# Patient Record
Sex: FEMALE | Race: BLACK OR AFRICAN AMERICAN | Employment: FULL TIME | URBAN - METROPOLITAN AREA
[De-identification: names, ages, dates, MRNs, and addresses within clinical notes are randomized per-mention and may not be internally consistent; named-entity substitution may affect disease eponyms.]

---
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
## 2021-02-10 ENCOUNTER — APPOINTMENT (OUTPATIENT)
Dept: CT IMAGING | Age: 21
DRG: 059 | End: 2021-02-10
Attending: EMERGENCY MEDICINE
Payer: COMMERCIAL

## 2021-02-10 ENCOUNTER — HOSPITAL ENCOUNTER (EMERGENCY)
Age: 21
Discharge: HOME | DRG: 059 | End: 2021-02-10
Attending: EMERGENCY MEDICINE
Payer: COMMERCIAL

## 2021-02-10 VITALS
RESPIRATION RATE: 18 BRPM | WEIGHT: 286.6 LBS | DIASTOLIC BLOOD PRESSURE: 98 MMHG | HEIGHT: 68 IN | OXYGEN SATURATION: 98 % | SYSTOLIC BLOOD PRESSURE: 159 MMHG | HEART RATE: 67 BPM | BODY MASS INDEX: 43.44 KG/M2 | TEMPERATURE: 98.4 F

## 2021-02-10 DIAGNOSIS — I10 HYPERTENSION, UNSPECIFIED TYPE: ICD-10-CM

## 2021-02-10 DIAGNOSIS — H54.61 VISION LOSS OF RIGHT EYE: Primary | ICD-10-CM

## 2021-02-10 LAB
ANION GAP BLD CALC-SCNC: 15 MMOL/L (ref 10–20)
BUN BLD-MCNC: 8 MG/DL (ref 9–20)
CA-I BLD-MCNC: 1.2 MMOL/L (ref 1.12–1.32)
CHLORIDE BLD-SCNC: 104 MMOL/L (ref 98–107)
CO2 BLD-SCNC: 25 MMOL/L (ref 21–32)
COMMENT, HOLDF: NORMAL
CREAT BLD-MCNC: 0.7 MG/DL (ref 0.6–1.3)
GLUCOSE BLD-MCNC: 86 MG/DL (ref 65–100)
HCG UR QL: NEGATIVE
HCT VFR BLD CALC: 40 % (ref 35–47)
POTASSIUM BLD-SCNC: 3.7 MMOL/L (ref 3.5–5.1)
SAMPLES BEING HELD,HOLD: NORMAL
SERVICE CMNT-IMP: ABNORMAL
SODIUM BLD-SCNC: 139 MMOL/L (ref 136–145)

## 2021-02-10 PROCEDURE — 96374 THER/PROPH/DIAG INJ IV PUSH: CPT

## 2021-02-10 PROCEDURE — 81025 URINE PREGNANCY TEST: CPT

## 2021-02-10 PROCEDURE — 80047 BASIC METABLC PNL IONIZED CA: CPT

## 2021-02-10 PROCEDURE — 99285 EMERGENCY DEPT VISIT HI MDM: CPT

## 2021-02-10 PROCEDURE — 74011000250 HC RX REV CODE- 250: Performed by: EMERGENCY MEDICINE

## 2021-02-10 PROCEDURE — 74011000636 HC RX REV CODE- 636: Performed by: RADIOLOGY

## 2021-02-10 PROCEDURE — 74011250636 HC RX REV CODE- 250/636: Performed by: EMERGENCY MEDICINE

## 2021-02-10 PROCEDURE — 70450 CT HEAD/BRAIN W/O DYE: CPT

## 2021-02-10 PROCEDURE — 70496 CT ANGIOGRAPHY HEAD: CPT

## 2021-02-10 RX ORDER — LABETALOL HCL 20 MG/4 ML
20 SYRINGE (ML) INTRAVENOUS
Status: COMPLETED | OUTPATIENT
Start: 2021-02-10 | End: 2021-02-10

## 2021-02-10 RX ORDER — TETRACAINE HYDROCHLORIDE 5 MG/ML
2 SOLUTION OPHTHALMIC
Status: COMPLETED | OUTPATIENT
Start: 2021-02-10 | End: 2021-02-10

## 2021-02-10 RX ORDER — AMLODIPINE BESYLATE 5 MG/1
5 TABLET ORAL DAILY
Qty: 30 TAB | Refills: 2 | Status: ON HOLD | OUTPATIENT
Start: 2021-02-10 | End: 2021-02-12

## 2021-02-10 RX ADMIN — TETRACAINE HYDROCHLORIDE 2 DROP: 5 SOLUTION OPHTHALMIC at 20:07

## 2021-02-10 RX ADMIN — LABETALOL HYDROCHLORIDE 20 MG: 5 INJECTION, SOLUTION INTRAVENOUS at 20:06

## 2021-02-10 RX ADMIN — IOPAMIDOL 160 ML: 755 INJECTION, SOLUTION INTRAVENOUS at 18:21

## 2021-02-10 NOTE — ED PROVIDER NOTES
Patient is a 25-year-old with a history of prediabetes and untreated hypertension who presents to the emergency [facility] with loss of vision in her right eye. Patient reports that approximately 5 days ago she developed a black spot in her central vision that has expanded. At this point she can see movement and light in her right eye but no colors or shapes; vision in her left eye is unchanged from its baseline. Patient has not had any headache, nausea, vomiting, dizziness, numbness, tingling, weakness, or eye pain or pressure. The history is provided by the patient. Eye Problem   This is a new problem. Episode onset: 6 days ago. The problem occurs constantly. The problem has not changed since onset. The right eye is affected. The injury mechanism was none. The patient is experiencing no pain. There is no history of trauma to the eye. There is no known exposure to pink eye. Associated symptoms include blindness (started as a central black spot that then expanded - now she can only see light and movement). Pertinent negatives include no numbness, no blurred vision, no photophobia, no nausea, no vomiting, no weakness, no fever, no pain, no head injury and no dizziness. She has tried nothing for the symptoms. No past medical history on file. No past surgical history on file.       Family History:   Problem Relation Age of Onset    Hypertension Mother     Hypertension Father     Elevated Lipids Father     Cancer Paternal Grandmother     Diabetes Maternal Grandmother        Social History     Socioeconomic History    Marital status: SINGLE     Spouse name: Not on file    Number of children: Not on file    Years of education: Not on file    Highest education level: Not on file   Occupational History    Not on file   Social Needs    Financial resource strain: Not on file    Food insecurity     Worry: Not on file     Inability: Not on file    Transportation needs     Medical: Not on file Non-medical: Not on file   Tobacco Use    Smoking status: Passive Smoke Exposure - Never Smoker    Smokeless tobacco: Never Used   Substance and Sexual Activity    Alcohol use: No    Drug use: No    Sexual activity: Never   Lifestyle    Physical activity     Days per week: Not on file     Minutes per session: Not on file    Stress: Not on file   Relationships    Social connections     Talks on phone: Not on file     Gets together: Not on file     Attends Mosque service: Not on file     Active member of club or organization: Not on file     Attends meetings of clubs or organizations: Not on file     Relationship status: Not on file    Intimate partner violence     Fear of current or ex partner: Not on file     Emotionally abused: Not on file     Physically abused: Not on file     Forced sexual activity: Not on file   Other Topics Concern    Not on file   Social History Narrative    Not on file         ALLERGIES: Patient has no known allergies. Review of Systems   Constitutional: Negative for chills and fever. Eyes: Positive for blindness (started as a central black spot that then expanded - now she can only see light and movement) and visual disturbance. Negative for blurred vision, photophobia and pain. Respiratory: Negative for shortness of breath. Cardiovascular: Negative for chest pain. Gastrointestinal: Negative for abdominal pain, constipation, diarrhea, nausea and vomiting. Neurological: Negative for dizziness, weakness, light-headedness and numbness. All other systems reviewed and are negative. Vitals:    02/10/21 1606   BP: (!) 182/100   Pulse: 80   Resp: 18   Temp: 97.1 °F (36.2 °C)   SpO2: 100%   Weight: 130 kg (286 lb 9.6 oz)   Height: 5' 8\" (1.727 m)            Physical Exam  Vitals signs and nursing note reviewed. Constitutional:       Appearance: Normal appearance. She is well-developed. She is obese. She is not ill-appearing.    HENT:      Head: Normocephalic and atraumatic.   Eyes:      General: No scleral icterus.     Intraocular pressure: Right eye pressure is 16 mmHg. Left eye pressure is 16 mmHg. Measurements were taken using an automated tonometer.  Neck:      Musculoskeletal: Normal range of motion.   Cardiovascular:      Rate and Rhythm: Normal rate and regular rhythm.   Pulmonary:      Effort: Pulmonary effort is normal.      Breath sounds: Normal breath sounds.   Abdominal:      General: There is no distension.      Tenderness: There is no abdominal tenderness.   Skin:     General: Skin is warm and dry.      Findings: No erythema or rash.   Neurological:      Mental Status: She is alert and oriented to person, place, and time.      Cranial Nerves: No cranial nerve deficit.      Sensory: No sensory deficit.      Motor: No weakness.      Gait: Gait normal.   Psychiatric:         Mood and Affect: Mood normal.         Behavior: Behavior normal.          MDM  Number of Diagnoses or Management Options  Hypertension, unspecified type  Vision loss of right eye  Diagnosis management comments: Patient presents with vision loss for 5 days and hypertension. The patient is now resting comfortably and feels better, is alert, talkative, interactive and in no distress.  The history, exam, diagnostic testing (if any) and the patient's current condition do not suggest meningitis, stroke, sepsis, subarachnoid hemorrhage, intracranial bleeding, encephalitis, temporal arteritis, glaucoma, or large mass. Education was provided to the patient today regarding their hypertension. Patient has not signs/symptoms of ACS, CHF, CVA, or other hypertensive emergency. Patient is made aware of their elevated blood pressure and is instructed to follow up this week with their Primary Care for a recheck; meanwhile I will prescribe amlodipine. Patient is counseled regarding consequences of chronic, uncontrolled hypertension including kidney disease, heart disease, stroke or even death. Patient states  their understanding and agrees to follow up this week. The patient's condition is stable and appropriate for discharge. The patient will pursue further outpatient evaluation with the primary care physician or other designated or consulting physician as indicated in the discharge instructions. Procedures    7:54 PM   Case was discussed with Dr. Richard Del Rosario, ophthalmology, who recommends checking ocular pressures. If pressures are wnl, she will see the patient in the morning at 8 AM for a dilated eye exam.          The patient's results have been reviewed with them and/or available family. Patient and/or family verbally conveyed their understanding and agreement of the patient's signs, symptoms, diagnosis, treatment and prognosis and additionally agree to follow up as recommended in the discharge instructions or to return to the Emergency Room should their condition change prior to their follow-up appointment. The patient/family verbally agrees with the care-plan and verbally conveys that all of their questions have been answered. The discharge instructions have also been provided to the patient and/or family with some educational information regarding the patient's diagnosis as well a list of reasons why the patient would want to return to the ER prior to their follow-up appointment, should their condition change.

## 2021-02-10 NOTE — Clinical Note
[address] 
[facility] EMERGENCY DEPT 
[address] [place] [id number] [phone] Work/School Note Date: 2/10/2021 To Whom It May concern: 
 
Liset Patrick was seen and treated today in the emergency room by the following provider(s): 
Attending Provider: Ricci Sweet MD. Liset Patrick is excused from work/school on 02/10/21 and 02/11/21. She is medically clear to return to work/school on 2/12/2021. Sincerely, Wendy Daniel MD

## 2021-02-10 NOTE — ED TRIAGE NOTES
Pt reports 5 days ago she started having changes in her vision in her right eye. Pt states at first it was only part of her visual field but now her entire visual field in her right eye appears black. Says she also has blurred vision in her left eye. Has appt with ophthalmologist in 2 days. Wears glasses. Denies any other sx.

## 2021-02-10 NOTE — ED NOTES

## 2021-02-11 ENCOUNTER — HOSPITAL ENCOUNTER (INPATIENT)
Age: 21
LOS: 3 days | Discharge: HOME | DRG: 059 | End: 2021-02-15
Attending: EMERGENCY MEDICINE | Admitting: FAMILY MEDICINE
Payer: COMMERCIAL

## 2021-02-11 ENCOUNTER — APPOINTMENT (OUTPATIENT)
Dept: MRI IMAGING | Age: 21
DRG: 059 | End: 2021-02-11
Attending: EMERGENCY MEDICINE
Payer: COMMERCIAL

## 2021-02-11 DIAGNOSIS — R73.03 PREDIABETES: ICD-10-CM

## 2021-02-11 DIAGNOSIS — G35 MULTIPLE SCLEROSIS (HCC): Primary | ICD-10-CM

## 2021-02-11 DIAGNOSIS — E66.01 SEVERE OBESITY (HCC): ICD-10-CM

## 2021-02-11 DIAGNOSIS — I10 ESSENTIAL HYPERTENSION: ICD-10-CM

## 2021-02-11 DIAGNOSIS — G37.9 DEMYELINATING DISEASE (HCC): ICD-10-CM

## 2021-02-11 DIAGNOSIS — H54.7 VISION LOSS: ICD-10-CM

## 2021-02-11 LAB
ALBUMIN SERPL-MCNC: 3.4 G/DL (ref 3.5–5)
ALBUMIN/GLOB SERPL: 0.8 {RATIO} (ref 1.1–2.2)
ALP SERPL-CCNC: 58 U/L (ref 45–117)
ALT SERPL-CCNC: 25 U/L (ref 12–78)
ANION GAP SERPL CALC-SCNC: 8 MMOL/L (ref 5–15)
AST SERPL-CCNC: 16 U/L (ref 15–37)
BASOPHILS # BLD: 0.1 K/UL (ref 0–0.1)
BASOPHILS NFR BLD: 1 % (ref 0–1)
BILIRUB SERPL-MCNC: 1.2 MG/DL (ref 0.2–1)
BUN SERPL-MCNC: 9 MG/DL (ref 6–20)
BUN/CREAT SERPL: 10 (ref 12–20)
CALCIUM SERPL-MCNC: 8.4 MG/DL (ref 8.5–10.1)
CHLORIDE SERPL-SCNC: 105 MMOL/L (ref 97–108)
CO2 SERPL-SCNC: 25 MMOL/L (ref 21–32)
COMMENT, HOLDF: NORMAL
CREAT SERPL-MCNC: 0.9 MG/DL (ref 0.55–1.02)
DIFFERENTIAL METHOD BLD: NORMAL
EOSINOPHIL # BLD: 0.2 K/UL (ref 0–0.4)
EOSINOPHIL NFR BLD: 3 % (ref 0–7)
ERYTHROCYTE [DISTWIDTH] IN BLOOD BY AUTOMATED COUNT: 13.2 % (ref 11.5–14.5)
GLOBULIN SER CALC-MCNC: 4.1 G/DL (ref 2–4)
GLUCOSE SERPL-MCNC: 90 MG/DL (ref 65–100)
HCT VFR BLD AUTO: 36.5 % (ref 35–47)
HGB BLD-MCNC: 12.1 G/DL (ref 11.5–16)
IMM GRANULOCYTES # BLD AUTO: 0 K/UL (ref 0–0.04)
IMM GRANULOCYTES NFR BLD AUTO: 0 % (ref 0–0.5)
LYMPHOCYTES # BLD: 1.9 K/UL (ref 0.8–3.5)
LYMPHOCYTES NFR BLD: 28 % (ref 12–49)
MCH RBC QN AUTO: 27.9 PG (ref 26–34)
MCHC RBC AUTO-ENTMCNC: 33.2 G/DL (ref 30–36.5)
MCV RBC AUTO: 84.1 FL (ref 80–99)
MONOCYTES # BLD: 0.6 K/UL (ref 0–1)
MONOCYTES NFR BLD: 9 % (ref 5–13)
NEUTS SEG # BLD: 4 K/UL (ref 1.8–8)
NEUTS SEG NFR BLD: 59 % (ref 32–75)
NRBC # BLD: 0 K/UL (ref 0–0.01)
NRBC BLD-RTO: 0 PER 100 WBC
PLATELET # BLD AUTO: 313 K/UL (ref 150–400)
PMV BLD AUTO: 9.9 FL (ref 8.9–12.9)
POTASSIUM SERPL-SCNC: 3.4 MMOL/L (ref 3.5–5.1)
PROT SERPL-MCNC: 7.5 G/DL (ref 6.4–8.2)
RBC # BLD AUTO: 4.34 M/UL (ref 3.8–5.2)
SAMPLES BEING HELD,HOLD: NORMAL
SODIUM SERPL-SCNC: 138 MMOL/L (ref 136–145)
WBC # BLD AUTO: 6.7 K/UL (ref 3.6–11)

## 2021-02-11 PROCEDURE — A9575 INJ GADOTERATE MEGLUMI 0.1ML: HCPCS | Performed by: RADIOLOGY

## 2021-02-11 PROCEDURE — 70553 MRI BRAIN STEM W/O & W/DYE: CPT

## 2021-02-11 PROCEDURE — 74011250636 HC RX REV CODE- 250/636: Performed by: RADIOLOGY

## 2021-02-11 PROCEDURE — 74011000258 HC RX REV CODE- 258: Performed by: EMERGENCY MEDICINE

## 2021-02-11 PROCEDURE — 99284 EMERGENCY DEPT VISIT MOD MDM: CPT

## 2021-02-11 PROCEDURE — 85025 COMPLETE CBC W/AUTO DIFF WBC: CPT

## 2021-02-11 PROCEDURE — 80053 COMPREHEN METABOLIC PANEL: CPT

## 2021-02-11 PROCEDURE — 96374 THER/PROPH/DIAG INJ IV PUSH: CPT

## 2021-02-11 PROCEDURE — 74011250636 HC RX REV CODE- 250/636: Performed by: EMERGENCY MEDICINE

## 2021-02-11 PROCEDURE — 36415 COLL VENOUS BLD VENIPUNCTURE: CPT

## 2021-02-11 RX ORDER — GADOTERATE MEGLUMINE 376.9 MG/ML
20 INJECTION INTRAVENOUS
Status: COMPLETED | OUTPATIENT
Start: 2021-02-11 | End: 2021-02-11

## 2021-02-11 RX ADMIN — GADOTERATE MEGLUMINE 20 ML: 376.9 INJECTION INTRAVENOUS at 19:58

## 2021-02-11 RX ADMIN — SODIUM CHLORIDE 1000 MG: 900 INJECTION INTRAVENOUS at 23:14

## 2021-02-11 NOTE — ED TRIAGE NOTES
Pt here for loss of vision in right eye since Friday. Seen here last night and referred to eye dr. Dena Medina there today and sent back today for MRI. Pt states started with small black area and now can't see other than differentiating between light and dark.

## 2021-02-11 NOTE — ED NOTES
Dr. Po Alvarado has reviewed discharge instructions with the patient. The patient verbalized understanding.

## 2021-02-11 NOTE — ED NOTES
Bedside and Verbal shift change report given to Ashlee Kauffman RN (oncoming nurse) by ROLY Marques (offgoing nurse). Report included the following information SBAR, Kardex and ED Summary.

## 2021-02-12 ENCOUNTER — APPOINTMENT (OUTPATIENT)
Dept: MRI IMAGING | Age: 21
DRG: 059 | End: 2021-02-12
Attending: STUDENT IN AN ORGANIZED HEALTH CARE EDUCATION/TRAINING PROGRAM
Payer: COMMERCIAL

## 2021-02-12 PROBLEM — I10 HTN (HYPERTENSION): Status: ACTIVE | Noted: 2021-02-12

## 2021-02-12 PROBLEM — H54.7 VISION LOSS: Status: ACTIVE | Noted: 2021-02-12

## 2021-02-12 PROBLEM — G37.9 DEMYELINATING DISEASE (HCC): Status: ACTIVE | Noted: 2021-02-12

## 2021-02-12 LAB
ANION GAP SERPL CALC-SCNC: 5 MMOL/L (ref 5–15)
BASOPHILS # BLD: 0 K/UL (ref 0–0.1)
BASOPHILS NFR BLD: 0 % (ref 0–1)
BUN SERPL-MCNC: 10 MG/DL (ref 6–20)
BUN/CREAT SERPL: 11 (ref 12–20)
CALCIUM SERPL-MCNC: 8.6 MG/DL (ref 8.5–10.1)
CHLORIDE SERPL-SCNC: 104 MMOL/L (ref 97–108)
CO2 SERPL-SCNC: 26 MMOL/L (ref 21–32)
CREAT SERPL-MCNC: 0.87 MG/DL (ref 0.55–1.02)
DIFFERENTIAL METHOD BLD: ABNORMAL
EOSINOPHIL # BLD: 0 K/UL (ref 0–0.4)
EOSINOPHIL NFR BLD: 1 % (ref 0–7)
ERYTHROCYTE [DISTWIDTH] IN BLOOD BY AUTOMATED COUNT: 13 % (ref 11.5–14.5)
EST. AVERAGE GLUCOSE BLD GHB EST-MCNC: 105 MG/DL
GLUCOSE SERPL-MCNC: 112 MG/DL (ref 65–100)
HBA1C MFR BLD: 5.3 % (ref 4–5.6)
HCT VFR BLD AUTO: 38 % (ref 35–47)
HGB BLD-MCNC: 12.6 G/DL (ref 11.5–16)
IMM GRANULOCYTES # BLD AUTO: 0 K/UL (ref 0–0.04)
IMM GRANULOCYTES NFR BLD AUTO: 0 % (ref 0–0.5)
LYMPHOCYTES # BLD: 0.8 K/UL (ref 0.8–3.5)
LYMPHOCYTES NFR BLD: 12 % (ref 12–49)
MCH RBC QN AUTO: 27.8 PG (ref 26–34)
MCHC RBC AUTO-ENTMCNC: 33.2 G/DL (ref 30–36.5)
MCV RBC AUTO: 83.9 FL (ref 80–99)
MONOCYTES # BLD: 0.2 K/UL (ref 0–1)
MONOCYTES NFR BLD: 2 % (ref 5–13)
NEUTS SEG # BLD: 6 K/UL (ref 1.8–8)
NEUTS SEG NFR BLD: 85 % (ref 32–75)
NRBC # BLD: 0 K/UL (ref 0–0.01)
NRBC BLD-RTO: 0 PER 100 WBC
PLATELET # BLD AUTO: 306 K/UL (ref 150–400)
PMV BLD AUTO: 9.8 FL (ref 8.9–12.9)
POTASSIUM SERPL-SCNC: 3.8 MMOL/L (ref 3.5–5.1)
RBC # BLD AUTO: 4.53 M/UL (ref 3.8–5.2)
SODIUM SERPL-SCNC: 135 MMOL/L (ref 136–145)
WBC # BLD AUTO: 7 K/UL (ref 3.6–11)

## 2021-02-12 PROCEDURE — 74011250636 HC RX REV CODE- 250/636: Performed by: STUDENT IN AN ORGANIZED HEALTH CARE EDUCATION/TRAINING PROGRAM

## 2021-02-12 PROCEDURE — 99223 1ST HOSP IP/OBS HIGH 75: CPT | Performed by: FAMILY MEDICINE

## 2021-02-12 PROCEDURE — A9575 INJ GADOTERATE MEGLUMI 0.1ML: HCPCS | Performed by: FAMILY MEDICINE

## 2021-02-12 PROCEDURE — 94760 N-INVAS EAR/PLS OXIMETRY 1: CPT

## 2021-02-12 PROCEDURE — 97161 PT EVAL LOW COMPLEX 20 MIN: CPT

## 2021-02-12 PROCEDURE — 99255 IP/OBS CONSLTJ NEW/EST HI 80: CPT | Performed by: PSYCHIATRY & NEUROLOGY

## 2021-02-12 PROCEDURE — 74011250636 HC RX REV CODE- 250/636: Performed by: FAMILY MEDICINE

## 2021-02-12 PROCEDURE — 83036 HEMOGLOBIN GLYCOSYLATED A1C: CPT

## 2021-02-12 PROCEDURE — 80048 BASIC METABOLIC PNL TOTAL CA: CPT

## 2021-02-12 PROCEDURE — 72157 MRI CHEST SPINE W/O & W/DYE: CPT

## 2021-02-12 PROCEDURE — 72156 MRI NECK SPINE W/O & W/DYE: CPT

## 2021-02-12 PROCEDURE — 77030036660

## 2021-02-12 PROCEDURE — 36415 COLL VENOUS BLD VENIPUNCTURE: CPT

## 2021-02-12 PROCEDURE — 74011250637 HC RX REV CODE- 250/637: Performed by: STUDENT IN AN ORGANIZED HEALTH CARE EDUCATION/TRAINING PROGRAM

## 2021-02-12 PROCEDURE — 85025 COMPLETE CBC W/AUTO DIFF WBC: CPT

## 2021-02-12 PROCEDURE — 74011000258 HC RX REV CODE- 258: Performed by: PSYCHIATRY & NEUROLOGY

## 2021-02-12 PROCEDURE — 74011250636 HC RX REV CODE- 250/636: Performed by: PSYCHIATRY & NEUROLOGY

## 2021-02-12 PROCEDURE — 97165 OT EVAL LOW COMPLEX 30 MIN: CPT

## 2021-02-12 PROCEDURE — 65270000029 HC RM PRIVATE

## 2021-02-12 RX ORDER — LABETALOL HCL 20 MG/4 ML
20 SYRINGE (ML) INTRAVENOUS
Status: DISCONTINUED | OUTPATIENT
Start: 2021-02-12 | End: 2021-02-15 | Stop reason: HOSPADM

## 2021-02-12 RX ORDER — ENOXAPARIN SODIUM 100 MG/ML
40 INJECTION SUBCUTANEOUS DAILY
Status: DISCONTINUED | OUTPATIENT
Start: 2021-02-12 | End: 2021-02-12

## 2021-02-12 RX ORDER — AMLODIPINE BESYLATE 5 MG/1
5 TABLET ORAL DAILY
COMMUNITY
End: 2021-02-15

## 2021-02-12 RX ORDER — ACETAMINOPHEN 325 MG/1
650 TABLET ORAL
Status: DISCONTINUED | OUTPATIENT
Start: 2021-02-12 | End: 2021-02-15 | Stop reason: HOSPADM

## 2021-02-12 RX ORDER — AMLODIPINE BESYLATE 5 MG/1
5 TABLET ORAL
Status: DISCONTINUED | OUTPATIENT
Start: 2021-02-12 | End: 2021-02-12

## 2021-02-12 RX ORDER — ACETAMINOPHEN 650 MG/1
650 SUPPOSITORY RECTAL
Status: DISCONTINUED | OUTPATIENT
Start: 2021-02-12 | End: 2021-02-15 | Stop reason: HOSPADM

## 2021-02-12 RX ORDER — POLYETHYLENE GLYCOL 3350 17 G/17G
17 POWDER, FOR SOLUTION ORAL DAILY PRN
Status: DISCONTINUED | OUTPATIENT
Start: 2021-02-12 | End: 2021-02-15 | Stop reason: HOSPADM

## 2021-02-12 RX ORDER — ONDANSETRON 2 MG/ML
4 INJECTION INTRAMUSCULAR; INTRAVENOUS
Status: DISCONTINUED | OUTPATIENT
Start: 2021-02-12 | End: 2021-02-15 | Stop reason: HOSPADM

## 2021-02-12 RX ORDER — GADOTERATE MEGLUMINE 376.9 MG/ML
20 INJECTION INTRAVENOUS
Status: COMPLETED | OUTPATIENT
Start: 2021-02-12 | End: 2021-02-12

## 2021-02-12 RX ORDER — AMLODIPINE BESYLATE 5 MG/1
5 TABLET ORAL
Status: COMPLETED | OUTPATIENT
Start: 2021-02-12 | End: 2021-02-12

## 2021-02-12 RX ORDER — SODIUM CHLORIDE 0.9 % (FLUSH) 0.9 %
5-40 SYRINGE (ML) INJECTION EVERY 8 HOURS
Status: DISCONTINUED | OUTPATIENT
Start: 2021-02-12 | End: 2021-02-15 | Stop reason: HOSPADM

## 2021-02-12 RX ORDER — PROMETHAZINE HYDROCHLORIDE 25 MG/1
12.5 TABLET ORAL
Status: DISCONTINUED | OUTPATIENT
Start: 2021-02-12 | End: 2021-02-15 | Stop reason: HOSPADM

## 2021-02-12 RX ORDER — SODIUM CHLORIDE 0.9 % (FLUSH) 0.9 %
5-40 SYRINGE (ML) INJECTION AS NEEDED
Status: DISCONTINUED | OUTPATIENT
Start: 2021-02-12 | End: 2021-02-15 | Stop reason: HOSPADM

## 2021-02-12 RX ORDER — HYDRALAZINE HYDROCHLORIDE 20 MG/ML
10 INJECTION INTRAMUSCULAR; INTRAVENOUS
Status: DISCONTINUED | OUTPATIENT
Start: 2021-02-12 | End: 2021-02-12

## 2021-02-12 RX ORDER — AMLODIPINE BESYLATE 5 MG/1
10 TABLET ORAL DAILY
Status: DISCONTINUED | OUTPATIENT
Start: 2021-02-13 | End: 2021-02-15 | Stop reason: HOSPADM

## 2021-02-12 RX ORDER — ENOXAPARIN SODIUM 100 MG/ML
40 INJECTION SUBCUTANEOUS EVERY 12 HOURS
Status: DISCONTINUED | OUTPATIENT
Start: 2021-02-12 | End: 2021-02-15 | Stop reason: HOSPADM

## 2021-02-12 RX ADMIN — AMLODIPINE BESYLATE 5 MG: 5 TABLET ORAL at 01:32

## 2021-02-12 RX ADMIN — LABETALOL HYDROCHLORIDE 20 MG: 5 INJECTION, SOLUTION INTRAVENOUS at 15:19

## 2021-02-12 RX ADMIN — Medication 10 ML: at 15:22

## 2021-02-12 RX ADMIN — Medication 10 ML: at 20:07

## 2021-02-12 RX ADMIN — ACETAMINOPHEN 650 MG: 325 TABLET ORAL at 20:10

## 2021-02-12 RX ADMIN — SODIUM CHLORIDE 1000 MG: 900 INJECTION INTRAVENOUS at 18:00

## 2021-02-12 RX ADMIN — HYDRALAZINE HYDROCHLORIDE 10 MG: 20 INJECTION, SOLUTION INTRAMUSCULAR; INTRAVENOUS at 04:32

## 2021-02-12 RX ADMIN — GADOTERATE MEGLUMINE 20 ML: 376.9 INJECTION INTRAVENOUS at 13:39

## 2021-02-12 RX ADMIN — ACETAMINOPHEN 650 MG: 325 TABLET ORAL at 15:00

## 2021-02-12 RX ADMIN — ENOXAPARIN SODIUM 40 MG: 100 INJECTION SUBCUTANEOUS at 10:38

## 2021-02-12 RX ADMIN — Medication 10 ML: at 05:13

## 2021-02-12 RX ADMIN — ENOXAPARIN SODIUM 40 MG: 100 INJECTION SUBCUTANEOUS at 20:06

## 2021-02-12 RX ADMIN — AMLODIPINE BESYLATE 5 MG: 5 TABLET ORAL at 10:38

## 2021-02-12 NOTE — ED NOTES
TRANSFER - OUT REPORT:    Verbal report given to [facility] RN(name) on Claude Call  being transferred to 4th floor(unit) for routine progression of care       Report consisted of patients Situation, Background, Assessment and   Recommendations(SBAR). Information from the following report(s) SBAR, ED Summary, [facility] and Recent Results was reviewed with the receiving nurse. Lines:   Peripheral IV 02/11/21 Right Hand (Active)   Phlebitis Assessment 0 02/11/21 1841   Infiltration Assessment 0 02/11/21 1841   Dressing Status Clean, dry, & intact 02/11/21 1841        Opportunity for questions and clarification was provided.       Patient transported with:   Monitor  Registered Nurse

## 2021-02-12 NOTE — PROGRESS NOTES
OCCUPATIONAL THERAPY EVALUATION/DISCHARGE  Patient: Erika Pina (87 y.o. female)  Date: 2/12/2021  Primary Diagnosis: Vision loss [H54.7]       Precautions: Vision (blind in R eye - new)      ASSESSMENT  Based on the objective data described below, the patient presents with impaired vision in R eye following admission for vision loss. Pt is received in bed, pleasant and agreeable to participate, able to follow all commands. Pt is able to perform serial ADL tasks and activity with independence and demonstrates equal and intact ROM, coordination, and strength in UEs. Pt has absent vision in R eye. She reports seeing \"white lines\" and light, but no shadows or shapes. Vision intact on L. No observed difficulty with obstacle mgmt during OOB activity, but pt is receptive to education regarding visual scanning and lighthouse technique during mobility for fall prevention. At this time, no further skilled acute OT needs indicated. Neurology MD present at end of session. Current Level of Function (ADLs/self-care): mod I to independent for ADLs    Functional Outcome Measure: The patient scored 100/100 on the Barthel functional Outcome Measure, which indicates no functional impairment. Other factors to consider for discharge: new diagnosis possible MS; age; active and high PLOF     PLAN :  Recommendation for discharge: (in order for the patient to meet his/her long term goals)  No skilled occupational therapy/ follow up rehabilitation needs identified at this time. This discharge recommendation:  Has been made in collaboration with the attending provider and/or case management    IF patient discharges home will need the following DME:none       SUBJECTIVE:   Patient stated I can't see on the right side.     OBJECTIVE DATA SUMMARY:   HISTORY:   History reviewed. No pertinent past medical history. No past surgical history on file.     Prior Level of Function/Environment/Context: works for [employer], active and independent; lives alone in an apartment  Expanded or extensive additional review of patient history:     Home Situation  Home Environment: Apartment  # Steps to Enter: 36  Rails to Enter: Yes  Hand Rails : Right  Living Alone: Yes  Support Systems: Family member(s)  Tub or Shower Type: Tub/Shower combination    Hand dominance: Right    EXAMINATION OF PERFORMANCE DEFICITS:  Cognitive/Behavioral Status:  Neurologic State: Alert  Orientation Level: Oriented X4  Cognition: Appropriate decision making;Appropriate for age attention/concentration;Appropriate safety awareness;Follows commands(Simultaneous filing. User may not have seen previous data.)  Perception: Appears intact  Perseveration: No perseveration noted  Safety/Judgement: Awareness of environment;Fall prevention;Good awareness of safety precautions;Home safety;Insight into deficits    Vision/Perceptual:    Tracking: Able to track stimulus in all quadrants w/o difficulty    Convergence: Breaks at 9 from nose    Visual Fields: Difficulty detecting stimulus  in right lateral quadrant;Difficulty detecting stimulus in right lower quadrant;Difficulty detecting stimulus in right upper quadrant  Diplopia: No    When L eye is occluded, pt unable to locate a target or track a stimulus with R eye.     Range of Motion:  AROM: Within functional limits  PROM: Within functional limits    Strength:  Strength: Within functional limits    Coordination:  Coordination: Within functional limits  Fine Motor Skills-Upper: Left Intact;Right Intact    Gross Motor Skills-Upper: Left Intact;Right Intact    Tone & Sensation:  Tone: Normal  Sensation: Intact    Balance:  Sitting: Intact  Standing: Intact    Functional Mobility and Transfers for ADLs:  Bed Mobility:  Supine to Sit: Independent  Sit to Supine: Independent    Transfers:  Sit to Stand: Independent  Stand to Sit: Independent  Bathroom Mobility: Independent  Toilet Transfer : Independent    ADL Assessment:  Feeding:  Independent    Oral Facial Hygiene/Grooming: Independent    Bathing: Supervision    Upper Body Dressing: Independent    Lower Body Dressing: Independent    Toileting: Independent    ADL Intervention and task modifications:    Lower Body Dressing Assistance  Socks: Independent  Slip on Shoes with Back: Independent  Leg Crossed Method Used: Yes  Position Performed: Seated edge of bed  Cues: Don    Cognitive Retraining  Safety/Judgement: Awareness of environment; Fall prevention;Good awareness of safety precautions; Home safety; Insight into deficits    Pt educated on visual scanning and use of lighthouse technique during OOB mobility for fall prevention due to impaired vision on R side. Pt indicated understanding of same. Functional Measure:  Barthel Index:    Bathin  Bladder: 10  Bowels: 10  Groomin  Dressing: 10  Feeding: 10  Mobility: 15  Stairs: 10  Toilet Use: 10  Transfer (Bed to Chair and Back): 15  Total: 100/100        The Barthel ADL Index: Guidelines  1. The index should be used as a record of what a patient does, not as a record of what a patient could do. 2. The main aim is to establish degree of independence from any help, physical or verbal, however minor and for whatever reason. 3. The need for supervision renders the patient not independent. 4. A patient's performance should be established using the best available evidence. Asking the patient, friends/relatives and nurses are the usual sources, but direct observation and common sense are also important. However direct testing is not needed. 5. Usually the patient's performance over the preceding 24-48 hours is important, but occasionally longer periods will be relevant. 6. Middle categories imply that the patient supplies over 50 per cent of the effort. 7. Use of aids to be independent is allowed. Cam Warren., Barthel, D.W. ([id number]). Functional evaluation: the Barthel Index. [address] [facility] (14)2.   HARRY Augustine, Elizabeth, DANNY Nobles (1999). Measuring the change indisability after inpatient rehabilitation; comparison of the responsiveness of the Barthel Index and Functional [facility] Measure. Journal of Neurology, Neurosurgery, and Psychiatry, 66(4), 369-823. JIMENA Elliott, GLORIA Post, & Leopoldo Soliman M.A. (2004.) Assessment of post-stroke quality of life in cost-effectiveness studies: The usefulness of the Barthel Index and the EuroQoL-5D. Quality of Life Research, 15, 149-15     Occupational Therapy Evaluation Charge Determination   History Examination Decision-Making   LOW Complexity : Brief history review  LOW Complexity : 1-3 performance deficits relating to physical, cognitive , or psychosocial skils that result in activity limitations and / or participation restrictions  LOW Complexity : No comorbidities that affect functional and no verbal or physical assistance needed to complete eval tasks       Based on the above components, the patient evaluation is determined to be of the following complexity level: LOW   Pain Rating:  Pt reporting 4/10 frontal HA during session    Activity Tolerance:   Good    After treatment patient left in no apparent distress:    Sitting up at EOB with Neurologist present; Mother present at bedside    COMMUNICATION/EDUCATION:   The patients plan of care was discussed with: Physical therapist and Registered nurse.      Thank you for this referral.  Jama Lugo OT  Time Calculation: 17 mins

## 2021-02-12 NOTE — H&P
[facility] [address] [place] [address]   Office [phone]  Fax [phone]       Admission H&P     Name: Catie Hamm MRN: [id number]  Sex: Female   YOB: 2000  Age: 24 y.o. PCP: Luis Galan MD     Source of Information: patient, medical records    Chief complaint: R eye vision loss    History of Present Illness  Catie Hamm is a 24 y.o. female with PMHx of HTN, prediabetes who presents to the ED complaining of vision loss in R eye. Symptoms first started 1 week ago, initially had a dark area in the middle of her eye that affected her vision. This area of vision loss expanded progressively over the course of the last week, causing her to present to the ED 2 days ago. At that time pt was set up to see an ophthalmologist OP, and at her visit today was instructed to return to the hospital to obtain an MRI of her brain. Pt states that she is now unable to clearly see out of the entire R eye and can just see raoul and light shapes. Pt endorses experiencing moments of LE extremity weakness over the course of the last year, she has not had any falls or difficulty walking but states that she feels like her legs will give out. Additionally pt endorses periodic loss of control of her bladder for at least the last year. Pt states that she is sometimes unable to make it to the bathroom in time and has an accident. She also sometimes has episodes of enuresis, most recently 1 week ago. COVID Questions:   Experiencing any of the following symptoms: fever, chills, cough, SOB, diarrhea, URI symptoms. No  Any Sick contacts with fever, cough, diarrhea, SOB, URI symptoms. No  Traveled out of state or out of country. No  Lives alone. Has been staying at home.  Yes    In the ED:  Vitals: Temp 97.1   /97   HR 87   RR 15   SatO2  99% on ra  Labs: CBC wnl, CMP wnl  Imaging: MRI brain w/ multiple parenchymal signal abnormalities suspicious for demyelinating  Disease - no active demyelination. Treatment: methyl-prednisolone 1g IV      Patient Vitals for the past 12 hrs:   Temp Pulse Resp BP SpO2   02/11/21 2230    (!) 176/93    02/11/21 2130    (!) 163/108 100 %   02/11/21 2126     99 %   02/11/21 2125    (!) 177/98 99 %   02/11/21 1814 97.1 °F (36.2 °C) 87 15 (!) 183/97 99 %       Review of Systems  Review of Systems   Constitutional: Negative for activity change, chills and fever. HENT: Negative for congestion, rhinorrhea, sinus pain and sore throat. Eyes: Positive for visual disturbance. Negative for pain and discharge. Respiratory: Negative for cough, chest tightness, shortness of breath and wheezing. Cardiovascular: Negative for chest pain, palpitations and leg swelling. Gastrointestinal: Negative for abdominal pain, constipation, diarrhea, nausea and vomiting. Genitourinary: Negative for dysuria, flank pain, frequency and hematuria. Musculoskeletal: Negative for arthralgias, gait problem and myalgias. Neurological: Negative for dizziness, syncope, speech difficulty, weakness, light-headedness and headaches. Psychiatric/Behavioral: Negative for agitation, behavioral problems, confusion and decreased concentration. Home Medications   Prior to Admission medications    Medication Sig Start Date End Date Taking? Authorizing Provider   amLODIPine (NORVASC) 5 mg tablet Take 1 Tab by mouth daily. 2/10/21   Ricci Sweet MD   ferrous sulfate (IRON) 325 mg (65 mg iron) EC tablet Take 1 Tab by mouth daily. 2/10/20   Elen Dixon NP   ergocalciferol (ERGOCALCIFEROL) 1,250 mcg (50,000 unit) capsule Take 1 Cap by mouth every seven (7) days. 2/10/20   Elen Dixon NP   norethindrone-e estradiol-iron (LOESTRIN FE) 1 mg-20 mcg (24)/75 mg (4) tab Take 1 Tab by mouth daily. 2/7/20   Elen Dixon NP       Allergies  No Known Allergies    Past Medical History  History reviewed. No pertinent past medical history.     Previous Hospitalization(s)  No past surgical history on file. Family History  Family History   Problem Relation Age of Onset    Hypertension Mother     Hypertension Father     Elevated Lipids Father     Cancer Paternal Grandmother     Diabetes Maternal Grandmother        Social History  Alcohol history: Not at all  Smoking history: Smokes 1 Black and Mild per day x 5 years  Illicit drug history: Not at all  Living arrangement: patient lives alone. Ambulates: Independently     Vital Signs  Visit Vitals  BP (!) 176/93   Pulse 87   Temp 97.1 °F (36.2 °C)   Resp 15   Ht 5' 8\" (1.727 m)   Wt 285 lb (129.3 kg)   SpO2 100%   BMI 43.33 kg/m²       Physical Exam  General: No acute distress. Alert. Cooperative. Head: Normocephalic. Atraumatic. Eyes:              Conjunctiva pink. Sclera white. PERRL. EOMI, no pain w/ ocular movements. Ears:              Hearing grossly intact. Nose:             Septum midline. Mucosa pink. No drainage. Throat: Mucosa pink. Moist mucous membranes. No tonsillar exudates or erythema. Palate movement equal bilaterally. Neck: Supple. Normal ROM. No stiffness. Respiratory: CTAB. No w/r/r/c.   Cardiovascular: RRR. Normal S1,S2. No m/r/g. Pulses 2+ throughout. GI: + bowel sounds. Nontender. No rebound tenderness or guarding. Nondistended. Extremities: Absent LE edema. Distal pulses intact. Musculoskeletal: Full ROM in all extremities. Skin: Warm, dry. No rashes. Neuro: CN III-XII intact, impaired vision to R eye. Unable to appreciate objects, count fingers. Strength 5/5 in all extremities. Sensation intact throughout. Laboratory Data  Recent Results (from the past 8 hour(s))   SAMPLES BEING HELD    Collection Time: 02/11/21  6:36 PM   Result Value Ref Range    SAMPLES BEING HELD [email or web address]     COMMENT        Add-on orders for these samples will be processed based on acceptable specimen integrity and analyte stability, which may vary by analyte.    CBC WITH AUTOMATED DIFF    Collection Time: 02/11/21  6:36 PM   Result Value Ref Range    WBC 6.7 3.6 - 11.0 K/uL    RBC 4.34 3.80 - 5.20 M/uL    HGB 12.1 11.5 - 16.0 g/dL    HCT 36.5 35.0 - 47.0 %    MCV 84.1 80.0 - 99.0 FL    MCH 27.9 26.0 - 34.0 PG    MCHC 33.2 30.0 - 36.5 g/dL    RDW 13.2 11.5 - 14.5 %    PLATELET 758 383 - 041 K/uL    MPV 9.9 8.9 - 12.9 FL    NRBC 0.0 0  WBC    ABSOLUTE NRBC 0.00 0.00 - 0.01 K/uL    NEUTROPHILS 59 32 - 75 %    LYMPHOCYTES 28 12 - 49 %    MONOCYTES 9 5 - 13 %    EOSINOPHILS 3 0 - 7 %    BASOPHILS 1 0 - 1 %    IMMATURE GRANULOCYTES 0 0.0 - 0.5 %    ABS. NEUTROPHILS 4.0 1.8 - 8.0 K/UL    ABS. LYMPHOCYTES 1.9 0.8 - 3.5 K/UL    ABS. MONOCYTES 0.6 0.0 - 1.0 K/UL    ABS. EOSINOPHILS 0.2 0.0 - 0.4 K/UL    ABS. BASOPHILS 0.1 0.0 - 0.1 K/UL    ABS. IMM. GRANS. 0.0 0.00 - 0.04 K/UL    DF AUTOMATED     METABOLIC PANEL, COMPREHENSIVE    Collection Time: 02/11/21  6:36 PM   Result Value Ref Range    Sodium 138 136 - 145 mmol/L    Potassium 3.4 (L) 3.5 - 5.1 mmol/L    Chloride 105 97 - 108 mmol/L    CO2 25 21 - 32 mmol/L    Anion gap 8 5 - 15 mmol/L    Glucose 90 65 - 100 mg/dL    BUN 9 6 - 20 MG/DL    Creatinine 0.90 0.55 - 1.02 MG/DL    BUN/Creatinine ratio 10 (L) 12 - 20      GFR est AA >60 >60 ml/min/1.73m2    GFR est non-AA >60 >60 ml/min/1.73m2    Calcium 8.4 (L) 8.5 - 10.1 MG/DL    Bilirubin, total 1.2 (H) 0.2 - 1.0 MG/DL    ALT (SGPT) 25 12 - 78 U/L    AST (SGOT) 16 15 - 37 U/L    Alk. phosphatase 58 45 - 117 U/L    Protein, total 7.5 6.4 - 8.2 g/dL    Albumin 3.4 (L) 3.5 - 5.0 g/dL    Globulin 4.1 (H) 2.0 - 4.0 g/dL    A-G Ratio 0.8 (L) 1.1 - 2.2         Imaging  CXR Results  (Last 48 hours)    None        CT Results  (Last 48 hours)               02/10/21 1827  CT HEAD WO CONT Final result    Impression:  No acute intracranial process seen       Narrative:  EXAM: Head CT       INDICATION: right sided visual loss       COMPARISON: None. TECHNIQUE: Unenhanced CT of the head was performed using 5 mm images. Brain and   bone windows were generated. CT dose reduction was achieved through use of a   standardized protocol tailored for this examination and automatic exposure   control for dose modulation. FINDINGS:   The ventricles and sulci are normal in size, shape and configuration and   midline. There is no significant white matter disease. There is no intracranial   hemorrhage, extra-axial collection, mass, mass effect or midline shift. The   basilar cisterns are open. No acute infarct is identified. The bone windows   demonstrate no abnormalities. The left maxillary sinus has a 2.5 cm mucous   retention cyst.           02/10/21 1827  CTA HEAD NECK W CONT Final result    Impression:  No evidence of large vessel occlusion or hemodynamically significant carotid   stenosis. Narrative:  *PRELIMINARY REPORT*       No large vessel occlusion. Preliminary report was provided by Dr. Beti Paige, the on-call radiologist, at [address]       Final report to follow. *END PRELIMINARY REPORT*       CLINICAL HISTORY: Right-sided visual loss       EXAMINATION:  CT ANGIOGRAPHY HEAD AND NECK       COMPARISON: None       TECHNIQUE:  Following the uneventful administration of iodinated contrast   material, axial CT angiography of the head and neck was performed. Coronal and   sagittal reconstructions were obtained. Manual postprocessing of images was   performed. 3-D  Sagittal maximal intensity projection images were obtained. 3-D   Coronal maximal intensity projections were obtained. CT dose reduction was   achieved through use of a standardized protocol tailored for this examination   and automatic exposure control for dose modulation. Adaptive statistical   iterative reconstruction (ASIR) was utilized. FINDINGS:       CTA NECK:       Great vessels: Normal arch anatomy with the origins patent.        Right subclavian artery: Patent       Left subclavian artery: Patent        Right common carotid artery: Patent        Left common carotid artery: Patent        Cervical right internal carotid artery: Patent with no significant stenosis by   NASCET criteria. Cervical left internal carotid artery: Patent with no significant stenosis by   NASCET criteria. Right vertebral artery: Patent       Left vertebral artery: Patent       The lung apices are clear. The thyroid is homogeneous. No cervical   lymphadenopathy. Large mucus retention cyst in left maxillary sinus. CTA HEAD:       Right cavernous internal carotid artery: Patent       Left cavernous internal carotid artery: Patent       Anterior cerebral arteries: Patent       Anterior communicating artery: Patent       Right middle cerebral artery: Patent       Left middle cerebral artery: Patent       Posterior communicating arteries: Patent       Posterior cerebral arteries: Patent       Basilar artery: Patent       Distal vertebral arteries: Patent       No evidence for intracranial aneurysm or hemodynamically significant stenosis. Assessment and Plan     Vicenta Coles is a 24 y.o. female with a PMHx of HTN, prediabetes who is admitted for newly diagnosed demyelinating disease. R eye vision loss 2/2 demyelinating disease: MRI brain w/ multiple parenchymal signal abnormalities suspicious for demyelinating disease. Imaging also indicates a small left corpus callosum lesion which is more specific for multiple sclerosis. S/p solumed 1g IV in ED.  -Admit to remote tele, vitals per unit routine  -Consult to neurology  -Per neuro on call: appreciate recs       -Solumedrol 1g x5 days       -MRI cervical and thoracic spine w/wo contrast  -UDS pending     Hypertension: BP on admission 183/97. Pt given rx for amlodipine 2 days ago after visit to ED but has not started taking it yet.  Pt has been noted to have elevated BP readings in clinic but has never taken any medication or been worked up for it.   -Start amlodipine 5mg QHS  -Hydral 10mg IV Q0O PRN for systolic >031, diastolic >818  -Will continue to monitor at this time and readjust as BP's trend  -Due to pt's age would consider OP work up for causes of secondary HTN    Prediabetes: A1C 5.6 (2/2020) but previously 5.9.   -Repeat A1C pending  -Diabetic diet  -Will monitor daily blood glucose on CBC, if increases w/ solumed will consider adding SSI    Obesity:  -The pt's Body mass index is 43.33 kg/m². -Encouraging lifestyle modifications and further follow up outpatient      FEN/GI - Diabetic diet. Activity - Ambulate as tolerated  DVT prophylaxis - Lovenox  GI prophylaxis - Not indicated at this time  Fall prophylaxis - Not indicated at this time. Disposition - Admit to Remote Telemetry. Plan to d/c to Home. Code Status - Full. Discussed with patient / caregivers. Next of Kin Name and [address],  Mother - [phone]    Patient Marek Shetty will be discussed with Dr. Berta Gray.     12:49 AM, 02/12/21  Kelley Vazquez MD  Family Medicine Resident       For Billing    Chief Complaint   Patient presents with    Loss of [facility] Problems  Date Reviewed: 3/5/2020          Codes Class Noted POA    Vision loss ICD-10-CM: H54.7  ICD-9-CM: 369.9  2/12/2021 Unknown

## 2021-02-12 NOTE — PROGRESS NOTES
Admission Medication Reconciliation:     Information obtained from:    -Patient  -RxQuery data available¹: Yes     Comments/Recommendations:   Patient able to confirm name, , allergies, and preferred pharmacy  Patient reported taking no medications; however did state she had Amlodipine 5mg PO daily filled at pharmacy yesterday but has not started. Updated PTA medication list     ¹RxQuery pharmacy benefit data reflects medications filled and processed through the patient's insurance, however this data does NOT capture whether the medication was picked up or is currently being taken by the patient. Facility-Administered Medications:       Prior to Admission Medications   Prescriptions Last Dose Informant Taking? amLODIPine (NORVASC) 5 mg tablet  Self Yes   Sig: Take 5 mg by mouth daily. Facility-Administered Medications: None       Please contact the main inpatient pharmacy with any questions or concerns at [phone] and we will direct you to the clinical pharmacist covering this patient's care while in-house. Shmuel Porter PharmRobbie D.

## 2021-02-12 NOTE — PROGRESS NOTES
[facility]   Senior Resident Admission Note    CC: Vision loss R eye    HPI:  Ashia Arango is a 24 y.o. female with PMHx of prediabetes, HTN, who presents to the ER complaining of vision loss of her R eye. Vision changes started about 7 days ago with black spot in central vision which has since expanded to the entire eye. Today she can only tell when the lights are on/off with that eye and cannot make out shapes or colors. Denies HA, N/V, eye pain. Was seen in the ED on 2/10 and CT head, CTA head/neck were normal. She followed up with opthalmology outpatient yesterday for a dilated eye exam and was sent to the ED for further eval/MRI. Of note, she also reports sporadic bouts of urinary incontinence, extremity weakness over the past year. No fam hx of MS. In the ED:   Vitals:   Patient Vitals for the past 4 hrs:   BP SpO2   21 2230 (!) 176/93    21 2130 (!) 163/108 100 %   21 2126  99 %   21 2125 (!) 177/98 99 %       Labs: Unremarkable  Imaging: MRI brain- multiple parenchymal signal abnormalities suspicious for demyelinating disease, no enhancement to definitively suggest active demyelination, normal orbits, no overt optic neuritis   Pt received: 1gm solumedrol     SH: Alcohol Consumption: none, Smokin black and mild per day for 5 years, Living arrangement: alone, Ambulation: independently   Code Status: full     Chart reviewed. Patient seen, examined, and discussed with Dr. Emily Gaspar (PGY-1). See Resident H&P note for more details. Pertinent PE Findings:   Physical Exam  Constitutional:       General: She is not in acute distress. Appearance: Normal appearance. Eyes:      General: No scleral icterus. Right eye: No discharge. Left eye: No discharge. Extraocular Movements: Extraocular movements intact. Conjunctiva/sclera: Conjunctivae normal.      Pupils: Pupils are equal, round, and reactive to light. Comments: Loss of vision to R eye    Neurological:      General: No focal deficit present. Mental Status: She is alert and oriented to person, place, and time. Comments: CN's III-XII in tact           A/P: 20yo female with PMHx prediabetes, HTN being admitted for R eye vision loss 2/2 demyelinating disease. HDS at this time. R eye vision loss 2/2 demyelinating disease: MRI on admission suspicious for demyelinating disease/MS but with no overt optic neuritis. CT head, CTA head and neck with no acute findings on 2/10.  - Admit to remote tele  - Vitals per unit protocol  - Consult to neurology   - Current rec's: continue 1gm IV solumedrol for 5 total days and get MRI cervical and thoracic spine w/ and w/out contrast   - Follow up UDS  - Daily CBC/CMP      I agree with remaining assessment and plan as documented in Dr. Calvin Mims note.       Pt discussed with Dr. Chau Preston (on-call attending physician)    Sandy Eddy DO  Family Medicine Resident PGY-2

## 2021-02-12 NOTE — ED PROVIDER NOTES
Is a 81-year-old female who presents with vision loss to right eye. Patient reports that she has had a gradual loss of vision in the right eye, started with a dark spot in the middle of her visual field. Seen in the ER yesterday, with a CT head that was negative. Told to follow-up with ophthalmology today. Patient reports that she saw ophthalmology, who sent her to the ED for further management. Patient notes that at this time she has no vision in her right eye. Is able to tell when the lights are turned on or off in the room but no other vision. History reviewed. No pertinent past medical history. No past surgical history on file.       Family History:   Problem Relation Age of Onset    Hypertension Mother     Hypertension Father    [facility] Elevated Lipids Father     Cancer Paternal Grandmother     Diabetes Maternal Grandmother        Social History     Socioeconomic History    Marital status: SINGLE     Spouse name: Not on file    Number of children: Not on file    Years of education: Not on file    Highest education level: Not on file   Occupational History    Not on file   Social Needs    Financial resource strain: Not on file    Food insecurity     Worry: Not on file     Inability: Not on file    Transportation needs     Medical: Not on file     Non-medical: Not on file   Tobacco Use    Smoking status: Passive Smoke Exposure - Never Smoker    Smokeless tobacco: Never Used   Substance and Sexual Activity    Alcohol use: No    Drug use: No    Sexual activity: Never   Lifestyle    Physical activity     Days per week: Not on file     Minutes per session: Not on file    Stress: Not on file   Relationships    Social connections     Talks on phone: Not on file     Gets together: Not on file     Attends Tenriism service: Not on file     Active member of club or organization: Not on file     Attends meetings of clubs or organizations: Not on file     Relationship status: Not on file   [facility] Intimate partner violence     Fear of current or ex partner: Not on file     Emotionally abused: Not on file     Physically abused: Not on file     Forced sexual activity: Not on file   Other Topics Concern    Not on file   Social History Narrative    Not on file         ALLERGIES: Patient has no known allergies. Review of Systems   Constitutional: Negative for chills and fever. HENT: Negative for drooling and nosebleeds. Eyes: Positive for visual disturbance. Negative for pain and itching. Respiratory: Negative for choking and stridor. Cardiovascular: Negative for leg swelling. Gastrointestinal: Negative for abdominal distention and rectal pain. Endocrine: Negative for heat intolerance and polyphagia. Genitourinary: Negative for enuresis and genital sores. Musculoskeletal: Negative for arthralgias and joint swelling. Skin: Negative for color change. Allergic/Immunologic: Negative for immunocompromised state. Neurological: Negative for tremors, speech difficulty, light-headedness and headaches. Hematological: Negative for adenopathy. Psychiatric/Behavioral: Negative for dysphoric mood and sleep disturbance. Vitals:    02/11/21 1814 02/11/21 2125 02/11/21 2126   BP: (!) 183/97 (!) 177/98    Pulse: 87     Resp: 15     Temp: 97.1 °F (36.2 °C)     SpO2: 99% 99% 99%   Weight: 129.3 kg (285 lb)     Height: 5' 8\" (1.727 m)              Physical Exam  Vitals signs and nursing note reviewed. Constitutional:       General: She is not in acute distress. Appearance: She is well-developed. She is not ill-appearing, toxic-appearing or diaphoretic. HENT:      Head: Normocephalic. Nose: Nose normal.   Eyes:      General: No scleral icterus. Right eye: No discharge. Left eye: No discharge. Extraocular Movements: Extraocular movements intact. Conjunctiva/sclera: Conjunctivae normal.      Pupils: Pupils are equal, round, and reactive to light.    Neck: Musculoskeletal: Normal range of motion and neck supple. Cardiovascular:      Rate and Rhythm: Regular rhythm. Heart sounds: Normal heart sounds. Pulmonary:      Effort: Pulmonary effort is normal. No respiratory distress. Abdominal:      General: There is no distension. Palpations: Abdomen is soft. Musculoskeletal: Normal range of motion. General: No deformity. Skin:     General: Skin is warm and dry. Neurological:      Mental Status: She is alert. Coordination: Coordination normal.   Psychiatric:         Behavior: Behavior normal.          MDM  Number of Diagnoses or Management Options  Multiple sclerosis ([address]  Vision loss  Diagnosis management comments: Complete vision loss at time of my evaluation. Discussed results of MRI with patient and mother on the phone. Will discuss with neurology regarding high dose steroids. Will admit for further management. Procedures    [facility] Serve Consult for Admission  10:26 PM    ED Room Number: ER06/06  Patient Name and age:  Bonnetta Brittle 24 y.o.  female  Working Diagnosis:   1. Multiple sclerosis ([address]    2. Vision loss        COVID-19 Suspicion:  no  Sepsis present:  no  Reassessment needed: no  Code Status:  Full Code  Readmission: no  Isolation Requirements:  no  Recommended Level of Care:  telemetry  Department:[facility] ED - [phone]  Other:  Pt with vision loss in right eye Seen by ophthalmology today and sent to ED for further work up. MRI showing MS. No FHx. Patient is being admitted to the hospital.  The results of their tests and reasons for their admission have been discussed with them and/or available family. They convey agreement and understanding for the need to be admitted and for their admission diagnosis.

## 2021-02-12 NOTE — PROGRESS NOTES
2/12/2021  10:13 AM  Reason for Admission: Elective - Vision loss. Will remain inpatient until high dose steroids are complete (approx 4 days). Assessment:   [x]In person with pt   []Via p/c with pt   []With family member in person. Who/Relation:     []With family member via p/c. Who/Relation:   []Chart Review    RUR:  7%  Risk Level: [x]Low []Moderate []High  Value-based purchasing: [x] Yes [] No  Bundle patient: [] Yes [x] No   Specify:     Advance Directive: Full Code. No AD on file. Assessment:    Age: 24    Sex: [] Male []Female     Residency: []Private residence [x]Apartment (pt recently moved, but wants mailing address to remain the same) ([address], [address], [address])  []Assisted Living []LTC []Other:   Exterior Steps: 3 flights  Interior Steps: 0    Lives With: []With spouse []Other family members []Underage children [x]Alone []Care provider [x]Other: Family is supportive. Prior functioning:  [x]Independent []Dependent []Partial dependence   Pt requires assistance with: []Bathing []Dressing []Toileting []Ambulation     Prior DME required:  [x]None []RW []Cane []Crutches []Bedside commode []CPAP []Home O2 (Liter/Provider: ) []Nebulizer   []Shower Chair []Wheelchair []Hospital Bed []Jarod []Stair lift []Rollator []Other:    DME available: [x]None []RW []Cane []Crutches []Bedside commode []CPAP []Home O2 (Liter/Provider: ) []Nebulizer   []Shower Chair []Wheelchair []Hospital Bed []Jarod []Stair lift []Rollator []Other:    Rehab history: [x]None []Outpatient PT []Home Health (Provider/Date: ) []SNF (Provider/Date: ) []IPR (Provider/Date: ) []LTC (Provider/Date: )    Discharge Concerns: []Yes [x]No []Unknown   Describe:     Insurer:  [employer]    PCP:  Dax Mcintosh NP   Name of Practice:  [address] [facility]   Current patient: [x]Yes []No   Approximate date of last visit: 07/2020   Access to virtual PCP visits: []Yes [x]No    Pharmacy: [facility] Transport: Family        Transition of care plan:    []Unable to determine at this time. Awaiting clinical progress, and disposition recommendations. [x] Home with outpatient follow-up    [] Home with Outpatient PT and outpatient follow-up   Pt aware of OP appt? []Yes, Provider:   []Not scheduled   Transport provider:     [] Home with family assistance as needed and outpatient follow-up   Family able to assist:    Schedule:  Transport provider:      [] Home with Home Health   - Provider:     []SNF/IPR   -[]Preferences given:   []Listing provided and preferences requested   -Status: []Pending []Accepted:    -Auth required: []Yes []No    -Auth initiated date:   -3 midnight stay required: []Yes []No  Date satisfied:     [] Other:      Baldomero Jarquin MA    Care Management Interventions  PCP Verified by CM: Yes(Ivon)  Mode of Transport at Discharge:  Other (see comment)(Family)  MyChart Signup: No  Discharge Durable Medical Equipment: No  Physical Therapy Consult: Yes  Occupational Therapy Consult: Yes  Speech Therapy Consult: No  Current Support Network: Lives Alone, Family Lives Nearby  Confirm Follow Up Transport: Family  Discharge Location  Discharge Placement: Home with outpatient services

## 2021-02-12 NOTE — PROGRESS NOTES
PHYSICAL THERAPY EVALUATION/DISCHARGE  Patient: Krystin Castillo (3 y.o. 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 Pt currently denies sensation changes and displays BLE strength WNL in all major muscle groups. She scored a 53/56 on the Romero Balance score indicating that at this time she is a low risk for falls. She has no additional acute PT needs and was encouraged to ambulate in halls multiple times a day to prevent deconditioning. Should pt's status change, please feel free to reconsult. Thank you. Functional Outcome Measure: The patient scored 100/100 on the Barthel Index, and a 54/56 on the Romero Balance scale outcome measure which is indicative of 0% functional impairment and that pt is a low risk for falls . Other factors to consider for discharge: None     Further skilled acute physical therapy is not indicated at this time. PLAN :  Recommendation for discharge: (in order for the patient to meet his/her long term goals)  No skilled physical therapy/ follow up rehabilitation needs identified at this time. This discharge recommendation:  A follow-up discussion with the attending provider and/or case management is planned    IF patient discharges home will need the following DME: none       SUBJECTIVE:   Patient stated I'm moving around ok.     OBJECTIVE DATA SUMMARY:   HISTORY:    History reviewed. No pertinent past medical history. No past surgical history on file.     Prior level of function: independent and active, works two jobs  Personal factors and/or comorbidities impacting plan of care:     [address] [place] [address] Environment: Apartment  # Steps to Enter: 39  Rails to Enter: Yes  Hand Rails : Right  Living Alone: Yes  Support Systems: Family member(s)  Tub or Shower Type: Tub/Shower combination    EXAMINATION/PRESENTATION/DECISION MAKING:   Critical Behavior:  Neurologic State: Alert  Orientation Level: Oriented X4  Cognition: Appropriate decision making, Appropriate for age attention/concentration, Appropriate safety awareness, Follows commands(Simultaneous filing. User may not have seen previous data.)  Safety/Judgement: Awareness of environment, Fall prevention, Good awareness of safety precautions, Home safety, Insight into deficits  Hearing:     Skin:  Defer to RN notes  Edema: Defer to RN notes  Range Of Motion:  AROM: Within functional limits           PROM: Within functional limits           Strength:    Strength: Within functional limits                    Tone & Sensation:   Tone: Normal              Sensation: Intact               Coordination:  Coordination: Within functional limits  Vision:   Tracking: Able to track stimulus in all quadrants w/o difficulty  Convergence: Breaks at 9 from nose  Visual Fields: Difficulty detecting stimulus  in right lateral quadrant; Difficulty detecting stimulus in right lower quadrant; Difficulty detecting stimulus in right upper quadrant  Diplopia: No  Functional Mobility:  Bed Mobility:     Supine to Sit: Independent  Sit to Supine: Independent     Transfers:  Sit to Stand: Independent  Stand to Sit: Independent                       Balance:   Sitting: Intact  Standing: Intact  Ambulation/Gait Training:  Distance (ft): 30 Feet (ft)     Ambulation - Level of Assistance: Supervision     Gait Description (WDL): Exceptions to WDL                                    Functional Measure:  Romero Balance Test:    Sitting to Standin  Standing Unsupported: 4  Sitting with Back Unsupported: 4  Standing to Sittin  Transfers: 4  Standing Unsupported with Eyes Closed: 4  Standing Unsupported with Feet Together: 4  Reach Forward with Outstretched Arm: 4   Object: 4  Turn to Look Over Shoulders: 4  Turn 360 Degrees: 4  Alternate Foot on Step/Stool: 4  Standing Unsupported One Foot in Front: 3  Stand on One Leg: 3  Total: 54/56         56=Maximum possible score;   0-20=High fall risk  21-40=Moderate fall risk   41-56=Low fall risk             Barthel Index:        Bathin  Bladder: 10  Bowels: 10  Groomin  Dressing: 10  Feeding: 10  Mobility: 15  Stairs: 10  Toilet Use: 10  Transfer (Bed to Chair and Back): 15  Total: 100/100       The Barthel ADL Index: Guidelines  1. The index should be used as a record of what a patient does, not as a record of what a patient could do. 2. The main aim is to establish degree of independence from any help, physical or verbal, however minor and for whatever reason. 3. The need for supervision renders the patient not independent. 4. A patient's performance should be established using the best available evidence. Asking the patient, friends/relatives and nurses are the usual sources, but direct observation and common sense are also important. However direct testing is not needed. 5. Usually the patient's performance over the preceding 24-48 hours is important, but occasionally longer periods will be relevant. 6. Middle categories imply that the patient supplies over 50 per cent of the effort. 7. Use of aids to be independent is allowed. Bertina Aschoff., Barthel, D.W. (0406). Functional evaluation: the Barthel Index. [address] [facility] (14)2. HARRY Rhodes, Greer Bowling., Hanny Garland.[facility], [address] [place] [address] (). Measuring the change indisability after inpatient rehabilitation; comparison of the responsiveness of the Barthel Index and Functional [place] Measure. Journal of Neurology, Neurosurgery, and Psychiatry, 66(4), 503-498. Silke Arndt, N.J.A, GLORIA Post, & Rekha Yang MRobbieA. (2004.) Assessment of post-stroke quality of life in cost-effectiveness studies: The usefulness of the Barthel Index and the EuroQoL-5D.  Quality of Life Research, 15, 266-40          Physical Therapy Evaluation Charge Determination   History Examination Presentation Decision-Making   LOW Complexity : Zero comorbidities / personal factors that will impact the outcome / POC LOW Complexity : 1-2 Standardized tests and measures addressing body structure, function, activity limitation and / or participation in recreation  LOW Complexity : Stable, uncomplicated  LOW Complexity : FOTO score of       Based on the above components, the patient evaluation is determined to be of the following complexity level: LOW     Pain Ratin/10 HA    Activity Tolerance: WNL      After treatment patient left in no apparent distress:   Supine in bed, Call bell within reach and Caregiver / family present    COMMUNICATION/EDUCATION:   The patients plan of care was discussed with: Occupational therapist and Registered nurse. Fall prevention education was provided and the patient/caregiver indicated understanding., Patient/family have participated as able in goal setting and plan of care. and Patient/family agree to work toward stated goals and plan of care.     Thank you for this referral.  Td Goodwin PT, DPT   Time Calculation: 15 mins

## 2021-02-12 NOTE — CONSULTS
[facility]: [id number] [address] Neurology  [place] [address]  [phone]        Name:   Vy Davis   Medical record #: [id number]  Admission Date: 2/11/2021     Who Consulted: Dr. Lv Dejesus    Reason for Consult:  pt with vision loss and new diagnosis of MS    HISTORY OF PRESENT ILLNESS:     This is a 24 y.o. female who is admitted for vision loss R eye. Ms. Sai Manjarrez presented to the ED complaining of vision loss of her R eye. Vision changes started about 7 days ago with black spot in central vision which has since expanded to the entire eye. Today she can only tell when the lights are on/off with that eye and cannot make out shapes or colors. Denies HA, N/V, eye pain. Was seen in the ED on 2/10 and CT head, CTA head/neck were normal. She followed up with opthalmology outpatient yesterday for a dilated eye exam and was sent to the ED for further eval/MRI. Of note, she also reports sporadic bouts of urinary incontinence, extremity weakness over the past year. The Neurology Service is asked to evaluate for vision loss, possible MS    Neuro-imaging:     CT Head (2/10/21):  No acute intracranial process seen    CTA Head and Neck (2/10/21): No evidence of large vessel occlusion or hemodynamically significant carotid stenosis. MRI Brain (2/11/21): Multiple foci of T2 signal abnormality are concerning for demyelinating disease. The largest of these is in the medial left temporal lobe (3-11, 4-16). A small left corpus callosum lesion (4-15, 3-18) is more specific for multiple sclerosis. There are multiple, tiny, cortical foci in the bilateral frontal and temporal lobes. See annotated key images. No enhancement to definitely suggest active demyelination, though many of these foci may be tiny.     The ventricles and sulci are appropriate for age.  The main intracranial arterial low-voids are normal. No restricted diffusion to suggest acute infarction.       The orbits are normal: the globes, extraocular muscles, lacrimal glands, optic nerves, coronal fat, and preseptal spaces are normal. The left maxillary sinus is nearly completely opacified by a large mucosal retention cyst or polyp. There is a smaller right maxillary polyp.      IMPRESSION  1. Multiple parenchymal signal abnormalities suspicious for demyelinating disease. 2. No enhancement to definitely suggest active demyelination. 3. Normal orbits. No overt optic neuritis. Care Plan discussed with:  Patient x   Family    RN    Care Manager    Consultant/Specialist:         Thank you for allowing the Neurology Service the pleasure of participating in the care of your patient. This patient will be discussed with my collaborating care team physician, Dr. Armand Luis and he may have further recommendations regarding this patient's care      Shanique Winter, [facility]-BC  ====================    Attending Attestation:         NEUROLOGY ATTENDING ADDENDUM:    February 12, 2021    Pt personally seen and examined. Chart reviewed. Agree with advanced NP's history, exam and A/P with changes/additions. Discussed with pt/ mother. Pt says that on 2-4 she woke up and had a black dot in the vision of her right eye. Over several days the black dot expanded, eventually obscuring vision in right eye. Denies any eye pain. Says left eye vision is completely normal.  Denies any prior episodes of vision loss, severe pain in either eye, slurred speech, face numbness. Does say that she feels generally weak from time to time, legs feel weak episodically. Has chronic nocturnal enuresis since childhood, no difficulty controlling urine while awake. No bowel incontinence. Reviewed MRI Brain images from yesterday. One small area of T2 white matter change seen in medial left temporal lobe. One other small area of T2 white matter change in high white matter (radiology describes it as the left side of corpus callosum).   Radiology also describes multiple, tiny, cortical foci in the bilateral frontal and temporal lobes which I cannot appreciate on my review of the images. None of the lesions are enhancing. Pt denies any hx of HSV infection  Denies any recent infections in general      Patient Vitals for the past 4 hrs:   Temp Pulse Resp BP SpO2   02/12/21 1039 98.4 °F (36.9 °C) 100 16 109/66 100 %         Exam     General: awake, resting in bed, finishing breakfast, calm, cooperative    CN:   Face symmetric, facial sensation intact bilaterally. Shoulder shrug symmetric. Soft palate elevates symmetric. Tongue midline. Hearing/ speech normal.  EOMI    Visual Fields:   Normal VF in left eye  Right eye: complete vision loss    Motor: 5/5 in all exts  Sensory: intact LT, temp in all exts  Cerebellar: no rest or postural tremors  DTRs: 2+ biceps and patellars, trace achilles  Plantar response: neutral bilaterally  Gait: normal     Impression/ Plan    24 y.o. female with 7-8 day hx of vision loss in right eye. Evalauted by Ophthalmology as outpatient who sent her to ER for Brain MRI. MRI shows a white matter lesion in left medial temporal lobe that accounts for right visual loss. There is also one other small lesion in the left side of corpus callosum. No lesions are enhancing. D/w patient/ mother that Brain MRI itself isn't strongly suggestive of Multiple Sclerosis (ie could have a temporal lobe lesion from other cause such as HSV encephalitis), though this could be early MS. I recommended that we pause the IV steroids (in case this is CNS infection), see what MRI T-spine and C-spine (with and without contrast show). If lesions seen on either of those studies then that increases the likelihood that patient does have MS. If no lesions seen there, then I recommend patient have Lumbar Puncture to look for any evidence of MS or CNS infection (Meningitis pathogens panel to include hsv). Pt/ mother are agreeable to that plan.      Thank you for the consultation. Dr Elenita Mckeon will take over Neurology Service today after noon      Signed By: Marques Mckeon MD     February 12, 2021           Assessment/Plan:     1. Right eye vision loss:    · BP Goal: Less than 160  · Neurochecks:  Every 4 hours     2. Mobility:   · Has been OOB. · PT/OT to eval for rehab    3. Diet:    · Does not needs SLP    4. VTE prophylaxis  · Per the primary team     Review of Systems: 10 point ROS was performed. Pertinent positives listed in HPI. Negative ROS is as follows. Pt denies: angina, palpitations, paresthesias, weakness, slurred speech, aphasia, confusion, fever, chills, falls, headache, diplopia, back pain, neck pain, prior episodes of vertigo, hallucinations, new medications or dosage changes. Vital signs     Visit Vitals  /66 (BP 1 Location: Left upper arm, BP Patient Position: Sitting)   Pulse 100   Temp 98.4 °F (36.9 °C)   Resp 16   Ht 5' 8\" (1.727 m)   Wt 129.3 kg (285 lb)   SpO2 100%   BMI 43.33 kg/m²             Allergies:   No Known Allergies    Outpatient Meds  No current facility-administered medications on file prior to encounter. Current Outpatient Medications on File Prior to Encounter   Medication Sig Dispense Refill    amLODIPine (NORVASC) 5 mg tablet Take 5 mg by mouth daily.          Inpatient Meds    Current Facility-Administered Medications   Medication Dose Route Frequency Provider Last Rate Last Admin    sodium chloride (NS) flush 5-40 mL  5-40 mL IntraVENous Q8H Sonal Crawley MD   10 mL at 02/12/21 0513    sodium chloride (NS) flush 5-40 mL  5-40 mL IntraVENous PRN Sonal Crawley MD        acetaminophen (TYLENOL) tablet 650 mg  650 mg Oral Q6H PRN Sonal Crawley MD        Or   Ardyth Moritz acetaminophen (TYLENOL) suppository 650 mg  650 mg Rectal Q6H PRN Sonal Crawley MD        polyethylene glycol [facility]) packet 17 g  17 g Oral DAILY PRN Sonal Crawley MD        promethazine (PHENERGAN) tablet 12.5 mg  12.5 mg Oral Q6H PRN [facility], Stephanie Doherty MD        Or    ondansetron [facility]) injection 4 mg  4 mg IntraVENous Q6H PRN Deanne Singleton MD        enoxaparin (LOVENOX) injection 40 mg  40 mg SubCUTAneous Q12H Deanne Singleton MD   40 mg at 02/12/21 1038    labetaloL (NORMODYNE;TRANDATE) 20 mg/4 mL (5 mg/mL) injection 20 mg  20 mg IntraVENous Q2H PRN Umm Smith DO        [START ON 2/13/2021] amLODIPine (NORVASC) tablet 10 mg  10 mg Oral DAILY Rohini Welch MD               History reviewed. No pertinent past medical history. No past surgical history on file. family history includes Cancer in her paternal grandmother; Diabetes in her maternal grandmother; Elevated Lipids in her father; Hypertension in her father and mother. reports that she is a non-smoker but has been exposed to tobacco smoke. She has never used smokeless tobacco. She reports that she does not drink alcohol or use drugs. Lab Results (last 24 hrs)  Recent Results (from the past 24 hour(s))   SAMPLES BEING HELD    Collection Time: 02/11/21  6:36 PM   Result Value Ref Range    SAMPLES BEING HELD [email or web address]     COMMENT        Add-on orders for these samples will be processed based on acceptable specimen integrity and analyte stability, which may vary by analyte. CBC WITH AUTOMATED DIFF    Collection Time: 02/11/21  6:36 PM   Result Value Ref Range    WBC 6.7 3.6 - 11.0 K/uL    RBC 4.34 3.80 - 5.20 M/uL    HGB 12.1 11.5 - 16.0 g/dL    HCT 36.5 35.0 - 47.0 %    MCV 84.1 80.0 - 99.0 FL    MCH 27.9 26.0 - 34.0 PG    MCHC 33.2 30.0 - 36.5 g/dL    RDW 13.2 11.5 - 14.5 %    PLATELET 191 846 - 231 K/uL    MPV 9.9 8.9 - 12.9 FL    NRBC 0.0 0  WBC    ABSOLUTE NRBC 0.00 0.00 - 0.01 K/uL    NEUTROPHILS 59 32 - 75 %    LYMPHOCYTES 28 12 - 49 %    MONOCYTES 9 5 - 13 %    EOSINOPHILS 3 0 - 7 %    BASOPHILS 1 0 - 1 %    IMMATURE GRANULOCYTES 0 0.0 - 0.5 %    ABS. NEUTROPHILS 4.0 1.8 - 8.0 K/UL    ABS. LYMPHOCYTES 1.9 0.8 - 3.5 K/UL    ABS.  MONOCYTES 0.6 0.0 - 1.0 K/UL    ABS. EOSINOPHILS 0.2 0.0 - 0.4 K/UL    ABS. BASOPHILS 0.1 0.0 - 0.1 K/UL    ABS. IMM. GRANS. 0.0 0.00 - 0.04 K/UL    DF AUTOMATED     METABOLIC PANEL, COMPREHENSIVE    Collection Time: 02/11/21  6:36 PM   Result Value Ref Range    Sodium 138 136 - 145 mmol/L    Potassium 3.4 (L) 3.5 - 5.1 mmol/L    Chloride 105 97 - 108 mmol/L    CO2 25 21 - 32 mmol/L    Anion gap 8 5 - 15 mmol/L    Glucose 90 65 - 100 mg/dL    BUN 9 6 - 20 MG/DL    Creatinine 0.90 0.55 - 1.02 MG/DL    BUN/Creatinine ratio 10 (L) 12 - 20      GFR est AA >60 >60 ml/min/1.73m2    GFR est non-AA >60 >60 ml/min/1.73m2    Calcium 8.4 (L) 8.5 - 10.1 MG/DL    Bilirubin, total 1.2 (H) 0.2 - 1.0 MG/DL    ALT (SGPT) 25 12 - 78 U/L    AST (SGOT) 16 15 - 37 U/L    Alk.  phosphatase 58 45 - 117 U/L    Protein, total 7.5 6.4 - 8.2 g/dL    Albumin 3.4 (L) 3.5 - 5.0 g/dL    Globulin 4.1 (H) 2.0 - 4.0 g/dL    A-G Ratio 0.8 (L) 1.1 - 2.2     METABOLIC PANEL, BASIC    Collection Time: 02/12/21  1:30 AM   Result Value Ref Range    Sodium 135 (L) 136 - 145 mmol/L    Potassium 3.8 3.5 - 5.1 mmol/L    Chloride 104 97 - 108 mmol/L    CO2 26 21 - 32 mmol/L    Anion gap 5 5 - 15 mmol/L    Glucose 112 (H) 65 - 100 mg/dL    BUN 10 6 - 20 MG/DL    Creatinine 0.87 0.55 - 1.02 MG/DL    BUN/Creatinine ratio 11 (L) 12 - 20      GFR est AA >60 >60 ml/min/1.73m2    GFR est non-AA >60 >60 ml/min/1.73m2    Calcium 8.6 8.5 - 10.1 MG/DL   CBC WITH AUTOMATED DIFF    Collection Time: 02/12/21  1:30 AM   Result Value Ref Range    WBC 7.0 3.6 - 11.0 K/uL    RBC 4.53 3.80 - 5.20 M/uL    HGB 12.6 11.5 - 16.0 g/dL    HCT 38.0 35.0 - 47.0 %    MCV 83.9 80.0 - 99.0 FL    MCH 27.8 26.0 - 34.0 PG    MCHC 33.2 30.0 - 36.5 g/dL    RDW 13.0 11.5 - 14.5 %    PLATELET 538 942 - 557 K/uL    MPV 9.8 8.9 - 12.9 FL    NRBC 0.0 0  WBC    ABSOLUTE NRBC 0.00 0.00 - 0.01 K/uL    NEUTROPHILS 85 (H) 32 - 75 %    LYMPHOCYTES 12 12 - 49 %    MONOCYTES 2 (L) 5 - 13 %    EOSINOPHILS 1 0 - 7 % BASOPHILS 0 0 - 1 %    IMMATURE GRANULOCYTES 0 0.0 - 0.5 %    ABS. NEUTROPHILS 6.0 1.8 - 8.0 K/UL    ABS. LYMPHOCYTES 0.8 0.8 - 3.5 K/UL    ABS. MONOCYTES 0.2 0.0 - 1.0 K/UL    ABS. EOSINOPHILS 0.0 0.0 - 0.4 K/UL    ABS. BASOPHILS 0.0 0.0 - 0.1 K/UL    ABS. IMM.  GRANS. 0.0 0.00 - 0.04 K/UL    DF AUTOMATED     HEMOGLOBIN A1C WITH EAG    Collection Time: 02/12/21  1:30 AM   Result Value Ref Range    Hemoglobin A1c 5.3 4.0 - 5.6 %    Est. average glucose 105 mg/dL

## 2021-02-12 NOTE — PROGRESS NOTES
Addendum:     Received PerfectServe Message from Dr Nayan Burton Neuro-Radiology who reviewed patient's MRI C-spine and T-spine studies; normal, no evidence of white matter lesions. He also reviewed the Brain MRI done last night and notes: On reexamination with careful attention to the right optic nerve, there is subtle, but definite, STIR hyperintensity of the right optic nerve at the orbital apex and within the optic canal (series 7 images 16 through 18) with associated abnormal enhancement (series 11 image 17 and 18 and series 10 image 9). These findings are consistent with right optic neuritis. He also sees 4 intracranial lesions: 2 adjacent ones in the left temporal-periventricular area, one in left body of corpus callosum, and one in left parietal periventricular oriented perpendicular to ventricle. All very suspicious for MS. Discussed the above findings with pt/ mother. Discussed that the right optic neuritic combined with the intracranial white matter lesions (none of which are enhancing), is more suggestive of Multiple Sclerosis as the diagnosis, and I don't think pursuing the lumbar puncture is necessary at this point. However, if pt wants further testing to confirm MS, LP can be done. She will consider whether she wants to pursue this (during this admission or as an outpatient). We discussed what MS is (autoimmune attack on own nerve fibers, resulting in a variety of neurologic symptoms, and showing white matter spots on Brain MRI). We also discussed that she has the option for a 2nd opinion with MS Specialist after discharge (either U or [address] [place] [address]) if she or family desires. Mother says there is no FHx of MS that she is aware of. For now, will resume the IV Solumedrol 1000 mg/ day x 5 days (today will be day 2/5).      Patient/ Mother expressed understanding of above findings  My colleague Dr Dallas Hyatt takes over Neurology Service today  Will ask him to follow up tomorrow

## 2021-02-13 PROBLEM — K56.609 LARGE BOWEL OBSTRUCTION (HCC): Status: ACTIVE | Noted: 2021-02-13

## 2021-02-13 LAB
ANION GAP SERPL CALC-SCNC: 8 MMOL/L (ref 5–15)
BASOPHILS # BLD: 0 K/UL (ref 0–0.1)
BASOPHILS NFR BLD: 0 % (ref 0–1)
BUN SERPL-MCNC: 11 MG/DL (ref 6–20)
BUN/CREAT SERPL: 13 (ref 12–20)
CALCIUM SERPL-MCNC: 8.9 MG/DL (ref 8.5–10.1)
CHLORIDE SERPL-SCNC: 103 MMOL/L (ref 97–108)
CO2 SERPL-SCNC: 24 MMOL/L (ref 21–32)
CREAT SERPL-MCNC: 0.84 MG/DL (ref 0.55–1.02)
DIFFERENTIAL METHOD BLD: ABNORMAL
EOSINOPHIL # BLD: 0 K/UL (ref 0–0.4)
EOSINOPHIL NFR BLD: 0 % (ref 0–7)
ERYTHROCYTE [DISTWIDTH] IN BLOOD BY AUTOMATED COUNT: 13.1 % (ref 11.5–14.5)
GLUCOSE SERPL-MCNC: 135 MG/DL (ref 65–100)
HCT VFR BLD AUTO: 39.1 % (ref 35–47)
HGB BLD-MCNC: 12.8 G/DL (ref 11.5–16)
IMM GRANULOCYTES # BLD AUTO: 0 K/UL (ref 0–0.04)
IMM GRANULOCYTES NFR BLD AUTO: 0 % (ref 0–0.5)
LYMPHOCYTES # BLD: 0.7 K/UL (ref 0.8–3.5)
LYMPHOCYTES NFR BLD: 5 % (ref 12–49)
MCH RBC QN AUTO: 27.5 PG (ref 26–34)
MCHC RBC AUTO-ENTMCNC: 32.7 G/DL (ref 30–36.5)
MCV RBC AUTO: 84.1 FL (ref 80–99)
MONOCYTES # BLD: 0.1 K/UL (ref 0–1)
MONOCYTES NFR BLD: 1 % (ref 5–13)
NEUTS SEG # BLD: 12.6 K/UL (ref 1.8–8)
NEUTS SEG NFR BLD: 94 % (ref 32–75)
NRBC # BLD: 0 K/UL (ref 0–0.01)
NRBC BLD-RTO: 0 PER 100 WBC
PLATELET # BLD AUTO: 341 K/UL (ref 150–400)
PMV BLD AUTO: 10.4 FL (ref 8.9–12.9)
POTASSIUM SERPL-SCNC: 3.9 MMOL/L (ref 3.5–5.1)
RBC # BLD AUTO: 4.65 M/UL (ref 3.8–5.2)
RBC MORPH BLD: ABNORMAL
SODIUM SERPL-SCNC: 135 MMOL/L (ref 136–145)
WBC # BLD AUTO: 13.4 K/UL (ref 3.6–11)

## 2021-02-13 PROCEDURE — 74011250636 HC RX REV CODE- 250/636: Performed by: STUDENT IN AN ORGANIZED HEALTH CARE EDUCATION/TRAINING PROGRAM

## 2021-02-13 PROCEDURE — 36415 COLL VENOUS BLD VENIPUNCTURE: CPT

## 2021-02-13 PROCEDURE — 86235 NUCLEAR ANTIGEN ANTIBODY: CPT

## 2021-02-13 PROCEDURE — 99233 SBSQ HOSP IP/OBS HIGH 50: CPT | Performed by: FAMILY MEDICINE

## 2021-02-13 PROCEDURE — 86038 ANTINUCLEAR ANTIBODIES: CPT

## 2021-02-13 PROCEDURE — 85025 COMPLETE CBC W/AUTO DIFF WBC: CPT

## 2021-02-13 PROCEDURE — 65270000029 HC RM PRIVATE

## 2021-02-13 PROCEDURE — 80048 BASIC METABOLIC PNL TOTAL CA: CPT

## 2021-02-13 PROCEDURE — 74011000258 HC RX REV CODE- 258: Performed by: PSYCHIATRY & NEUROLOGY

## 2021-02-13 PROCEDURE — 94760 N-INVAS EAR/PLS OXIMETRY 1: CPT

## 2021-02-13 PROCEDURE — 74011250637 HC RX REV CODE- 250/637: Performed by: STUDENT IN AN ORGANIZED HEALTH CARE EDUCATION/TRAINING PROGRAM

## 2021-02-13 PROCEDURE — 82164 ANGIOTENSIN I ENZYME TEST: CPT

## 2021-02-13 PROCEDURE — 74011250636 HC RX REV CODE- 250/636: Performed by: PSYCHIATRY & NEUROLOGY

## 2021-02-13 RX ADMIN — Medication 10 ML: at 20:29

## 2021-02-13 RX ADMIN — Medication 10 ML: at 06:27

## 2021-02-13 RX ADMIN — SODIUM CHLORIDE 1000 MG: 900 INJECTION INTRAVENOUS at 16:28

## 2021-02-13 RX ADMIN — ENOXAPARIN SODIUM 40 MG: 100 INJECTION SUBCUTANEOUS at 09:54

## 2021-02-13 RX ADMIN — ENOXAPARIN SODIUM 40 MG: 100 INJECTION SUBCUTANEOUS at 20:29

## 2021-02-13 RX ADMIN — AMLODIPINE BESYLATE 10 MG: 5 TABLET ORAL at 09:54

## 2021-02-13 RX ADMIN — Medication 10 ML: at 12:02

## 2021-02-13 NOTE — PROGRESS NOTES
[address] [place] [address]   Office [phone]  Fax [phone]          Assessment and Plan     Yuval Montez is a 24 y.o. female with a PMH of HTN and prediabetes admitted for newly diagnosed demyelinating disease. Patient was admitted on 2/11/2021.    24 Hour Events: No acute events    R eye vision loss 2/2 demyelinating disease: MRI brain w/ multiple parenchymal signal abnormalities suspicious for demyelinating disease. Imaging also indicates a small left corpus callosum lesion which is more specific for multiple sclerosis, and evidence of optic neuritis in the right eye. S/p solumed 1g IV in ED. MRI C/T spine: w/o demyelinating dz  -Neurology following, appreciate recs       -Solumedrol 1g x5 days       -LP not needed currently, likely MS based on clinical picture and imaging  -UDS pending     Hypertension: BP on admission 183/97. Elevated BP readings in clinic but has never taken any medication or been worked up for it.   -Amlodipine increased to 10mg daily  -Labetalol 10mg IV Y7H PRN for systolic >908, diastolic >419  -Will continue to monitor at this time and readjust as BP's trend  -Due to pt's age would consider OP work up for causes of secondary HTN     Prediabetes: A1C 5.3 (2/13/2020)   -Diabetic diet  -Will monitor daily blood glucose on CBC, if increases w/ solumed will consider adding SSI     Obesity:  -The pt's Body mass index is 43.33 kg/m². -Encouraging lifestyle modifications and further follow up outpatient     FEN/GI - Diabetic diet. Activity - Ambulate as tolerated  DVT prophylaxis - Lovenox  GI prophylaxis - Not indicated at this time  Fall prophylaxis - Not indicated at this time. Disposition - Admit to Remote Telemetry. Plan to d/c to Home. Code Status - Full. Discussed with patient / caregivers.   Next of Kin Name and [address],  Mother - [phone]    Nicki Dickinson MD  Family Medicine Resident         Subjective / Objective     Subjective  Doing well this morning. Feels like she can see a little bit better out of her right eye. No weakness or urine incontinence. Temp (24hrs), Av.1 °F (36.7 °C), Min:97.4 °F (36.3 °C), Max:98.7 °F (37.1 °C)     Objective  Respiratory:   O2 Device: Room air   Visit Vitals  /77 (BP 1 Location: Left upper arm, BP Patient Position: At rest)   Pulse 93   Temp 97.8 °F (36.6 °C)   Resp 18   Ht 5' 8\" (1.727 m)   Wt 285 lb (129.3 kg)   SpO2 99%   BMI 43.33 kg/m²       Physical Exam  General: No acute distress. Alert. Cooperative. Head: Normocephalic. Atraumatic. Eyes:              Conjunctiva pink. Sclera white. PERRL. EOMI, no pain w/ ocular movements. Ears:              Hearing grossly intact. Nose:             Septum midline. Mucosa pink. No drainage. Throat: Mucosa pink. Moist mucous membranes. No tonsillar exudates or erythema. Palate movement equal bilaterally. Neck: Supple. Normal ROM. No stiffness. Respiratory: CTAB. No w/r/r/c.   Cardiovascular: RRR. Normal S1,S2. No m/r/g. Pulses 2+ throughout. GI: + bowel sounds. Nontender. No rebound tenderness or guarding. Nondistended. Extremities: Absent LE edema. Distal pulses intact. Musculoskeletal: Full ROM in all extremities. Skin: Warm, dry. No rashes. Neuro: CN III-XII intact, impaired vision to R eye. Unable to appreciate objects, count fingers. Strength 5/5 in all extremities. Sensation intact throughout. I/O:  Date 21 - 21 0659 21 07 - 21 0659   Shift 0832-8494 3276-8620 24 Hour Total 2446-6596 1643-4927 24 Hour Total   INTAKE   P.O. 1400  1400        P. O. 1400  1400      Shift Total(mL/kg) 1400(10.8)  1400(10.8)      OUTPUT   Urine(mL/kg/hr) 1750(1.1)  1750        Urine Voided 1750  1750      Shift Total(mL/kg) 1750(13.5)  1750(13.5)      NET -350  -350      Weight (kg) 129.3 129.3 129.3 129.3 129.3 129.3       CBC:  Recent Labs     21  0130 21  1836   WBC 7.0 6.7   HGB 12.6 12.1   HCT 38.0 36.5    674       Metabolic Panel:  Recent Labs     02/12/21  0130 02/11/21  1836   * 138   K 3.8 3.4*    105   CO2 26 25   BUN 10 9   CREA 0.87 0.90   * 90   CA 8.6 8.4*   ALB  --  3.4*   ALT  --  25          For Billing    Chief Complaint   Patient presents with    Loss of [address] Problems  Date Reviewed: 3/5/2020          Codes Class Noted POA    Vision loss ICD-10-CM: H54.7  ICD-9-CM: 369.9  2/12/2021 Unknown        * (Principal) Demyelinating disease ([facility] [address] [address] ICD-10-CM: G37.9  ICD-9-CM: 341.9  2/12/2021 Unknown        HTN (hypertension) ICD-10-CM: I10  ICD-9-CM: 401.9  2/12/2021 Unknown        Severe obesity ([facility] [address] ICD-10-CM: E66.01  ICD-9-CM: 278.01  2/7/2020 Yes        Prediabetes ICD-10-CM: R73.03  ICD-9-CM: 790.29  6/8/2017 Yes

## 2021-02-14 LAB
ANION GAP SERPL CALC-SCNC: 7 MMOL/L (ref 5–15)
BASE DEFICIT BLDV-SCNC: 0.3 MMOL/L
BASOPHILS # BLD: 0 K/UL (ref 0–0.1)
BASOPHILS NFR BLD: 0 % (ref 0–1)
BDY SITE: ABNORMAL
BUN SERPL-MCNC: 17 MG/DL (ref 6–20)
BUN/CREAT SERPL: 27 (ref 12–20)
CALCIUM SERPL-MCNC: 6.7 MG/DL (ref 8.5–10.1)
CHLORIDE SERPL-SCNC: 111 MMOL/L (ref 97–108)
CO2 SERPL-SCNC: 21 MMOL/L (ref 21–32)
CREAT SERPL-MCNC: 0.64 MG/DL (ref 0.55–1.02)
DIFFERENTIAL METHOD BLD: ABNORMAL
EOSINOPHIL # BLD: 0 K/UL (ref 0–0.4)
EOSINOPHIL NFR BLD: 0 % (ref 0–7)
ERYTHROCYTE [DISTWIDTH] IN BLOOD BY AUTOMATED COUNT: 13.2 % (ref 11.5–14.5)
GLUCOSE SERPL-MCNC: 87 MG/DL (ref 65–100)
HCO3 BLDV-SCNC: 23 MMOL/L (ref 23–28)
HCT VFR BLD AUTO: 38.9 % (ref 35–47)
HGB BLD-MCNC: 13.1 G/DL (ref 11.5–16)
IMM GRANULOCYTES # BLD AUTO: 0.1 K/UL (ref 0–0.04)
IMM GRANULOCYTES NFR BLD AUTO: 1 % (ref 0–0.5)
LYMPHOCYTES # BLD: 1.2 K/UL (ref 0.8–3.5)
LYMPHOCYTES NFR BLD: 6 % (ref 12–49)
MCH RBC QN AUTO: 28.2 PG (ref 26–34)
MCHC RBC AUTO-ENTMCNC: 33.7 G/DL (ref 30–36.5)
MCV RBC AUTO: 83.8 FL (ref 80–99)
MONOCYTES # BLD: 0.9 K/UL (ref 0–1)
MONOCYTES NFR BLD: 5 % (ref 5–13)
NEUTS SEG # BLD: 15.7 K/UL (ref 1.8–8)
NEUTS SEG NFR BLD: 88 % (ref 32–75)
NRBC # BLD: 0 K/UL (ref 0–0.01)
NRBC BLD-RTO: 0 PER 100 WBC
PCO2 BLDV: 34 MMHG (ref 41–51)
PH BLDV: 7.45 [PH] (ref 7.32–7.42)
PLATELET # BLD AUTO: 332 K/UL (ref 150–400)
PMV BLD AUTO: 10 FL (ref 8.9–12.9)
PO2 BLDV: 84 MMHG (ref 25–40)
POTASSIUM SERPL-SCNC: 3.7 MMOL/L (ref 3.5–5.1)
RBC # BLD AUTO: 4.64 M/UL (ref 3.8–5.2)
SAO2 % BLDV: 97 % (ref 65–88)
SAO2% DEVICE SAO2% SENSOR NAME: ABNORMAL
SODIUM SERPL-SCNC: 139 MMOL/L (ref 136–145)
SPECIMEN SITE: ABNORMAL
WBC # BLD AUTO: 17.9 K/UL (ref 3.6–11)

## 2021-02-14 PROCEDURE — 74011000258 HC RX REV CODE- 258: Performed by: PSYCHIATRY & NEUROLOGY

## 2021-02-14 PROCEDURE — 36415 COLL VENOUS BLD VENIPUNCTURE: CPT

## 2021-02-14 PROCEDURE — 82803 BLOOD GASES ANY COMBINATION: CPT

## 2021-02-14 PROCEDURE — 94760 N-INVAS EAR/PLS OXIMETRY 1: CPT

## 2021-02-14 PROCEDURE — 74011250637 HC RX REV CODE- 250/637: Performed by: STUDENT IN AN ORGANIZED HEALTH CARE EDUCATION/TRAINING PROGRAM

## 2021-02-14 PROCEDURE — 74011250636 HC RX REV CODE- 250/636: Performed by: STUDENT IN AN ORGANIZED HEALTH CARE EDUCATION/TRAINING PROGRAM

## 2021-02-14 PROCEDURE — 65270000029 HC RM PRIVATE

## 2021-02-14 PROCEDURE — 74011250636 HC RX REV CODE- 250/636: Performed by: PSYCHIATRY & NEUROLOGY

## 2021-02-14 PROCEDURE — 85025 COMPLETE CBC W/AUTO DIFF WBC: CPT

## 2021-02-14 PROCEDURE — 80048 BASIC METABOLIC PNL TOTAL CA: CPT

## 2021-02-14 PROCEDURE — 99232 SBSQ HOSP IP/OBS MODERATE 35: CPT | Performed by: FAMILY MEDICINE

## 2021-02-14 RX ADMIN — AMLODIPINE BESYLATE 10 MG: 5 TABLET ORAL at 10:08

## 2021-02-14 RX ADMIN — ENOXAPARIN SODIUM 40 MG: 100 INJECTION SUBCUTANEOUS at 21:08

## 2021-02-14 RX ADMIN — ENOXAPARIN SODIUM 40 MG: 100 INJECTION SUBCUTANEOUS at 10:08

## 2021-02-14 RX ADMIN — Medication 10 ML: at 21:09

## 2021-02-14 RX ADMIN — Medication 10 ML: at 12:57

## 2021-02-14 RX ADMIN — SODIUM CHLORIDE 1000 MG: 900 INJECTION INTRAVENOUS at 16:00

## 2021-02-14 NOTE — PROGRESS NOTES
3097 Patient BP noted to be 162/75. When this Nurse went in to administer PRN Labetelol, and to draw blood for ordered labs,  patient stated that her PIV came out. Charge Nurse notified. ED called but stated that they did not have a tech. [facility] and ICU unable to send anyone to try to place new PIV. Nursing Supervisor Sangeetha Paz notified that patient was a hard stick and that this Nurse needed someone to try to draw blood and place new PIV. NS Tia stated that she would try to find someone. 2990 Patient's BP rechecked = 143/87. PRN Labetelol not needed at this time.

## 2021-02-14 NOTE — PROGRESS NOTES
[address] [place] [address]   Office [phone]  Fax [phone]          Assessment and Plan     Bob Guardado is a 24 y.o. female with a PMH of HTN and prediabetes admitted for newly diagnosed demyelinating disease. Patient was admitted on 2/11/2021.    24 Hour Events: No acute events. Patient lost iv access o/n. R eye vision loss 2/2 demyelinating disease: MRI brain w/ multiple parenchymal signal abnormalities suspicious for demyelinating disease. Imaging also indicates a small left corpus callosum lesion which is more specific for multiple sclerosis, and evidence of optic neuritis in the right eye. MRI C/T spine neg for demyelinating dz.  -Neurology following, appreciate recs       -Solumedrol 1g x5 days (until 2/15)       -LP not needed currently, likely MS based on clinical picture and imaging  -F/u DARBY, ACE, Anti SS-B/A   -Daily CBC w/diff, BMP     Hypertension: BP on admission 183/97. Elevated BP readings in clinic but has never taken any medication or been worked up for it.   -Amlodipine 10mg daily  -Labetalol 10mg IV A8O PRN for systolic >385, diastolic >736  -Will continue to monitor at this time and readjust as BP's trend  -Due to pt's age would consider OP work up for causes of secondary HTN     Prediabetes: A1C 5.3 (2/13/2020)   -Diabetic diet  -Will monitor daily blood glucose on CBC, if increases w/ solumed will consider adding SSI     Obesity: Body mass index is 43.33 kg/m². -Encouraging lifestyle modifications and further follow up outpatient     FEN/GI - Diabetic diet. Activity - Ambulate as tolerated  DVT prophylaxis - Lovenox  GI prophylaxis - Not indicated at this time  Fall prophylaxis - Not indicated at this time. Disposition - Plan to d/c to Home. Code Status - Full. Discussed with patient / caregivers.   Next of Kin Name and Reza Jerez,  Mother - [phone]    Ramon Fritz MD  Family Medicine Resident         Subjective / Objective Subjective  Doing well this morning. Feels like she can see a little bit better out of her right eye. No weakness or urine incontinence. No abd pain/CP, SOB. Temp (24hrs), Av.8 °F (36.6 °C), Min:97.5 °F (36.4 °C), Max:97.9 °F (36.6 °C)     Objective:  Vital signs: (most recent): Blood pressure (!) 154/71, pulse 77, temperature 97.8 °F (36.6 °C), resp. rate 18, height 5' 8\" (1.727 m), weight 285 lb (129.3 kg), SpO2 98 %. Respiratory:   O2 Device: Room air   Visit Vitals  BP (!) 143/87 (BP 1 Location: Left upper arm, BP Patient Position: At rest)   Pulse 67   Temp 97.8 °F (36.6 °C)   Resp 18   Ht 5' 8\" (1.727 m)   Wt 285 lb (129.3 kg)   SpO2 98%   BMI 43.33 kg/m²       Physical Exam  General: No acute distress. Alert. Cooperative. Head: Normocephalic. Atraumatic. Eyes:              Conjunctiva pink. Sclera white. PERRL. EOMI, no pain w/ ocular movements. Ears:              Hearing grossly intact. Neck: Supple. Normal ROM. No stiffness. Respiratory: CTAB. No w/r/r/c.   Cardiovascular: RRR. Normal S1,S2. No m/r/g. Pulses 2+ throughout. GI: + bowel sounds. Nontender. No rebound tenderness or guarding. Nondistended. Extremities: Absent LE edema. Distal pulses intact. Musculoskeletal: Full ROM in all extremities. Skin: Warm, dry. No rashes. Neuro: CN III-XII intact, impaired vision to R eye. Unable to appreciate objects, count fingers. Strength 5/5 in all extremities. Sensation intact throughout. I/O:  Date 21 - 21 0659 21 - 02/15/21 0659   Shift 9667-0842 7300-5149 24 Hour Total 4050-7250 2526-6567 24 Hour Total   INTAKE   P.O. 1400  1400        P. O. 1400  1400      Shift Total(mL/kg) 1400(10.8)  1400(10.8)      OUTPUT   Urine(mL/kg/hr) 1200(0.8)  1200(0.4)        Urine Voided 1200  1200      Shift Total(mL/kg) 1200(9.3)  1200(9.3)        200      Weight (kg) 129.3 129.3 129.3 129.3 129.3 129.3       CBC:  Recent Labs     21  3246 02/12/21  0130 02/11/21  1836   WBC 13.4* 7.0 6.7   HGB 12.8 12.6 12.1   HCT 39.1 38.0 36.5    306 198       Metabolic Panel:  Recent Labs     02/13/21  0422 02/12/21  0130 02/11/21  1836   * 135* 138   K 3.9 3.8 3.4*    104 105   CO2 24 26 25   BUN 11 10 9   CREA 0.84 0.87 0.90   * 112* 90   CA 8.9 8.6 8.4*   ALB  --   --  3.4*   ALT  --   --  25          For Billing    Chief Complaint   Patient presents with    Loss of [address] Problems  Date Reviewed: 3/5/2020          Codes Class Noted POA    Vision loss ICD-10-CM: H54.7  ICD-9-CM: 369.9  2/12/2021 Unknown        * (Principal) Demyelinating disease ([facility] ICD-10-CM: G37.9  ICD-9-CM: 341.9  2/12/2021 Unknown        HTN (hypertension) ICD-10-CM: I10  ICD-9-CM: 401.9  2/12/2021 Unknown        Severe obesity ([facility] [address] ICD-10-CM: E66.01  ICD-9-CM: 278.01  2/7/2020 Yes        Prediabetes ICD-10-CM: R73.03  ICD-9-CM: 790.29  6/8/2017 Yes

## 2021-02-15 VITALS
WEIGHT: 285 LBS | HEIGHT: 68 IN | BODY MASS INDEX: 43.19 KG/M2 | HEART RATE: 62 BPM | DIASTOLIC BLOOD PRESSURE: 76 MMHG | RESPIRATION RATE: 14 BRPM | SYSTOLIC BLOOD PRESSURE: 128 MMHG | OXYGEN SATURATION: 97 % | TEMPERATURE: 98.3 F

## 2021-02-15 LAB
ANION GAP SERPL CALC-SCNC: 6 MMOL/L (ref 5–15)
BASOPHILS # BLD: 0 K/UL (ref 0–0.1)
BASOPHILS NFR BLD: 0 % (ref 0–1)
BUN SERPL-MCNC: 20 MG/DL (ref 6–20)
BUN/CREAT SERPL: 22 (ref 12–20)
CALCIUM SERPL-MCNC: 8.5 MG/DL (ref 8.5–10.1)
CHLORIDE SERPL-SCNC: 103 MMOL/L (ref 97–108)
CO2 SERPL-SCNC: 26 MMOL/L (ref 21–32)
CREAT SERPL-MCNC: 0.93 MG/DL (ref 0.55–1.02)
DIFFERENTIAL METHOD BLD: ABNORMAL
EOSINOPHIL # BLD: 0 K/UL (ref 0–0.4)
EOSINOPHIL NFR BLD: 0 % (ref 0–7)
ERYTHROCYTE [DISTWIDTH] IN BLOOD BY AUTOMATED COUNT: 13.1 % (ref 11.5–14.5)
GLUCOSE SERPL-MCNC: 148 MG/DL (ref 65–100)
HCT VFR BLD AUTO: 39.7 % (ref 35–47)
HGB BLD-MCNC: 13.2 G/DL (ref 11.5–16)
IMM GRANULOCYTES # BLD AUTO: 0.1 K/UL (ref 0–0.04)
IMM GRANULOCYTES NFR BLD AUTO: 1 % (ref 0–0.5)
LYMPHOCYTES # BLD: 0.9 K/UL (ref 0.8–3.5)
LYMPHOCYTES NFR BLD: 7 % (ref 12–49)
MCH RBC QN AUTO: 27.9 PG (ref 26–34)
MCHC RBC AUTO-ENTMCNC: 33.2 G/DL (ref 30–36.5)
MCV RBC AUTO: 83.9 FL (ref 80–99)
MONOCYTES # BLD: 0.1 K/UL (ref 0–1)
MONOCYTES NFR BLD: 1 % (ref 5–13)
NEUTS SEG # BLD: 11.3 K/UL (ref 1.8–8)
NEUTS SEG NFR BLD: 91 % (ref 32–75)
NRBC # BLD: 0 K/UL (ref 0–0.01)
NRBC BLD-RTO: 0 PER 100 WBC
PLATELET # BLD AUTO: 333 K/UL (ref 150–400)
PMV BLD AUTO: 10.2 FL (ref 8.9–12.9)
POTASSIUM SERPL-SCNC: 4.3 MMOL/L (ref 3.5–5.1)
RBC # BLD AUTO: 4.73 M/UL (ref 3.8–5.2)
RBC MORPH BLD: ABNORMAL
SODIUM SERPL-SCNC: 135 MMOL/L (ref 136–145)
WBC # BLD AUTO: 12.4 K/UL (ref 3.6–11)

## 2021-02-15 PROCEDURE — 99238 HOSP IP/OBS DSCHRG MGMT 30/<: CPT | Performed by: FAMILY MEDICINE

## 2021-02-15 PROCEDURE — 85025 COMPLETE CBC W/AUTO DIFF WBC: CPT

## 2021-02-15 PROCEDURE — 99232 SBSQ HOSP IP/OBS MODERATE 35: CPT | Performed by: PSYCHIATRY & NEUROLOGY

## 2021-02-15 PROCEDURE — 74011000258 HC RX REV CODE- 258: Performed by: PSYCHIATRY & NEUROLOGY

## 2021-02-15 PROCEDURE — 74011250637 HC RX REV CODE- 250/637: Performed by: STUDENT IN AN ORGANIZED HEALTH CARE EDUCATION/TRAINING PROGRAM

## 2021-02-15 PROCEDURE — 36415 COLL VENOUS BLD VENIPUNCTURE: CPT

## 2021-02-15 PROCEDURE — 74011250636 HC RX REV CODE- 250/636: Performed by: STUDENT IN AN ORGANIZED HEALTH CARE EDUCATION/TRAINING PROGRAM

## 2021-02-15 PROCEDURE — 94760 N-INVAS EAR/PLS OXIMETRY 1: CPT

## 2021-02-15 PROCEDURE — 74011250636 HC RX REV CODE- 250/636: Performed by: PSYCHIATRY & NEUROLOGY

## 2021-02-15 PROCEDURE — 80048 BASIC METABOLIC PNL TOTAL CA: CPT

## 2021-02-15 RX ORDER — HYDROCHLOROTHIAZIDE 25 MG/1
12.5 TABLET ORAL DAILY
Status: DISCONTINUED | OUTPATIENT
Start: 2021-02-15 | End: 2021-02-15 | Stop reason: HOSPADM

## 2021-02-15 RX ORDER — AMLODIPINE BESYLATE 10 MG/1
10 TABLET ORAL DAILY
Qty: 30 TAB | Refills: 1 | Status: SHIPPED
Start: 2021-02-15 | End: 2021-02-15

## 2021-02-15 RX ORDER — HYDROCHLOROTHIAZIDE 12.5 MG/1
12.5 TABLET ORAL DAILY
Qty: 30 TAB | Refills: 1 | Status: SHIPPED | OUTPATIENT
Start: 2021-02-15 | End: 2021-09-21

## 2021-02-15 RX ORDER — AMLODIPINE BESYLATE 10 MG/1
10 TABLET ORAL DAILY
Qty: 60 TAB | Refills: 2 | Status: SHIPPED
Start: 2021-02-15 | End: 2021-02-15

## 2021-02-15 RX ORDER — AMLODIPINE BESYLATE 10 MG/1
10 TABLET ORAL DAILY
Qty: 30 TAB | Refills: 1 | Status: SHIPPED | OUTPATIENT
Start: 2021-02-15 | End: 2021-09-21

## 2021-02-15 RX ORDER — HYDROCHLOROTHIAZIDE 12.5 MG/1
12.5 TABLET ORAL DAILY
Qty: 30 TAB | Refills: 1 | Status: SHIPPED
Start: 2021-02-15 | End: 2021-02-15 | Stop reason: SDUPTHER

## 2021-02-15 RX ADMIN — Medication 10 ML: at 05:12

## 2021-02-15 RX ADMIN — SODIUM CHLORIDE 1000 MG: 900 INJECTION INTRAVENOUS at 13:58

## 2021-02-15 RX ADMIN — AMLODIPINE BESYLATE 10 MG: 5 TABLET ORAL at 09:39

## 2021-02-15 RX ADMIN — Medication 10 ML: at 13:59

## 2021-02-15 RX ADMIN — HYDROCHLOROTHIAZIDE 12.5 MG: 25 TABLET ORAL at 11:45

## 2021-02-15 RX ADMIN — ENOXAPARIN SODIUM 40 MG: 100 INJECTION SUBCUTANEOUS at 09:39

## 2021-02-15 NOTE — PROGRESS NOTES
[address] [facility] [address] [place] [address]   Office [phone]  Fax [phone]          Assessment and Plan     Erasto Fuchs is a 24 y.o. female with a PMH of HTN and prediabetes admitted for newly diagnosed demyelinating disease. Patient was admitted on 2/11/2021.    24 Hour Events: No acute events. R eye vision loss 2/2 demyelinating disease: MRI brain w/ multiple parenchymal signal abnormalities suspicious for demyelinating disease. Imaging also indicates a small left corpus callosum lesion which is more specific for multiple sclerosis, and evidence of optic neuritis in the right eye. MRI C/T spine neg for demyelinating dz.  -Neurology following, appreciate recs       -Solumedrol 1g x5 days (until 2/15)       -LP not needed currently, likely MS based on clinical picture and imaging  -F/u DARBY, ACE, Anti SS-B/A   -Daily CBC w/diff, BMP     Hypertension: BP on admission 183/97. Elevated BP readings in clinic but has never taken any medication or been worked up for it.   -Amlodipine 10mg daily  -Start HCTZ 12.5mg daily  -Labetalol 10mg IV V1T PRN for systolic >653, diastolic >978  -Will continue to monitor at this time and readjust as BP's trend  -Due to pt's age would consider OP work up for causes of secondary HTN     Prediabetes: A1C 5.3 (2/13/2020)   -Diabetic diet  -Will monitor daily blood glucose on CBC, if increases w/ solumed will consider adding SSI     Obesity: Body mass index is 43.33 kg/m². -Encouraging lifestyle modifications and further follow up outpatient     FEN/GI - Diabetic diet. Activity - Ambulate as tolerated  DVT prophylaxis - Lovenox  GI prophylaxis - Not indicated at this time  Fall prophylaxis - Not indicated at this time. Disposition - Plan to d/c to Home. Code Status - Full. Discussed with patient / caregivers.   Next of Samuel 69 Name and Julio Peraza,  Mother - [phone]    Royce Elise MD  Family Medicine Resident         Subjective / Objective Subjective  Doing well this morning. She can see a lot better today. Almost back to normal. No weakness or urine incontinence. No abd pain/CP, SOB. Temp (24hrs), Av °F (36.7 °C), Min:97.3 °F (36.3 °C), Max:98.4 °F (36.9 °C)     Objective:  Vital signs: (most recent): Blood pressure (!) 158/97, pulse 89, temperature 97.7 °F (36.5 °C), resp. rate 18, height 5' 8\" (1.727 m), weight 285 lb (129.3 kg), SpO2 97 %. Respiratory:   O2 Device: Room air   Visit Vitals  BP (!) 158/97 (BP 1 Location: Left arm, BP Patient Position: At rest)   Pulse 89   Temp 97.7 °F (36.5 °C)   Resp 18   Ht 5' 8\" (1.727 m)   Wt 285 lb (129.3 kg)   SpO2 97%   BMI 43.33 kg/m²       Physical Exam  General: No acute distress. Alert. Cooperative. Head: Normocephalic. Atraumatic. Eyes:              Conjunctiva pink. Sclera white. PERRL. EOMI, no pain w/ ocular movements. Ears:              Hearing grossly intact. Neck: Supple. Normal ROM. No stiffness. Respiratory: CTAB. No w/r/r/c.   Cardiovascular: RRR. Normal S1,S2. No m/r/g. Pulses 2+ throughout. GI: + bowel sounds. Nontender. No rebound tenderness or guarding. Nondistended. Extremities: Absent LE edema. Distal pulses intact. Musculoskeletal: Full ROM in all extremities. Skin: Warm, dry. No rashes. Neuro: CN III-XII intact, impaired vision to R eye. Able to count fingers with her right eye open only in all visual fields. Strength 5/5 in all extremities. Sensation intact throughout. I/O:  Date 21 - 02/15/21 0659 02/15/21 0700 - 21 0659   Shift [id number] 9655-7398 24 Hour Total 1706-0614 7274-3389 24 Hour Total   INTAKE   P.O. 1100  1100        P. O. 1100  1100      Shift Total(mL/kg) 1100(8.5)  1100(8.5)      OUTPUT   Urine(mL/kg/hr) 1200(0.8)  1200(0.4)        Urine Voided 1200  1200      Shift Total(mL/kg) 1200(9.3)  1200(9.3)      NET -100  -100      Weight (kg) 129.3 129.3 129.3 129.3 129.3 129.3       CBC:  Recent Labs 02/15/21  0335 02/14/21  1527 02/13/21  0422   WBC 12.4* 17.9* 13.4*   HGB 13.2 13.1 12.8   HCT 39.7 38.9 39.1    332 994       Metabolic Panel:  Recent Labs     02/15/21  0335 02/14/21  1527 02/13/21  0422   * 139 135*   K 4.3 3.7 3.9    111* 103   CO2 26 21 24   BUN 20 17 11   CREA 0.93 0.64 0.84   * 87 135*   CA 8.5 6.7* 8.9          For Billing    Chief Complaint   Patient presents with    Loss of Vision       Hospital Problems  Date Reviewed: 3/5/2020          Codes Class Noted POA    Vision loss ICD-10-CM: H54.7  ICD-9-CM: 369.9  2/12/2021 Unknown        * (Principal) Demyelinating disease ([facility] [address] ICD-10-CM: G37.9  ICD-9-CM: 341.9  2/12/2021 Unknown        HTN (hypertension) ICD-10-CM: I10  ICD-9-CM: 401.9  2/12/2021 Unknown        Severe obesity ([facility] [address] ICD-10-CM: E66.01  ICD-9-CM: 278.01  2/7/2020 Yes        Prediabetes ICD-10-CM: R73.03  ICD-9-CM: 790.29  6/8/2017 Yes

## 2021-02-15 NOTE — DISCHARGE INSTRUCTIONS
HOME DISCHARGE INSTRUCTIONS    Aniya Castaneda / [id number] : 2000    Admission date: 2021 Discharge date: 2/15/2021     Please bring this form with you to show your care provider at your follow-up appointment. Primary care provider:  Adama Quezada MD    Discharging provider:  Dilcia Pedersen MD  - Family Medicine Resident  Dr. Ralph Cordova - Attending, Family Medicine     You have been admitted to the hospital with the following diagnoses:    ACUTE DIAGNOSES:  · Vision loss [H54.7]   · Multiple Sclerosis  · Hypertension  . . . . . . . . . . . . . . . . . . . . . . . . . . . . . . . . . . . . . . . . . . . . . . . . . . . . . . . . . . . . . . . . . . . . . . . Kevin Confer FOLLOW-UP CARE RECOMMENDATIONS:  You are well enough to be discharged from the hospital. However, because you were staying in a hospital, you are at greater risk of having been exposed to the coronavirus. PLEASE stay inside and quarantine for 14 days to prevent further spread of the coronavirus. Appointments  Follow-up Information     Follow up With Specialties Details Why Contact Info    Rowena Milan NP Neurology Schedule an appointment as soon as possible for a visit  [address] [place] [address]      Rock Benz NP Family Medicine Go on 2021 10am for hospital follow up  [address] [place] [address] [id number]             Please follow up with your PCP regarding:  - Hypertension (high blood pressure)     Please follow up with Neurology regarding:  - Newly diagnosed Multiple Sclerosis    Follow-up tests needed: None    Pending test results: At the time of your discharge the following test results are still pending: Auto-immune labs (DARBY, ACE, Anti SS-B/A). Please make sure you review these results with your outpatient follow-up provider(s).     Specific symptoms to watch for: chest pain, shortness of breath, fever, chills, nausea, vomiting, diarrhea, change in mentation, falling, weakness, bleeding. DIET/what to eat:  Regular Diet    ACTIVITY:  Activity as tolerated    Wound care: none    Equipment needed:  none    What to do if new or unexpected symptoms occur? If you experience any of the above symptoms (or should other concerns or questions arise after discharge) please call your primary care physician. Return to the emergency room if you cannot get hold of your doctor. · It is very important that you keep your follow-up appointment(s). · Please bring discharge papers, medication list (and/or medication bottles) to your follow-up appointments for review by your outpatient provider(s). · Please check the list of medications and be sure it includes every medication (even non-prescription medications) that your provider wants you to take. · It is important that you take the medication exactly as they are prescribed. · Keep your medication in the bottles provided by the pharmacist and keep a list of the medication names, dosages, and times to be taken in your wallet. · Do not take other medications without consulting your doctor. · If you have any questions about your medications or other instructions, please talk to your nurse or care provider before you leave the hospital.     Information obtained by:     I understand that if any problems occur once I am at home I am to contact my physician. These instructions were explained to me and I had the opportunity to ask questions. I understand and acknowledge receipt of the instructions indicated above.                                                                                                                                                Physician's or R.N.'s Signature                                                                  Date/Time                                                                                                                                              Patient or Representative Signature                                                          Date/Time

## 2021-02-15 NOTE — PROGRESS NOTES
Problem: Hypertension  Goal: *Blood pressure within specified parameters  Outcome: Resolved/Met  Goal: *Fluid volume balance  Outcome: Resolved/Met  Goal: *Labs within defined limits  Outcome: Resolved/Met     Problem: Patient Education: Go to Patient Education Activity  Goal: Patient/Family Education  Outcome: Resolved/Met     Problem: Falls - Risk of  Goal: *Absence of Falls  Description: Document Bassem Fall Risk and appropriate interventions in the flowsheet.   Outcome: Resolved/Met  Note: Fall Risk Interventions:            Medication Interventions: Teach patient to arise slowly                   Problem: Patient Education: Go to Patient Education Activity  Goal: Patient/Family Education  Outcome: Resolved/Met

## 2021-02-15 NOTE — PROGRESS NOTES
[facility] Neurology Clinics and [address] at [facility] Neurology Clinics at [place] [address] [address] [facility], [address] [place] [address]   [phone]              Chief Complaint   Patient presents with    Loss of Vision     Current Facility-Administered Medications   Medication Dose Route Frequency Provider Last Rate Last Admin    hydroCHLOROthiazide (HYDRODIURIL) tablet 12.5 mg  12.5 mg Oral DAILY Cherrie Yusuf MD        sodium chloride (NS) flush 5-40 mL  5-40 mL IntraVENous Q8H Rylie Oreilly MD   10 mL at 02/15/21 0512    sodium chloride (NS) flush 5-40 mL  5-40 mL IntraVENous PRN Rylie Oreilly MD        acetaminophen (TYLENOL) tablet 650 mg  650 mg Oral Q6H PRN Rylie Oreilly MD   650 mg at 02/12/21 2010    Or    acetaminophen (TYLENOL) suppository 650 mg  650 mg Rectal Q6H PRN Rylie Oreilly MD        polyethylene glycol [facility]) packet 17 g  17 g Oral DAILY PRN Rylie Oreilly MD        promethazine (PHENERGAN) tablet 12.5 mg  12.5 mg Oral Q6H PRN Rylie Oreilly MD        Or    ondansetron [facility]) injection 4 mg  4 mg IntraVENous Q6H PRN Rylie Oreilly MD        enoxaparin (LOVENOX) injection 40 mg  40 mg SubCUTAneous Q12H Rylie Oreilly MD   40 mg at 02/15/21 8041    labetaloL (NORMODYNE;TRANDATE) 20 mg/4 mL (5 mg/mL) injection 20 mg  20 mg IntraVENous Q2H PRN Keith Moctezuma DO   Stopped at 02/14/21 0451    amLODIPine (NORVASC) tablet 10 mg  10 mg Oral DAILY Alexandrea Welch MD   10 mg at 02/15/21 6161    methylprednisoLONE (Solu-MEDROL) 1 gm in 100 ml NS MBP  1 g IntraVENous DAILY Vasile Rios  mL/hr at 02/14/21 1600 1,000 mg at 02/14/21 1600      No Known Allergies  Social History     Tobacco Use    Smoking status: Passive Smoke Exposure - Never Smoker    Smokeless tobacco: Never Used   Substance Use Topics    Alcohol use: No    Drug use: No     24year-old young woman with right optic neuritis on day 5/5 high-dose Solu-Medrol. This is her first neurologic deficit. I personally reviewed her MRI of the brain and like Dr. Lizbet Alexander, am a bit underwhelmed but certainly she does have a couple of lesions there. Cord is unremarkable. She notes that her vision is markedly improved and she is looking forward to going home    Examination  Visit Vitals  BP (!) 156/88 (BP 1 Location: Left arm, BP Patient Position: Sitting)   Pulse 75   Temp 98.6 °F (37 °C)   Resp 16   Ht 5' 8\" (1.727 m)   Wt 129.3 kg (285 lb)   SpO2 98%   BMI 43.33 kg/m²     Pleasant young woman. Awake alert oriented and conversant. Normal speech and language. Impression/Plan  Optic neuritis in a 78-year-old with a less than pristine MRI which would signify a clinically isolated syndrome but with the lesions on the MRI, no matter how small, and a 24year-old I would not expect them to be present  At this point I think a working diagnosis of multiple sclerosis is in order. She has been counseled about second opinions of the [facility]. Continue Solu-Medrol 3 with 5-day course and then follow with nurse practitioner Patricia Mena, our certified MS specialist as an outpatient    Brunilda Barrios MD      This note was created using voice recognition software. Despite editing, there may be syntax errors.

## 2021-02-15 NOTE — PROGRESS NOTES
2/15/2021  9:05 AM  RUR:  8%  Risk Level: [x]Low []Moderate []High  Value-based purchasing: [x] Yes [] No  Bundle patient: [] Yes [x] No   Specify:     Transition of care plan:  1. Awaiting medical clearance and DC order. Neurology following. Pt on high dose steroids. 2. Home with family assistance as needed. 3. Outpatient follow-up.   4. Pt's family to transport

## 2021-02-15 NOTE — PROGRESS NOTES
0700 Shift report received from [facility]. Emeli ALEJO, I & O covered in report. 0930 In to assess pt. Pt resting without complaints of pain. Pt educated on blood pressure medications. 1000 Per MD give solumedrol at 1400 and discharge pt.     1500 Reviewed discharge instructions with pt and pt mother. Reviewed discharge medications with pt and pt mother. Reviewed follow up instructions with pt and pt mother. Both verbalized understanding of all instructions. Educated pt on blood pressure medications. All questions answered. Removed IV. Request for volunteer to take pt down put in.

## 2021-02-15 NOTE — DISCHARGE SUMMARY
[address] [facility] [address] [place] [address]   Office [phone]  Fax [phone]       Discharge / Transfer / Off-Service Note     Name: Zari Ortiz MRN: [id number]  Sex: Female   YOB: 2000  Age: 24 y.o. PCP: Ulises Arechiga MD     Date of admission: 2/11/2021  Date of discharge/transfer: 2/15/2021    Attending physician at admission: Caterina Tony MD     Attending physician at discharge/transfer: Dr. Harriet Lal    Resident physician at discharge/transfer: Robert Prather MD     Consultants during hospitalization  IP CONSULT TO NEUROLOGY     Admission diagnoses   Vision loss [H54.7]    Recommended follow-up after discharge  1. PCP: Ulises Arechiga MD    Things to follow up on with PCP:  Hypertension management    [facility] Marla Nuñez is a 24 y.o. female with a PMH of HTN and prediabetes admitted for newly diagnosed demyelinating disease. Found to have optic neuritis along with multiple lesions on brain MRI that was concerning for MS. S/p Solumedrol 1g x5d. Vision doing better on discharge. R eye vision loss 2/2 demyelinating disease: likely due to MS. MRI brain w/ multi parenchymal signal abnml yohana for demyelinating dz, small left corpus callosum lesion, and evidence of optic neuritis in the R eye. MRI C/T spine neg for demyelinating dz. S/p Solumedrol 1g x5d.  -F/u DARBY, ACE, Anti SS-B/A  -F/u w/ Neurology      Hypertension: BP on OTHDVXSRZ 584/25. Likely 2/2 metabolic syndrome.   -Amlodipine 10mg daily  -HCTZ 12.5mg daily  -Due to pt's age would consider OP work up for causes of secondary HTN     Prediabetes: A1C 5.3 (2/13/2020)   -Monitor outpatient     Obesity: Body mass index is 43.33 kg/m². -Encourage lifestyle modifications and further follow up outpatient    Physical exam at discharge:    [facility].   Visit Vitals  BP (!) 156/88 (BP 1 Location: Left arm, BP Patient Position: Sitting)   Pulse 75   Temp 98.6 °F (37 °C)   Resp 16   Ht 5' 8\" (1.727 m)   Wt 285 lb (129.3 kg)   SpO2 98%   BMI 43.33 kg/m²        Physical Exam  General: No acute distress. Alert. Cooperative. Head: Normocephalic. Atraumatic. Eyes:              Conjunctiva pink. Sclera white. PERRL. EOMI, no pain w/ ocular movements. Ears:              Hearing grossly intact. Neck: Supple. Normal ROM. No stiffness. Respiratory: CTAB. No w/r/r/c.   Cardiovascular: RRR. Normal S1,S2. No m/r/g. Pulses 2+ throughout. GI: + bowel sounds. Nontender. No rebound tenderness or guarding. Nondistended. Extremities: Absent LE edema. Distal pulses intact. Musculoskeletal: Full ROM in all extremities. Skin: Warm, dry. No rashes. Neuro: CN III-XII intact, impaired vision to R eye. Able to count fingers with her right eye open only in all visual fields. Strength 5/5 in all extremities. Sensation intact throughout. Condition at discharge: Stable    Labs  Recent Labs     02/15/21  0335 02/14/21  1527 02/13/21  0422   WBC 12.4* 17.9* 13.4*   HGB 13.2 13.1 12.8   HCT 39.7 38.9 39.1    332 341     Recent Labs     02/15/21  0335 02/14/21  1527 02/13/21  0422   * 139 135*   K 4.3 3.7 3.9    111* 103   CO2 26 21 24   BUN 20 17 11   CREA 0.93 0.64 0.84   * 87 135*   CA 8.5 6.7* 8.9     No results for input(s): ALT, AP, TBIL, TBILI, TP, ALB, GLOB, GGT, AML, LPSE in the last 72 hours. No lab exists for component: SGOT, GPT, AMYP, HLPSE  No results for input(s): PH, PCO2, PO2, TNIPOC, TROIQ, INR, PTP, APTT, FE, TIBC, PSAT, FERR, GLUCPOC, INREXT, INREXT in the last 72 hours. No lab exists for component: [place]    Microbiology  Results     ** No results found for the last 336 hours.  **         Procedures / Diagnostic Studies  Echo Results  (Last 48 hours)    None           Imaging  Mri Brain W Wo Cont    Addendum Date: 2/12/2021    Addendum: Note that the clinical indication in the order for this examination indicated the wrong laterality of the visual findings as \"LEFT eye vision loss\", when the patient's symptoms are actually of RIGHT eye vision loss. On reexamination with careful attention to the right optic nerve, there is subtle, but definite, STIR hyperintensity of the right optic nerve at the orbital apex and within the optic canal (series 7 images 16 through 18) with associated abnormal enhancement (series 11 image 17 and 18 and series 10 image 9). These findings are consistent with right optic neuritis. The findings were discussed with Dr. Wendie Martínez by [facility] messages at 2:35 PM on 2/12/2021 by Dr. Nedra Britton. 789     Result Date: 2/12/2021  1. Multiple parenchymal signal abnormalities suspicious for demyelinating disease. 2. No enhancement to definitely suggest active demyelination. 3. Normal orbits. No overt optic neuritis. Mri Cerv Spine W Wo Cont    Result Date: 2/12/2021  1. No evidence of demyelinating disease in the cervical spine. Normal cervical spine MRI. Mri Thorac Spine W Wo Cont    Result Date: 2/12/2021  1. No evidence of demyelinating disease in the thoracic spine. Normal thoracic spine MRI. Chronic diagnoses   Problem List as of 2/15/2021 Date Reviewed: 3/5/2020          Codes Class Noted - Resolved    Vision loss ICD-10-CM: H54.7  ICD-9-CM: 369.9  2/12/2021 - Present        * (Principal) Demyelinating disease ([facility] ICD-10-CM: G37.9  ICD-9-CM: 341.9  2/12/2021 - Present        HTN (hypertension) ICD-10-CM: I10  ICD-9-CM: 401.9  2/12/2021 - Present        Severe obesity ([facility] ICD-10-CM: E66.01  ICD-9-CM: 278.01  2/7/2020 - Present        Prediabetes ICD-10-CM: R73.03  ICD-9-CM: 790.29  6/8/2017 - Present              Discharge/Transfer Medications  Current Discharge Medication List      START taking these medications    Details   hydroCHLOROthiazide (HYDRODIURIL) 12.5 mg tablet Take 1 Tab by mouth daily.   Qty: 30 Tab, Refills: 1         CONTINUE these medications which have CHANGED    Details   amLODIPine (NORVASC) 10 mg tablet Take 1 Tab by mouth daily.  Qty: 30 Tab, Refills: 1              Diet:  Regular diet. Activity:  As tolerated    Disposition: Home with family care, lives with mom.     Discharge instructions to patient/family  Please seek medical attention for any new or worsening symptoms particularly fever, chest pain, shortness of breath, abdominal pain, nausea, vomiting    Follow up plans/appointments  Follow-up Information     Follow up With Specialties Details Why Contact Info    Sabine Espinal NP Neurology Schedule an appointment as soon as possible for a visit  [address] [facility] [address]      Nicole Javier NP Family Medicine Go on 2/18/2021 10am for hospital follow up  [facility] [address] [facility]  [phone]      Miguel A Bills [address]  [phone]      Mary Price NP Nurse Practitioner   [address]  [address] [facility]  [address]  [phone]             Nadia Antunez MD  Family Medicine Resident     For Billing    Chief Complaint   Patient presents with    Loss of [address] Problems  Date Reviewed: 3/5/2020          Codes Class Noted POA    Vision loss ICD-10-CM: H54.7  ICD-9-CM: 369.9  2/12/2021 Unknown        * (Principal) Demyelinating disease ([facility] ICD-10-CM: G37.9  ICD-9-CM: 341.9  2/12/2021 Unknown        HTN (hypertension) ICD-10-CM: I10  ICD-9-CM: 401.9  2/12/2021 Unknown        Severe obesity ([facility] ICD-10-CM: E66.01  ICD-9-CM: 278.01  2/7/2020 Yes        Prediabetes ICD-10-CM: R73.03  ICD-9-CM: 790.29  6/8/2017 Yes

## 2021-02-16 ENCOUNTER — PATIENT OUTREACH (OUTPATIENT)
Dept: CASE MANAGEMENT | Age: 21
End: 2021-02-16

## 2021-02-16 LAB
ACE SERPL-CCNC: 43 U/L (ref 14–82)
ANA SER QL: NEGATIVE
ENA SS-A AB SER-ACNC: <0.2 AI (ref 0–0.9)
ENA SS-B AB SER-ACNC: <0.2 AI (ref 0–0.9)

## 2021-02-16 NOTE — PROGRESS NOTES
Patient was admitted to [facility] on 2021 and discharged on 2021 for Demyelinating disease. Outreach made within 2 business days of discharge: Yes    Top Discharge Challenges to be reviewed by the provider   Additional needs identified to be addressed with provider no  none  Discussed COVID-19 related testing which was not done at this time. Test results were not done. Patient informed of results, if available? N/A   Method of communication with provider : none       Advance Care Planning:   Does patient have an Advance Directive:  Not discussed at this call. Inpatient Readmission Risk score: Not available   Was this a readmission? no     Care Transition Nurse (CTN) contacted the family by telephone to perform post hospital discharge assessment. Verified name and  with family as identifiers. Provided introduction to self, and explanation of the CTN role. CTN reviewed discharge instructions, medical action plan and red flags with family who verbalized understanding. Family given an opportunity to ask questions and does not have any further questions or concerns at this time. The family agrees to contact the PCP office for questions related to their healthcare. Medication reconciliation was performed with family, who verbalizes understanding of administration of home medications. Advised obtaining a 90-day supply of all daily and as-needed medications. Referral to Pharm D needed: no     Home Health/Outpatient orders at discharge: none    Durable Medical Equipment ordered at discharge: none. [address] [facility] [address]: N/A    Covid Risk Education    Patient has following risk factors of: no known risk factors. Education provided regarding infection prevention, and signs and symptoms of COVID-19 and when to seek medical attention with family who verbalized understanding. Discussed exposure protocols and quarantine From CDC: Are you at higher risk for severe illness? and given an opportunity for questions and concerns. The family agrees to contact the COVID-19 hotline [phone] or PCP office for questions related to COVID-19. For more information on steps you can take to protect yourself, see CDC's How to Protect Yourself     Patient/family/caregiver given information for [employer] and agrees to enroll no. Discussed follow-up appointments. If no appointment was previously scheduled, appointment scheduling offered: Already scheduled. Patient's mother has made contact with Neurology and they will call her back with and appointment. [facility] follow up appointment(s):   Future Appointments   Date Time Provider Anusha Dominguez   2/18/2021 10:00 AM Ami Glass NP [facility]     Non-[facility] follow up appointment(s): None at this time. Plan for follow-up call in 7-10 days based on severity of symptoms and risk factors. CTN provided contact information for future needs. Goals Addressed                 This Visit's Progress       Post Hospitalization     Prevent complications post hospitalization. 2/16/2021 Spoke with patient's Mom on phone. Mom on [facility] authorization form. She reports patient is doing well. Some vision is returning to her right eye. She is feeling well with no pain or headaches. Has not taken BP today but will monitor and keep track of BP readings and report if elevated to PCP. They spoke with Neurology office and are waiting for them to call with appointment. Reviewed discharge summary. Next outreach planned for 2/25/2021.  PAC

## 2021-02-18 ENCOUNTER — OFFICE VISIT (OUTPATIENT)
Dept: FAMILY MEDICINE CLINIC | Age: 21
End: 2021-02-18
Payer: COMMERCIAL

## 2021-02-18 VITALS
WEIGHT: 279 LBS | RESPIRATION RATE: 16 BRPM | HEIGHT: 68 IN | TEMPERATURE: 98.3 F | OXYGEN SATURATION: 98 % | SYSTOLIC BLOOD PRESSURE: 113 MMHG | BODY MASS INDEX: 42.28 KG/M2 | DIASTOLIC BLOOD PRESSURE: 66 MMHG | HEART RATE: 88 BPM

## 2021-02-18 DIAGNOSIS — G37.9 DEMYELINATING DISEASE (HCC): Primary | ICD-10-CM

## 2021-02-18 PROCEDURE — 99213 OFFICE O/P EST LOW 20 MIN: CPT | Performed by: NURSE PRACTITIONER

## 2021-02-18 NOTE — PROGRESS NOTES
HISTORY OF PRESENT ILLNESS  Vy Davis is a 24 y.o. female. HPI  Pt in office today for fuv from hospital  -pt states that she had loss vision on her right eye  -pt states she was diagnosed with MS  Has upcoming appt with neuro, needs return to work note  All sx have completely resolved and feels normal    ROS  A comprehensive review of system was obtained and negative except findings in the HPI    Visit Vitals  /66 (BP 1 Location: Left upper arm, BP Patient Position: Sitting)   Pulse 88   Temp 98.3 °F (36.8 °C) (Oral)   Resp 16   Ht 5' 8\" (1.727 m)   Wt 279 lb (126.6 kg)   LMP  (LMP Unknown)   SpO2 98%   BMI 42.42 kg/m²     Physical Exam  Vitals signs and nursing note reviewed. Constitutional:       Appearance: She is well-developed. Comments:      Neck:      Vascular: No JVD. Cardiovascular:      Rate and Rhythm: Normal rate and regular rhythm. Heart sounds: No murmur. No friction rub. No gallop. Pulmonary:      Effort: Pulmonary effort is normal. No respiratory distress. Breath sounds: Normal breath sounds. No wheezing. Skin:     General: Skin is warm. Neurological:      Mental Status: She is alert and oriented to person, place, and time. ASSESSMENT and PLAN  Encounter Diagnoses   Name Primary?  Demyelinating disease ([address] Yes     No orders of the defined types were placed in this encounter. Note given to return to work  Reviewed referral to Neuro    I have discussed the diagnosis with the patient and the intended plan as seen in the above orders. The patient has received an after-visit summary and questions were answered concerning future plans. Patient conveyed understanding of the plan at the time of the visit.     Jyoti Arizmendi, MSN, ANP  2/18/2021

## 2021-02-18 NOTE — LETTER
2/18/2021 Ms. Vy Davis [address] [facility] Patient was seen in the office today and is cleared to return to work full duty no restrictions 2/19/2021.  
 
 
 
Sincerely, 
 
 
Vicky Whaley NP

## 2021-02-18 NOTE — PROGRESS NOTES
Chief Complaint   Patient presents with    Follow-up     Pt in office today for fuv from hospital  -pt states that she had loss vision on her right eye  -pt states she was diagnosed with MS    1. Have you been to the ER, urgent care clinic since your last visit? Hospitalized [facility] your [facility] visit[facility]    2. Have you seen or consulted any other health care providers outside of the [address] [place] [address] since your last visit? Include any pap smears or colon screening.  no

## 2021-02-26 ENCOUNTER — OFFICE VISIT (OUTPATIENT)
Dept: NEUROLOGY | Age: 21
End: 2021-02-26
Payer: COMMERCIAL

## 2021-02-26 VITALS
TEMPERATURE: 97.2 F | HEIGHT: 68 IN | HEART RATE: 88 BPM | BODY MASS INDEX: 43.35 KG/M2 | SYSTOLIC BLOOD PRESSURE: 118 MMHG | OXYGEN SATURATION: 97 % | WEIGHT: 286 LBS | DIASTOLIC BLOOD PRESSURE: 82 MMHG

## 2021-02-26 DIAGNOSIS — H46.9 OPTIC NEURITIS: Primary | ICD-10-CM

## 2021-02-26 DIAGNOSIS — H46.9 OPTIC NEURITIS, RIGHT: Primary | ICD-10-CM

## 2021-02-26 PROCEDURE — 99214 OFFICE O/P EST MOD 30 MIN: CPT | Performed by: SPECIALIST

## 2021-02-26 NOTE — PATIENT INSTRUCTIONS
Patient history reviewed including I notes preceding admission at [address] [place] [address] along with the [address] [place] [address] notes and today's encounter. Would like to share the case with the [address] [place] [address] department of neuro-ophthalmology and see if the component parts of the history testing and the exam are fitting together nicely into a logical pattern or not. Will pend revisit here.

## 2021-02-26 NOTE — LETTER
2/26/2021 Patient: Rey Garcia YOB: 2000 Date of Visit: 2/26/2021 Danna Griffin MD 
[address] [place] [address] [address] [employer] Dear Danna Griffin MD, Thank you for referring Ms. Enriqueta Jensen to [facility] for evaluation. My notes for this consultation are attached. If you have questions, please do not hesitate to call me. I look forward to following your patient along with you.  
 
 
Sincerely, 
 
Dipti Mccann MD

## 2021-02-26 NOTE — PROGRESS NOTES
Neurology Consult      Subjective:      Vicenta Coles is a 24 y.o. female who comes in today with the following history. Apparently presented to the [facility] back on the 11th of this month with a history of right eye visual loss for 2 days and no pain component. At the time she had 20/25 corrected acuity left eye and count fingers in her superior field right eye. There was a positive right eye APD and the concerns in the course of investigation included a bilateral granulomatous panuveitis. There were granulomatous KP and vitreous cells snowballs peripheral vascular sheathing etc.  With the differential the surmised and in lieu of the acuteness of things she was sent to the [facility] for further investigation. There she had a head CT unrevealing CTA of the head and neck normal, MRI of the brain showed normal orbits, no enhancing lesions, left medial temporal lobe lesion left corpus callosum lesion and multiple tiny cortical foci in the frontal temporal lobes again all without enhancement. She had regular labs that were unrevealing and an DARBY Sjogren's and an ACE titer that was normal.  MRI of the C-spines was normal as was MRI of the T spines. Was seen by neurology and felt to represent a somewhat atypical optic neuritis but was given the benefit of the doubt and 5 days of IV Solu-Medrol. She said subjectively her vision improved rather dramatically although she retains a right central partial scotoma. There was no preceding trauma rash chills or fever anything reminiscent of Covid no vaccinations immunizations exposure history etc.  At this time confidently believes she received improvement in the right eye function post IV Solu-Medrol. Current Outpatient Medications   Medication Sig Dispense Refill    hydroCHLOROthiazide (HYDRODIURIL) 12.5 mg tablet Take 1 Tab by mouth daily. 30 Tab 1    amLODIPine (NORVASC) 10 mg tablet Take 1 Tab by mouth daily.  30 Tab 1 No Known Allergies  No past medical history on file. No past surgical history on file.    Social History     Socioeconomic History    Marital status: SINGLE     Spouse name: Not on file    Number of children: Not on file    Years of education: Not on file    Highest education level: Not on file   Occupational History    Not on file   Social Needs    Financial resource strain: Not on file    Food insecurity     Worry: Not on file     Inability: Not on file    Transportation needs     Medical: Not on file     Non-medical: Not on file   Tobacco Use    Smoking status: Passive Smoke Exposure - Never Smoker    Smokeless tobacco: Never Used   Substance and Sexual Activity    Alcohol use: No    Drug use: No    Sexual activity: Never   Lifestyle    Physical activity     Days per week: Not on file     Minutes per session: Not on file    Stress: Not on file   Relationships    Social connections     Talks on phone: Not on file     Gets together: Not on file     Attends Amish service: Not on file     Active member of club or organization: Not on file     Attends meetings of clubs or organizations: Not on file     Relationship status: Not on file    Intimate partner violence     Fear of current or ex partner: Not on file     Emotionally abused: Not on file     Physically abused: Not on file     Forced sexual activity: Not on file   Other Topics Concern    Not on file   Social History Narrative    Not on file      Family History   Problem Relation Age of Onset    Hypertension Mother     Hypertension Father     Elevated Lipids Father     Cancer Paternal Grandmother     Diabetes Maternal Grandmother       Visit Vitals  /82 (BP 1 Location: Left upper arm, BP Patient Position: Sitting)   Pulse 88   Temp 97.2 °F (36.2 °C) (Temporal)   Ht 5' 8\" (1.727 m)   Wt 129.7 kg (286 lb)   LMP  (LMP Unknown)   SpO2 97%   BMI 43.49 kg/m²        Review of Systems:   A comprehensive review of systems was negative except for that written in the HPI. Neuro Exam:     Appearance: The patient is well developed, well nourished, provides a coherent history and is in no acute distress. Mental Status: Oriented to time, place and person. Mood and affect appropriate. Cranial Nerves:   Intact visual fields on the left and a hint of a central scotoma right. Fundi are benign on the left and a hint of right temporal optic nerve pallor. ALEX, EOM's full, no nystagmus, no ptosis. Facial sensation is normal. Corneal reflexes are intact. Facial movement is symmetric. Hearing is normal bilaterally. Palate is midline with normal sternocleidomastoid and trapezius muscles are normal. Tongue is midline. Visual acuity near with corrective glasses was 20/400-1 right eye and 20/20 left eye right eye positive APD. Motor:  5/5 strength in upper and lower proximal and distal muscles. Normal bulk and tone. No fasciculations. Reflexes:   Deep tendon reflexes 2+/4 and symmetrical.   Sensory:   Normal to touch, pinprick and vibration. Gait:  Normal gait. Tremor:   No tremor noted. Cerebellar:  No cerebellar signs present. Neurovascular:  Normal heart sounds and regular rhythm, peripheral pulses intact, and no carotid bruits. Assessment:   Right eye optic neuritis? Having a little bit of difficulty in my mind with the smooth transition from beginning to end with the information available. Want to make sure there are no loose ends before a diagnosis like multiple sclerosis is confidently embraced with all its implications as to treatment expectations etc.  Will share the case with [address] [place] [address] of neuro-ophthalmology and go from there. Patient and family will  CD copies of the imaging at [facility] including head CTs CTA and brain MRI etc.  We will also send copies of our neuro notes to them as she was an inpatient and the ophthalmology evaluation and screening prior to the admission at [facility].         Plan: As noted.   Signed by :  Magaly Soliman MD

## 2021-05-27 ENCOUNTER — TELEPHONE (OUTPATIENT)
Dept: NEUROLOGY | Age: 21
End: 2021-05-27

## 2021-05-27 ENCOUNTER — OFFICE VISIT (OUTPATIENT)
Dept: NEUROLOGY | Age: 21
End: 2021-05-27
Payer: COMMERCIAL

## 2021-05-27 VITALS
BODY MASS INDEX: 44.25 KG/M2 | RESPIRATION RATE: 18 BRPM | OXYGEN SATURATION: 99 % | WEIGHT: 292 LBS | SYSTOLIC BLOOD PRESSURE: 122 MMHG | HEIGHT: 68 IN | DIASTOLIC BLOOD PRESSURE: 78 MMHG | HEART RATE: 66 BPM

## 2021-05-27 DIAGNOSIS — G35 MULTIPLE SCLEROSIS (HCC): Primary | ICD-10-CM

## 2021-05-27 PROCEDURE — 99214 OFFICE O/P EST MOD 30 MIN: CPT | Performed by: SPECIALIST

## 2021-05-27 NOTE — PROGRESS NOTES
Neurology Consult      Subjective:      Safia Quintero is a 24 y.o. female who comes in today with her mom. I appreciate the note by Dr. Hieu Mensah and his perusal of notes and information and scans etc.  I am told that the serologic testing for the NMOSD antibody was negative as was the MOG antibody assessment as well. Did not actually receive that information from [facility], but this is what the mother informed me. Patient has had no further symptoms. By way of recall had the right eye retrobulbar optic neuritis and then afterwards had a. At the end of April of self-limited off balanced feeling and dizziness after days resolved. Best fit diagnosis is multiple sclerosis. Went over potential treatment options as a goes to [facility] and teriflunomide. Went over particular concerns that were peculiar to each drug. In both cases we talked about the necessity of keeping track of the drugs with office face-to-face visits and laboratory and potentially other options. Especially in the context of the teriflunomide no pregnancy can be in the picture. Will pend revisit and when the family and patient have bruised the literature and want to come in we can get the medication going and the prerequisite labs in place. Went over the uncertainty of how her disease would go from here in terms of relapses or what that would look like and how it would respond. Current Outpatient Medications   Medication Sig Dispense Refill    hydroCHLOROthiazide (HYDRODIURIL) 12.5 mg tablet Take 1 Tab by mouth daily. 30 Tab 1    amLODIPine (NORVASC) 10 mg tablet Take 1 Tab by mouth daily. 30 Tab 1      No Known Allergies  No past medical history on file. No past surgical history on file.    Social History     Socioeconomic History    Marital status: SINGLE     Spouse name: Not on file    Number of children: Not on file    Years of education: Not on file    Highest education level: Not on file   Occupational History    Not on file   Tobacco Use    Smoking status: Passive Smoke Exposure - Never Smoker    Smokeless tobacco: Never Used   Vaping Use    Vaping Use: Never used   Substance and Sexual Activity    Alcohol use: No    Drug use: No    Sexual activity: Never   Other Topics Concern    Not on file   Social History Narrative    Not on file     Social Determinants of Health     Financial Resource Strain:     Difficulty of Paying Living Expenses:    Food Insecurity:     Worried About Running Out of Food in the Last Year:     [address] in the Last Year:    Transportation Needs:     Lack of Transportation (Medical):  Lack of Transportation (Non-Medical):    Physical Activity:     Days of Exercise per Week:     Minutes of Exercise per Session:    Stress:     Feeling of Stress :    Social Connections:     Frequency of Communication with Friends and Family:     Frequency of Social Gatherings with Friends and Family:     Attends Islam Services:     Active Member of Clubs or Organizations:     Attends Club or Organization Meetings:     Marital Status:    Intimate Partner Violence:     Fear of Current or Ex-Partner:     Emotionally Abused:     Physically Abused:     Sexually Abused:       Family History   Problem Relation Age of Onset    Hypertension Mother     Hypertension Father     Elevated Lipids Father     Cancer Paternal Grandmother     Diabetes Maternal Grandmother       Visit Vitals  /78   Pulse 66   Resp 18   Ht 5' 8\" (1.727 m)   Wt 132.5 kg (292 lb)   SpO2 99%   BMI 44.40 kg/m²        Review of Systems:   A comprehensive review of systems was negative except for that written in the HPI. Neuro Exam:     Appearance: The patient is well developed, well nourished, provides a coherent history and is in no acute distress. Mental Status: Oriented to time, place and person. Mood and affect appropriate. Cranial Nerves:   Intact visual fields.  Fundi are benign on the left and again a hint of right temporal pallor on the optic nerve. ALEX, EOM's full, no nystagmus, no ptosis. Facial sensation is normal. Corneal reflexes are intact. Facial movement is symmetric. Hearing is normal bilaterally. Palate is midline with normal sternocleidomastoid and trapezius muscles are normal. Tongue is midline. Visual acuity near without correction 20/20 OU but definite hesitation in the right eye compared to left. APD positive on the right. On the Amsler grid said that everything was diffusely fuzzy with right eye but perfectly clear on the left. Motor:  5/5 strength in upper and lower proximal and distal muscles. Normal bulk and tone. No fasciculations. Reflexes:   Deep tendon reflexes 2+/4 and symmetrical.   Sensory:   Normal to touch, pinprick and vibration. Gait:  Normal gait. Tremor:   No tremor noted. Cerebellar:  No cerebellar signs present. Neurovascular:  Normal heart sounds and regular rhythm, peripheral pulses intact, and no carotid bruits. Assessment:   Multiple sclerosis. We will give him user-friendly literature on the drugs Tecfidera and teriflunomide. They will make an informed decision and then we can have a revisit and go from there in terms of labs and follow-up. I urged him not to plaza on the decision but should feel comfortable which drug they prefer. Plan:   Revisit pended.   Signed by :  Jaclyn Gray MD

## 2021-05-27 NOTE — PATIENT INSTRUCTIONS
Patient history reviewed patient examined. At this time best default diagnosis is multiple sclerosis. Went over to potential oral therapies with their potential side effects and precautions. We will give them user-friendly literature that they can look at and make an informed decision about which product they prefer. Revisit pended for now.

## 2021-05-27 NOTE — PROGRESS NOTES
Chief Complaint   Patient presents with    Follow-up     MS     1. Have you been to the ER, urgent care clinic since your last visit? Hospitalized since your last visit? Yes 02/2021 [address] [place] [address] for loss of eyesight or rt eye    2. Have you seen or consulted any other health care providers outside of the [address] [place] [address] since your last visit? Include any pap smears or colon screening.  No     Visit Vitals  /78   Pulse 66   Resp 18   Ht 5' 8\" (1.727 m)   Wt 132.5 kg (292 lb)   SpO2 99%   BMI 44.40 kg/m²

## 2021-05-27 NOTE — LETTER
5/27/2021 Patient: Gail Watson YOB: 2000 Date of Visit: 5/27/2021 Nam Correa MD 
[address] [place] [address] [address] [employer] Dear Nam Correa MD, Thank you for referring Ms. Cristina Casarez to [facility] for evaluation. My notes for this consultation are attached. If you have questions, please do not hesitate to call me. I look forward to following your patient along with you.  
 
 
Sincerely, 
 
Fransisco Nevarez MD

## 2021-05-27 NOTE — LETTER
5/27/2021 11:51 AM 
 
Ms. Juan Chinchilla [address] [facility] Dear Sunitha Ravi, 
  
This is to inform the interested parties that this patient of mine-  Ms. Wal[facility]acharlie [facility] does in fact have a diagnosis of multiple sclerosis. This comes from testing of various sorts and also a second opinion rendered by the academic center [facility] and Dr. Arna Fabry.   She will be followed in my clinic regarding treatment and testing options at this point. 
  
Thank you for your considerations. 
  
 
 
Sincerely, 
 
 
Chau Harry MD

## 2021-09-16 ENCOUNTER — TELEPHONE (OUTPATIENT)
Dept: FAMILY MEDICINE CLINIC | Age: 21
End: 2021-09-16

## 2021-09-16 NOTE — TELEPHONE ENCOUNTER
Mother Ray Hyatt states that patient was diagnosed with MS & wants to know if she should get COVID vaccine.  She wanted message given to Brigid Rosenberg NP

## 2021-09-21 RX ORDER — HYDROCHLOROTHIAZIDE 12.5 MG/1
TABLET ORAL
Qty: 30 TABLET | Refills: 0 | Status: SHIPPED | OUTPATIENT
Start: 2021-09-21 | End: 2021-12-22

## 2021-09-21 RX ORDER — AMLODIPINE BESYLATE 10 MG/1
TABLET ORAL
Qty: 30 TABLET | Refills: 0 | Status: SHIPPED | OUTPATIENT
Start: 2021-09-21 | End: 2021-12-22

## 2021-10-02 ENCOUNTER — HOSPITAL ENCOUNTER (EMERGENCY)
Age: 21
Discharge: HOME | End: 2021-10-02
Attending: EMERGENCY MEDICINE
Payer: COMMERCIAL

## 2021-10-02 ENCOUNTER — APPOINTMENT (OUTPATIENT)
Dept: GENERAL RADIOLOGY | Age: 21
End: 2021-10-02
Attending: EMERGENCY MEDICINE
Payer: COMMERCIAL

## 2021-10-02 VITALS
RESPIRATION RATE: 18 BRPM | OXYGEN SATURATION: 100 % | SYSTOLIC BLOOD PRESSURE: 156 MMHG | HEIGHT: 68 IN | DIASTOLIC BLOOD PRESSURE: 95 MMHG | BODY MASS INDEX: 38.8 KG/M2 | TEMPERATURE: 97.7 F | HEART RATE: 75 BPM | WEIGHT: 256 LBS

## 2021-10-02 DIAGNOSIS — R07.89 ATYPICAL CHEST PAIN: Primary | ICD-10-CM

## 2021-10-02 LAB
ALBUMIN SERPL-MCNC: 3.8 G/DL (ref 3.5–5)
ALBUMIN/GLOB SERPL: 0.9 {RATIO} (ref 1.1–2.2)
ALP SERPL-CCNC: 57 U/L (ref 45–117)
ALT SERPL-CCNC: 27 U/L (ref 12–78)
ANION GAP SERPL CALC-SCNC: 6 MMOL/L (ref 5–15)
AST SERPL-CCNC: 15 U/L (ref 15–37)
BASOPHILS # BLD: 0 K/UL (ref 0–0.1)
BASOPHILS NFR BLD: 1 % (ref 0–1)
BILIRUB SERPL-MCNC: 1.1 MG/DL (ref 0.2–1)
BUN SERPL-MCNC: 9 MG/DL (ref 6–20)
BUN/CREAT SERPL: 10 (ref 12–20)
CALCIUM SERPL-MCNC: 9.2 MG/DL (ref 8.5–10.1)
CHLORIDE SERPL-SCNC: 106 MMOL/L (ref 97–108)
CO2 SERPL-SCNC: 26 MMOL/L (ref 21–32)
COMMENT, HOLDF: NORMAL
CREAT SERPL-MCNC: 0.9 MG/DL (ref 0.55–1.02)
DIFFERENTIAL METHOD BLD: NORMAL
EOSINOPHIL # BLD: 0.1 K/UL (ref 0–0.4)
EOSINOPHIL NFR BLD: 2 % (ref 0–7)
ERYTHROCYTE [DISTWIDTH] IN BLOOD BY AUTOMATED COUNT: 13.2 % (ref 11.5–14.5)
GLOBULIN SER CALC-MCNC: 4.2 G/DL (ref 2–4)
GLUCOSE SERPL-MCNC: 100 MG/DL (ref 65–100)
HCT VFR BLD AUTO: 39 % (ref 35–47)
HGB BLD-MCNC: 13.2 G/DL (ref 11.5–16)
IMM GRANULOCYTES # BLD AUTO: 0 K/UL (ref 0–0.04)
IMM GRANULOCYTES NFR BLD AUTO: 0 % (ref 0–0.5)
LYMPHOCYTES # BLD: 1.2 K/UL (ref 0.8–3.5)
LYMPHOCYTES NFR BLD: 23 % (ref 12–49)
MCH RBC QN AUTO: 27.1 PG (ref 26–34)
MCHC RBC AUTO-ENTMCNC: 33.8 G/DL (ref 30–36.5)
MCV RBC AUTO: 80.1 FL (ref 80–99)
MONOCYTES # BLD: 0.4 K/UL (ref 0–1)
MONOCYTES NFR BLD: 9 % (ref 5–13)
NEUTS SEG # BLD: 3.3 K/UL (ref 1.8–8)
NEUTS SEG NFR BLD: 65 % (ref 32–75)
NRBC # BLD: 0 K/UL (ref 0–0.01)
NRBC BLD-RTO: 0 PER 100 WBC
PLATELET # BLD AUTO: 333 K/UL (ref 150–400)
PMV BLD AUTO: 10.3 FL (ref 8.9–12.9)
POTASSIUM SERPL-SCNC: 3.9 MMOL/L (ref 3.5–5.1)
PROT SERPL-MCNC: 8 G/DL (ref 6.4–8.2)
RBC # BLD AUTO: 4.87 M/UL (ref 3.8–5.2)
SAMPLES BEING HELD,HOLD: NORMAL
SODIUM SERPL-SCNC: 138 MMOL/L (ref 136–145)
TROPONIN I SERPL-MCNC: <0.05 NG/ML
TROPONIN I SERPL-MCNC: <0.05 NG/ML
WBC # BLD AUTO: 5.1 K/UL (ref 3.6–11)

## 2021-10-02 PROCEDURE — 93005 ELECTROCARDIOGRAM TRACING: CPT

## 2021-10-02 PROCEDURE — 99283 EMERGENCY DEPT VISIT LOW MDM: CPT

## 2021-10-02 PROCEDURE — 84484 ASSAY OF TROPONIN QUANT: CPT

## 2021-10-02 PROCEDURE — 85025 COMPLETE CBC W/AUTO DIFF WBC: CPT

## 2021-10-02 PROCEDURE — 80053 COMPREHEN METABOLIC PANEL: CPT

## 2021-10-02 PROCEDURE — 71046 X-RAY EXAM CHEST 2 VIEWS: CPT

## 2021-10-02 PROCEDURE — 36415 COLL VENOUS BLD VENIPUNCTURE: CPT

## 2021-10-02 NOTE — ED PROVIDER NOTES
Patient is a 20-year-old woman with a history of demyelinating disease and hypertension. She comes into the emergency department with chest pain that has been intermittent for the last week. She said the pain started shortly after smoking marijuana but that she has not smoked marijuana in the last week and the pain is persisted. She describes the pain as a sharp shooting pain that radiates to her bilateral shoulders, up into her neck, and down into her left leg. She has had some shortness of breath associated with this and some lightheadedness. She has not had any fevers or chills and denies cough. She has not found any triggers or exacerbating factors; she has not found anything that improves her symptoms. Her symptoms are not worse with or triggered by exertion. The history is provided by the patient. No past medical history on file. No past surgical history on file.       Family History:   Problem Relation Age of Onset    Hypertension Mother     Hypertension Father    [facility] Elevated Lipids Father     Cancer Paternal Grandmother     Diabetes Maternal Grandmother        Social History     Socioeconomic History    Marital status: SINGLE     Spouse name: Not on file    Number of children: Not on file    Years of education: Not on file    Highest education level: Not on file   Occupational History    Not on file   Tobacco Use    Smoking status: Passive Smoke Exposure - Never Smoker    Smokeless tobacco: Never Used   Vaping Use    Vaping Use: Never used   Substance and Sexual Activity    Alcohol use: No    Drug use: No    Sexual activity: Never   Other Topics Concern    Not on file   Social History Narrative    Not on file     Social Determinants of Health     Financial Resource Strain:     Difficulty of Paying Living Expenses:    Food Insecurity:     Worried About Running Out of Food in the Last Year:     [address] in the Last Year:    Transportation Needs:     Lack of Transportation (Medical):  Lack of Transportation (Non-Medical):    Physical Activity:     Days of Exercise per Week:     Minutes of Exercise per Session:    Stress:     Feeling of Stress :    Social Connections:     Frequency of Communication with Friends and Family:     Frequency of Social Gatherings with Friends and Family:     Attends Rastafari Services:     Active Member of Clubs or Organizations:     Attends Club or Organization Meetings:     Marital Status:    Intimate Partner Violence:     Fear of Current or Ex-Partner:     Emotionally Abused:     Physically Abused:     Sexually Abused: ALLERGIES: Patient has no known allergies. Review of Systems   Constitutional: Positive for chills. Negative for fever. Respiratory: Positive for shortness of breath. Negative for cough. Cardiovascular: Positive for chest pain. Negative for leg swelling. Gastrointestinal: Negative for abdominal pain, constipation, diarrhea, nausea and vomiting. Neurological: Positive for light-headedness. Negative for dizziness. All other systems reviewed and are negative. Vitals:    10/02/21 1347   BP: (!) 172/94   Pulse: 74   Resp: 16   Temp: 97.3 °F (36.3 °C)   SpO2: 99%   Weight: 116.1 kg (256 lb)   Height: 5' 8\" (1.727 m)            Physical Exam  Vitals and nursing note reviewed. Constitutional:       Appearance: She is well-developed. HENT:      Head: Normocephalic and atraumatic. Eyes:      Pupils: Pupils are equal, round, and reactive to light. Cardiovascular:      Rate and Rhythm: Normal rate and regular rhythm. Pulmonary:      Effort: Pulmonary effort is normal.      Breath sounds: Normal breath sounds. Abdominal:      General: There is no distension. Palpations: Abdomen is soft. Tenderness: There is no abdominal tenderness. Musculoskeletal:      Cervical back: Normal range of motion and neck supple. Skin:     General: Skin is warm and dry.       Capillary Refill: Capillary refill takes less than 2 seconds. Neurological:      General: No focal deficit present. Mental Status: She is alert and oriented to person, place, and time. Psychiatric:         Mood and Affect: Mood normal.         Behavior: Behavior normal.          MDM       Procedures      EKG at 1:35 PM  Normal sinus rhythm, sinus arrhythmia, 67 bpm, normal axis, normal intervals, no ST changes, no T wave changes, no ectopy  EKG interpreted by Taylor Escmailla MD     The patient is resting comfortably and feels better, is alert and in no distress. The repeat examination is unremarkable and benign. The electrocardiogram shows no signs of acute ischemia and the history, exam, diagnostic testing and current condition do not suggest that this patient is having an acute myocardial infarction, significant arrhythmia, unstable angina, esophageal perforation, pulmonary embolism, aortic dissection, pneumothorax, severe pneumonia, sepsis or other significant pathology that would warrant further testing, continued ED treatment, admission, or cardiology or other specialist consultation at this point. The vital signs have been stable. The patient's condition is stable and appropriate for discharge. The patient will pursue further outpatient evaluation with the primary care physician, other designated physician or cardiologist. The patient and/or caregivers have expressed a clear and thorough understanding and agree to follow up as instructed.

## 2021-10-02 NOTE — ED TRIAGE NOTES
Reports sharp chest pain that comes and goes since Sunday with some SOB. Patient is ambulatory in Triage. Reports lightheadedness with chills and weakness.

## 2021-10-04 ENCOUNTER — PATIENT OUTREACH (OUTPATIENT)
Dept: CASE MANAGEMENT | Age: 21
End: 2021-10-04

## 2021-10-04 LAB
ATRIAL RATE: 67 BPM
CALCULATED P AXIS, ECG09: 39 DEGREES
CALCULATED R AXIS, ECG10: 69 DEGREES
CALCULATED T AXIS, ECG11: 53 DEGREES
DIAGNOSIS, 93000: NORMAL
P-R INTERVAL, ECG05: 150 MS
Q-T INTERVAL, ECG07: 398 MS
QRS DURATION, ECG06: 106 MS
QTC CALCULATION (BEZET), ECG08: 420 MS
VENTRICULAR RATE, ECG03: 67 BPM

## 2021-10-04 NOTE — PROGRESS NOTES
Ambulatory Care Management Note    Date/Time:  10/4/2021 2:37 PM    This patient was received as a referral from Daily assignment. Ambulatory Care Manager outreached to patient today to offer care management services. Introduction to self and role of care manager provided. Patient accepted care management services at this time. Follow up call scheduled at this time. Patient has Ambulatory Care Manager's contact number for for any questions or concerns. Mother calling Cardiology for earlier appointment.

## 2021-10-12 ENCOUNTER — PATIENT OUTREACH (OUTPATIENT)
Dept: CASE MANAGEMENT | Age: 21
End: 2021-10-12

## 2021-10-13 NOTE — PROGRESS NOTES
Ambulatory Care Management Note    Date/Time:  10/12/2021 03:55 PM    This Ambulatory Care Manager (ACM) reviewed and updated the following screenings during this call; self management assessment    Patient's challenges to self management identified:   functional physical ability      Medication Management:  good understanding    Advance Care Planning:   Does patient have an Advance Directive:  [address] on [facility], Referrals, and Durable Medical Equipment:       Health Maintenance Due   Topic Date Due    Hepatitis C Screening  Never done    DTaP/Tdap/Td series (1 - Tdap) Never done    COVID-19 Vaccine (1) Never done    HPV Age 9Y-34Y (2 - 3-dose series) 2017    Flu Vaccine (1) Never done     Health Maintenance reviewed - yes. Patient was asked to consider health care goals that they would like to focus on with this ACM. ACM will follow up with patient to discuss goals and establish care plan in the next 7-14 days.        PCP/Specialist follow up:   Future Appointments   Date Time Provider Anusha Dominguez   10/20/2021  9:40 AM Carissa Barnard MD [facility] BS AMB

## 2021-10-22 ENCOUNTER — PATIENT OUTREACH (OUTPATIENT)
Dept: CASE MANAGEMENT | Age: 21
End: 2021-10-22

## 2021-10-24 NOTE — PROGRESS NOTES
Ambulatory Care Management Note      Date/Time:  10/22/21 11:53 AM    Top Challenges reviewed with the provider   Challenges to be reviewed by the provider   Additional needs identified to be addressed with provider: yes  · 2 ed visits, 1 hospital admit to [facility] in one year, last ED for chest pain after smoking Marijuana  · Has MS followed by Neurology-Demyelinating disease. Ambulatory  contacted patient for discussion and case management of self care-  Summary of patients top problems:     1. HTN- History of HTN, taking her Hydrodiuril and Norvasc. Initial BP on 10/2/21 was 172/94 and Ed with intermittent chest pain. 2.   Demyelinating disease-ED at [facility] on 10/2 for rapid heart beat and having body shocks. Was advised by the [facility] ER to push up 10/21/21 cardiology appt. Has MS, chest pain started after using Marijuana, loss of vision in one eye, muscle aches and chest pain. PCP/Specialist follow up:   Future Appointments   Date Time Provider Anusha Dominguez   11/2/2021  2:40 PM Caridad Peter  [facility] BS AMB   11/3/2021  2:00 PM Gary Wei MD [facility] BS AMB          Goals Addressed                 This Visit's Progress     Attends follow up appointments on schedule        10/22/21   Educated on importance of keeping all follow up appointments   Will continue to follow up with Cardiology, Neurology and PCP   Will attend the following appointment as scheduled  o 11/2/2021 2:40 PM o Caridad Peter MD o [address] [facility] o BS AMB o   11/3/2021 2:00 PM o Gary Wei MD o [address] will follow in 12-14 days. pmk       Patient/Family verbalizes understanding of self-management of chronic disease. Azeb Graven HTN        10/22/21   Will maintain blood pressure somewhere between 90/60 or 120/80    Will continue to take medications as prescribed    Will notify providers if not feeling good or if headaches or dizziness    Will Keep all  doctor appointments as scheduled   Educated on the Importance of Supportive resources in place to maintain patient in the community (ie. Home Health, DME equipment, refer to, medication assistant plan, [facility] etc.)   ACM contact information given. Will follow up in 10-14 days. [facility]          Patient verbalized understanding of all information discussed. Patient has this Nurse Navigators contact information for any further questions, concerns, or needs.

## 2021-11-02 ENCOUNTER — OFFICE VISIT (OUTPATIENT)
Dept: NEUROLOGY | Age: 21
End: 2021-11-02
Payer: COMMERCIAL

## 2021-11-02 VITALS
TEMPERATURE: 97.5 F | DIASTOLIC BLOOD PRESSURE: 82 MMHG | WEIGHT: 256 LBS | BODY MASS INDEX: 38.8 KG/M2 | SYSTOLIC BLOOD PRESSURE: 130 MMHG | RESPIRATION RATE: 16 BRPM | HEIGHT: 68 IN

## 2021-11-02 DIAGNOSIS — G35 MULTIPLE SCLEROSIS (HCC): Primary | ICD-10-CM

## 2021-11-02 PROCEDURE — 99214 OFFICE O/P EST MOD 30 MIN: CPT | Performed by: SPECIALIST

## 2021-11-02 NOTE — PROGRESS NOTES
Neurology Consult      Subjective:      Casey Diaz is a 22 y.o. female who comes in today with her mom. Has decided on the product Aubagio for her MS. Went over the preliminary as it have to be accomplished before she starts safely. That includes the understanding she has to be on birth control the entire time she is on this product without excuse. Will need pretreatment CBC with differential BMP liver function test. Will need a tuberculin skin test and hopefully can get that done at primary care. For 6-month once she starts the drug will need monthly liver function test and if primary care can take that on fine if not I would be more than glad to do that. Would like to see her back in 3 months and see what progress she is making. Is actually due to see cardiology tomorrow for some persistence of chest pain and tachycardia. We will get the mother to ask the cardiologist if he has any objections to starting the Aubagio with her cardiac circumstances. Does not reference any new problem. 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 Current Outpatient Medications   Medication Sig Dispense Refill    amLODIPine (NORVASC) 10 mg tablet Take 1 tablet by mouth once daily 30 Tablet 0    hydroCHLOROthiazide (HYDRODIURIL) 12.5 mg tablet Take 1 tablet by mouth once daily 30 Tablet 0      No Known Allergies  No past medical history on file. No past surgical history on file.    Social History     Socioeconomic History    Marital status: SINGLE     Spouse name: Not on file    Number of children: Not on file    Years of education: Not on file    Highest education level: Not on file   Occupational History    Not on file   Tobacco Use    Smoking status: Passive Smoke Exposure - Never Smoker    Smokeless tobacco: Never Used   Vaping Use    Vaping Use: Never used   Substance and Sexual Activity    Alcohol use: No    Drug use: No    Sexual activity: Never   Other Topics Concern    Not on file   Social History Narrative    Not on file     Social Determinants of Health     Financial Resource Strain:     Difficulty of Paying Living Expenses:    Food Insecurity:     Worried About Running Out of Food in the Last Year:     [address] in the Last Year:    Transportation Needs:     Lack of Transportation (Medical):  Lack of Transportation (Non-Medical):    Physical Activity:     Days of Exercise per Week:     Minutes of Exercise per Session:    Stress:     Feeling of Stress :    Social Connections:     Frequency of Communication with Friends and Family:     Frequency of Social Gatherings with Friends and Family:     Attends [employer] Services:     Active Member of Clubs or Organizations:     Attends Club or Organization Meetings:     Marital Status:    Intimate Partner Violence:     Fear of Current or Ex-Partner:     Emotionally Abused:     Physically Abused:     Sexually Abused:       Family History   Problem Relation Age of Onset    Hypertension Mother     Hypertension Father     Elevated Lipids Father     Cancer Paternal Grandmother     Diabetes Maternal Grandmother       Visit Vitals  /82 (BP 1 Location: Left upper arm, BP Patient Position: Sitting)   Temp 97.5 °F (36.4 °C) (Temporal)   Resp 16   Ht 5' 8\" (1.727 m)   Wt 116.1 kg (256 lb)   LMP 10/18/2021   BMI 38.92 kg/m²        Review of Systems:   A comprehensive review of systems was negative except for that written in the HPI. Neuro Exam:     Appearance: The patient is well developed, well nourished, provides a coherent history and is in no acute distress. Mental Status: Oriented to time, place and person. Mood and affect appropriate. Cranial Nerves:   Intact visual fields. Fundi are benign on the left and slightly pale left optic nerve. Patient has a positive right APD,  EOM's full, no nystagmus, no ptosis. Facial sensation is normal. Corneal reflexes are intact. Facial movement is symmetric. Hearing is normal bilaterally. Palate is midline with normal sternocleidomastoid and trapezius muscles are normal. Tongue is midline. Motor:  5/5 strength in upper and lower proximal and distal muscles. Normal bulk and tone. No fasciculations. Reflexes:   Deep tendon reflexes 2+/4 and symmetrical.   Sensory:   Normal to touch, pinprick and vibration. Gait:  Normal gait. Tremor:   No tremor noted. Cerebellar:  No cerebellar signs present. Neurovascular:  Normal heart sounds and regular rhythm, peripheral pulses intact, and no carotid bruits. Assessment:   Multiple sclerosis. Added on Aubagio so we will do some pulmonary testing including CBC with differential BMP liver function test. She will have a tuberculin skin test done by her primary care. Understands she cannot get pregnant on the drugs and birth control will have to be in the picture without excuse. Will need 6 months of monthly liver function test and if primary care can do that fine if not I would be more than glad to take it on. Will be seeing cardiology tomorrow regarding tachycardia and the mother is welcome to ask the cardiologist whether there is any problem with starting Aubagio and her cardiac assessment? Plan:   Revisit here in about 3 months.   Signed by :  Ethel Kim MD

## 2021-11-02 NOTE — PATIENT INSTRUCTIONS
Patient has decided to go with the product Aubagio for her MS. Will need some pretreatment screening labs including CBC with differential liver function test and will need a tuberculin skin test before she starts. After treatment is initiated needs to be on birth control and will need monthly liver function test for 6 months. Would like to see her back in about 3 months. Will see cardiology tomorrow and the mother is welcome to ask the cardiologist whether he deems the drug Aubagio a contraindication to starting based on his cardiac assessment.

## 2021-11-02 NOTE — LETTER
11/2/2021 Patient: Vasiliy Louis YOB: 2000 Date of Visit: 11/2/2021 Arabella Fitzpatrick MD 
[address] [facility] [address] [address] [facility] [address] Dear Arabella Fitzpatrick MD, Thank you for referring Ms. Scarlett Man to [facility] for evaluation. My notes for this consultation are attached. If you have questions, please do not hesitate to call me. I look forward to following your patient along with you.  
 
 
Sincerely, 
 
Babs Portillo MD

## 2021-11-03 ENCOUNTER — OFFICE VISIT (OUTPATIENT)
Dept: CARDIOLOGY CLINIC | Age: 21
End: 2021-11-03
Payer: COMMERCIAL

## 2021-11-03 VITALS
SYSTOLIC BLOOD PRESSURE: 124 MMHG | BODY MASS INDEX: 39.71 KG/M2 | WEIGHT: 262 LBS | HEIGHT: 68 IN | DIASTOLIC BLOOD PRESSURE: 82 MMHG | HEART RATE: 64 BPM | OXYGEN SATURATION: 100 %

## 2021-11-03 DIAGNOSIS — I10 HYPERTENSION, UNSPECIFIED TYPE: Primary | ICD-10-CM

## 2021-11-03 PROCEDURE — 99205 OFFICE O/P NEW HI 60 MIN: CPT | Performed by: INTERNAL MEDICINE

## 2021-11-03 NOTE — PROGRESS NOTES
HISTORY OF PRESENTING ILLNESS      Albert Grossman is a 22 y.o. female with multiple sclerosis reporting chest pain noted to have PAC on recent EKG. She has experienced irregular beats but these do not affect her quality of life. She has experienced left sided chest discomfort lasting 10 minutes with radiation to her left arm, no other associated symptoms, worsened with laying flat and resolving with moving to her left. Denies syncope, edema, shortness of breath. Pain is nonexertional.        PAST MEDICAL HISTORY     Multiple sclerosis       PAST SURGICAL HISTORY     History reviewed. No pertinent surgical history. ALLERGIES     No Known Allergies       FAMILY HISTORY     Family History   Problem Relation Age of Onset    Hypertension Mother     Hypertension Father    [facility] Elevated Lipids Father     Cancer Paternal Grandmother     Diabetes Maternal Grandmother     negative for cardiac disease       SOCIAL HISTORY     Social History     Socioeconomic History    Marital status: SINGLE   Tobacco Use    Smoking status: Passive Smoke Exposure - Never Smoker    Smokeless tobacco: Never Used   Vaping Use    Vaping Use: Never used   Substance and Sexual Activity    Alcohol use: No    Drug use: No    Sexual activity: Never         MEDICATIONS     Current Outpatient Medications   Medication Sig    amLODIPine (NORVASC) 10 mg tablet Take 1 tablet by mouth once daily    hydroCHLOROthiazide (HYDRODIURIL) 12.5 mg tablet Take 1 tablet by mouth once daily     No current facility-administered medications for this visit. I have reviewed the nurses notes, vitals, problem list, allergy list, medical history, family, social history and medications. REVIEW OF SYMPTOMS      General: Pt denies excessive weight gain or loss. Pt is able to conduct ADL's  HEENT: Denies blurred vision, headaches, hearing loss, epistaxis and difficulty swallowing.   Respiratory: Denies cough, congestion, shortness of breath, ADLER, wheezing or stridor. Cardiovascular: Denies precordial pain, palpitations, edema or PND  Gastrointestinal: Denies poor appetite, indigestion, abdominal pain or blood in stool  Genitourinary: Denies hematuria, dysuria, increased urinary frequency  Musculoskeletal: Denies joint pain or swelling from muscles or joints  Neurologic: Denies tremor, paresthesias, headache, or sensory motor disturbance  Psychiatric: Denies confusion, insomnia, depression  Integumentray: Denies rash, itching or ulcers. Hematologic: Denies easy bruising, bleeding       PHYSICAL EXAMINATION      Vitals: see vitals section  General: Well developed, in no acute distress. HEENT: No jaundice, oral mucosa moist, no oral ulcers  Neck: Supple, no stiffness, no lymphadenopathy, supple  Heart:  Normal S1/S2 negative S3 or S4. Regular, no murmur, gallop or rub, no jugular venous distention  Respiratory: Clear bilaterally x 4, no wheezing or rales  Abdomen:   Soft, non-tender, bowel sounds are active. Extremities:  No edema, normal cap refill, no cyanosis. Musculoskeletal: No clubbing, no deformities  Neuro: A&Ox3, speech clear, gait stable, cooperative, no focal neurologic deficits  Skin: Skin color is normal. No rashes or lesions.  Non diaphoretic, moist.  Vascular: 2+ pulses symmetric in all extremities       DIAGNOSTIC DATA      EKG:   Visit Vitals  /82 (BP 1 Location: Left upper arm, BP Patient Position: Sitting, BP Cuff Size: Large adult)   Pulse 64   Ht 5' 8\" (1.727 m)   Wt 262 lb (118.8 kg)   SpO2 100%   BMI 39.84 kg/m²        LABORATORY DATA      Lab Results   Component Value Date/Time    WBC 5.1 10/02/2021 02:12 PM    HGB 13.2 10/02/2021 02:12 PM    Hematocrit (POC) 40 02/10/2021 04:40 PM    HCT 39.0 10/02/2021 02:12 PM    PLATELET 869 15/63/3860 02:12 PM    MCV 80.1 10/02/2021 02:12 PM      Lab Results   Component Value Date/Time    Sodium 138 10/02/2021 02:12 PM    Potassium 3.9 10/02/2021 02:12 PM    Chloride 106 10/02/2021 02:12 PM    CO2 26 10/02/2021 02:12 PM    Anion gap 6 10/02/2021 02:12 PM    Glucose 100 10/02/2021 02:12 PM    BUN 9 10/02/2021 02:12 PM    Creatinine 0.90 10/02/2021 02:12 PM    BUN/Creatinine ratio 10 (L) 10/02/2021 02:12 PM    GFR est AA >60 10/02/2021 02:12 PM    GFR est non-AA >60 10/02/2021 02:12 PM    Calcium 9.2 10/02/2021 02:12 PM    Bilirubin, total 1.1 (H) 10/02/2021 02:12 PM    Alk. phosphatase 57 10/02/2021 02:12 PM    Protein, total 8.0 10/02/2021 02:12 PM    Albumin 3.8 10/02/2021 02:12 PM    Globulin 4.2 (H) 10/02/2021 02:12 PM    A-G Ratio 0.9 (L) 10/02/2021 02:12 PM    ALT (SGPT) 27 10/02/2021 02:12 PM           ASSESSMENT      1. Hypertension  2. Dyslipidemia  3. Multiple sclerosis  4. PACs         PLAN     Suspect GERD/reflex as the  of her pain; however will obtain echocardiogram. Encouraged continued hydration; MVI. Conservative management of PACs for now. FOLLOW-UP     1 month with Missy Harper; if pain progressing, may consider stress testing      Thank you, Kenyatta Garcia MD for allowing me to participate in the care of this extraordinarily pleasant female. Please do not hesitate to contact me for further questions/concerns.          Jewel Villasenor MD  Cardiac Electrophysiology / Cardiology    [address]  [address] [place] [address], [facility], [address]  [place], [address]    [facility], [facility]  [phone] / [phone] Fax   [phone] / [phone] Fax

## 2021-11-03 NOTE — PROGRESS NOTES
Room EP4    Has MS (see neuro note) please make the medication is ok from a cardiac standpoint     Evaluation of chest pain and heart racing      Visit Vitals  /82 (BP 1 Location: Left upper arm, BP Patient Position: Sitting, BP Cuff Size: Large adult)   Pulse 64   Ht 5' 8\" (1.727 m)   Wt 262 lb (118.8 kg)   LMP 10/18/2021   SpO2 100%   BMI 39.84 kg/m²         Have you ever seen a electrophysiologist: First timer     Chest pain: no  Shortness of breath: no  Edema: no  Palpitations, Skipped beats, Rapid heartbeat: no  Dizziness: no    (Recent) New diagnosis/Surgeries: no    ER/Hospitalizations for your symptoms: [facility] ED 10-2-21 CP

## 2021-11-04 LAB
ALBUMIN SERPL-MCNC: 4.5 G/DL (ref 3.9–5)
ALP SERPL-CCNC: 55 IU/L (ref 42–106)
ALT SERPL-CCNC: 11 IU/L (ref 0–32)
AST SERPL-CCNC: 15 IU/L (ref 0–40)
BASOPHILS # BLD AUTO: 0 X10E3/UL (ref 0–0.2)
BASOPHILS NFR BLD AUTO: 1 %
BILIRUB DIRECT SERPL-MCNC: 0.24 MG/DL (ref 0–0.4)
BILIRUB SERPL-MCNC: 1 MG/DL (ref 0–1.2)
BUN SERPL-MCNC: 13 MG/DL (ref 6–20)
BUN/CREAT SERPL: 12 (ref 9–23)
CALCIUM SERPL-MCNC: 9.4 MG/DL (ref 8.7–10.2)
CHLORIDE SERPL-SCNC: 102 MMOL/L (ref 96–106)
CO2 SERPL-SCNC: 22 MMOL/L (ref 20–29)
CREAT SERPL-MCNC: 1.09 MG/DL (ref 0.57–1)
EOSINOPHIL # BLD AUTO: 0.2 X10E3/UL (ref 0–0.4)
EOSINOPHIL NFR BLD AUTO: 3 %
ERYTHROCYTE [DISTWIDTH] IN BLOOD BY AUTOMATED COUNT: 13.9 % (ref 11.7–15.4)
GLUCOSE SERPL-MCNC: 91 MG/DL (ref 65–99)
HCT VFR BLD AUTO: 39.8 % (ref 34–46.6)
HGB BLD-MCNC: 13.3 G/DL (ref 11.1–15.9)
IMM GRANULOCYTES # BLD AUTO: 0 X10E3/UL (ref 0–0.1)
IMM GRANULOCYTES NFR BLD AUTO: 0 %
LYMPHOCYTES # BLD AUTO: 2.1 X10E3/UL (ref 0.7–3.1)
LYMPHOCYTES NFR BLD AUTO: 32 %
MCH RBC QN AUTO: 27.9 PG (ref 26.6–33)
MCHC RBC AUTO-ENTMCNC: 33.4 G/DL (ref 31.5–35.7)
MCV RBC AUTO: 84 FL (ref 79–97)
MONOCYTES # BLD AUTO: 0.6 X10E3/UL (ref 0.1–0.9)
MONOCYTES NFR BLD AUTO: 9 %
NEUTROPHILS # BLD AUTO: 3.6 X10E3/UL (ref 1.4–7)
NEUTROPHILS NFR BLD AUTO: 55 %
PLATELET # BLD AUTO: 326 X10E3/UL (ref 150–450)
POTASSIUM SERPL-SCNC: 4.6 MMOL/L (ref 3.5–5.2)
PROT SERPL-MCNC: 7.5 G/DL (ref 6–8.5)
RBC # BLD AUTO: 4.76 X10E6/UL (ref 3.77–5.28)
SODIUM SERPL-SCNC: 137 MMOL/L (ref 134–144)
WBC # BLD AUTO: 6.5 X10E3/UL (ref 3.4–10.8)

## 2021-11-09 ENCOUNTER — PATIENT OUTREACH (OUTPATIENT)
Dept: CASE MANAGEMENT | Age: 21
End: 2021-11-09

## 2021-11-10 NOTE — PROGRESS NOTES
Goals Addressed                 This Visit's Progress     Attends follow up appointments on schedule        10/22/21   Educated on importance of keeping all follow up appointments   Will continue to follow up with Cardiology, Neurology and PCP   Will attend the following appointment as scheduled  o 11/2/2021 2:40 PM o Prema Venegas MD o [facility] o BS AMB o   11/3/2021 2:00 PM o Eulalia Guzman MD o [address]  will follow in 12-14 days. Pmk    11/10/21   Has followed up as scheduled with all appointments   Last follow up was on 11/3/21 with cardiology   Will follow up again in 1 month with cardiology   Will attend the following:  o 11/19/2021 4:00 PM o CELSO CAMPOS o [facility] o BS AMB o   12/20/2021 3:00 PM o Danielle Dodge NP o [facility] o BS AMB o   2/2/2022 3:40 PM o Prema Venegas MD o [address] [place] [address] will follow again in 12-14 days. pmk       Baseline activity        10/22/21   Will maintain blood pressure somewhere between 90/60 or 120/80    Will continue to take medications as prescribed    Will notify providers if not feeling good or if headaches or dizziness    Will Keep all  doctor appointments as scheduled   Educated on the Importance of Supportive resources in place to maintain patient in the community (ie. Home Health, DME equipment, refer to, medication assistant plan, [facility] etc.)   ACM contact information given. Will follow up in 10-14 days. [facility]    11/10/21    Admits to feeling well, denies chest pain or PACs during this episode   Will utilize supportive resources if needed   Continue to take medications as ordered   ACM will follow in 12-14 days.  pmk

## 2021-11-19 ENCOUNTER — ANCILLARY PROCEDURE (OUTPATIENT)
Dept: CARDIOLOGY CLINIC | Age: 21
End: 2021-11-19
Payer: COMMERCIAL

## 2021-11-19 VITALS
DIASTOLIC BLOOD PRESSURE: 82 MMHG | BODY MASS INDEX: 39.71 KG/M2 | SYSTOLIC BLOOD PRESSURE: 134 MMHG | WEIGHT: 262 LBS | HEIGHT: 68 IN

## 2021-11-19 DIAGNOSIS — I10 ESSENTIAL HYPERTENSION: ICD-10-CM

## 2021-11-19 DIAGNOSIS — I49.1 PREMATURE ATRIAL CONTRACTION: ICD-10-CM

## 2021-11-19 PROCEDURE — 93306 TTE W/DOPPLER COMPLETE: CPT | Performed by: INTERNAL MEDICINE

## 2021-11-21 LAB
ECHO AO ASC DIAM: 2.79 CM
ECHO AO ROOT DIAM: 3.01 CM
ECHO AV AREA PEAK VELOCITY: 3.76 CM2
ECHO AV AREA VTI: 3.45 CM2
ECHO AV AREA/BSA PEAK VELOCITY: 1.6 CM2/M2
ECHO AV AREA/BSA VTI: 1.5 CM2/M2
ECHO AV MEAN GRADIENT: 4.32 MMHG
ECHO AV PEAK GRADIENT: 7.31 MMHG
ECHO AV PEAK VELOCITY: 135.17 CM/S
ECHO AV VTI: 29.02 CM
ECHO EST RA PRESSURE: 3 MMHG
ECHO IVC PROX: 1.78 CM
ECHO LA AREA 4C: 21.29 CM2
ECHO LA MAJOR AXIS: 4.02 CM
ECHO LA MINOR AXIS: 1.76 CM
ECHO LA VOL 2C: 64.44 ML (ref 22–52)
ECHO LA VOL 4C: 60.59 ML (ref 22–52)
ECHO LA VOL BP: 68.94 ML (ref 22–52)
ECHO LA VOL/BSA BIPLANE: 30.1 ML/M2 (ref 16–28)
ECHO LA VOLUME INDEX A2C: 28.14 ML/M2 (ref 16–28)
ECHO LA VOLUME INDEX A4C: 26.46 ML/M2 (ref 16–28)
ECHO LV E' LATERAL VELOCITY: 12.98 CM/S
ECHO LV E' SEPTAL VELOCITY: 9.34 CM/S
ECHO LV INTERNAL DIMENSION DIASTOLIC: 5.11 CM (ref 3.9–5.3)
ECHO LV INTERNAL DIMENSION SYSTOLIC: 3.18 CM
ECHO LV IVSD: 0.85 CM (ref 0.6–0.9)
ECHO LV MASS 2D: 148.9 G (ref 67–162)
ECHO LV MASS INDEX 2D: 65 G/M2 (ref 43–95)
ECHO LV POSTERIOR WALL DIASTOLIC: 0.82 CM (ref 0.6–0.9)
ECHO LVOT DIAM: 2.31 CM
ECHO LVOT PEAK GRADIENT: 5.84 MMHG
ECHO LVOT PEAK VELOCITY: 120.78 CM/S
ECHO LVOT SV: 100.2 ML
ECHO LVOT VTI: 23.84 CM
ECHO MV A VELOCITY: 35.74 CM/S
ECHO MV E DECELERATION TIME (DT): 185.63 MS
ECHO MV E VELOCITY: 79.3 CM/S
ECHO MV E/A RATIO: 2.22
ECHO MV E/E' LATERAL: 6.11
ECHO MV E/E' RATIO (AVERAGED): 7.3
ECHO MV E/E' SEPTAL: 8.49
ECHO MV MAX VELOCITY: 74.16 CM/S
ECHO MV MEAN GRADIENT: 0.46 MMHG
ECHO MV PEAK GRADIENT: 2.2 MMHG
ECHO MV PRESSURE HALF TIME (PHT): 53.83 MS
ECHO MV VTI: 19.76 CM
ECHO RA AREA 4C: 18.78 CM2
ECHO RIGHT VENTRICULAR SYSTOLIC PRESSURE (RVSP): 20.2 MMHG
ECHO RV INTERNAL DIMENSION: 3.81 CM
ECHO RV TAPSE: 2.57 CM (ref 1.5–2)
ECHO TV REGURGITANT MAX VELOCITY: 207.35 CM/S
ECHO TV REGURGITANT PEAK GRADIENT: 17.2 MMHG

## 2021-11-23 ENCOUNTER — PATIENT OUTREACH (OUTPATIENT)
Dept: CASE MANAGEMENT | Age: 21
End: 2021-11-23

## 2021-12-02 ENCOUNTER — PATIENT OUTREACH (OUTPATIENT)
Dept: CASE MANAGEMENT | Age: 21
End: 2021-12-02

## 2021-12-02 NOTE — PROGRESS NOTES
ACM attempted to follow up with patient for CCM assessment. Attempts to reach patient were unsuccessful. On final call a VM was left for patient with the following information: ACM contact information and reason for call. Will follow up again in 7 days.

## 2021-12-03 NOTE — PROGRESS NOTES
12/03/21  Goals Addressed                 This Visit's Progress     Attends follow up appointments on schedule   On track     10/22/21   Educated on importance of keeping all follow up appointments   Will continue to follow up with Cardiology, Neurology and PCP   Will attend the following appointment as scheduled  o 11/2/2021 2:40 PM o Topher Saenz MD o [facility] o BS AMB o   11/3/2021 2:00 PM o Patrick Rachel MD o [address]  will follow in 12-14 days. Pmk    11/10/21   Has followed up as scheduled with all appointments   Last follow up was on 11/3/21 with cardiology   Will follow up again in 1 month with cardiology   Will attend the following:  o 11/19/2021 4:00 PM o CELSO CAMPOS o [facility] o BS AMB o   12/20/2021 3:00 PM o Evy Shea NP o [facility] o BS AMB o   2/2/2022 3:40 PM o Topher Saenz MD o [address] [place] [address] will follow again in 12-14 days. Pmk    12/03/21   Patient completed follow up as scheduled on 11/19/21   Waiting for test results to re-schedule follow up   Will attend Cardiology follow up on 12/20/21 pmk       Baseline activity   On track     10/22/21   Will maintain blood pressure somewhere between 90/60 or 120/80    Will continue to take medications as prescribed    Will notify providers if not feeling good or if headaches or dizziness    Will Keep all  doctor appointments as scheduled   Educated on the Importance of Supportive resources in place to maintain patient in the community (ie. Home Health, DME equipment, refer to, medication assistant plan, [facility] etc.)   ACM contact information given. Will follow up in 10-14 days.  [facility]    11/10/21    Admits to feeling well, denies chest pain or PACs during this episode   Will utilize supportive resources if needed   Continue to take medications as ordered   ACM will follow in 12-14 days. Pmk    12/03/21   Outreach completed, admits to feeling better, denies chest pain   Had follow up with cardiology   Waiting on results of sonogram   Taking medications as ordered   Will maintain blood pressure somewhere between 90/60 or 120/80 and keep log   ACM will follow up again in 12-14 days. pmk       COMPLETED: Prevent complications post hospitalization. 2/16/2021 Spoke with patient's Mom on phone. Mom on [place] authorization form. She reports patient is doing well. Some vision is returning to her right eye. She is feeling well with no pain or headaches. Has not taken BP today but will monitor and keep track of BP readings and report if elevated to PCP. They spoke with Neurology office and are waiting for them to call with appointment. Reviewed discharge summary. Next outreach planned for 2/25/2021.  PAC

## 2021-12-14 ENCOUNTER — DOCUMENTATION ONLY (OUTPATIENT)
Dept: NEUROLOGY | Age: 21
End: 2021-12-14

## 2021-12-20 ENCOUNTER — OFFICE VISIT (OUTPATIENT)
Dept: CARDIOLOGY CLINIC | Age: 21
End: 2021-12-20
Payer: COMMERCIAL

## 2021-12-20 VITALS
DIASTOLIC BLOOD PRESSURE: 80 MMHG | HEIGHT: 68 IN | RESPIRATION RATE: 14 BRPM | OXYGEN SATURATION: 99 % | HEART RATE: 64 BPM | WEIGHT: 236.4 LBS | SYSTOLIC BLOOD PRESSURE: 132 MMHG | BODY MASS INDEX: 35.83 KG/M2

## 2021-12-20 DIAGNOSIS — I49.1 PREMATURE ATRIAL CONTRACTION: ICD-10-CM

## 2021-12-20 DIAGNOSIS — I10 ESSENTIAL HYPERTENSION: Primary | ICD-10-CM

## 2021-12-20 PROCEDURE — 99214 OFFICE O/P EST MOD 30 MIN: CPT | Performed by: NURSE PRACTITIONER

## 2021-12-20 NOTE — LETTER
12/20/2021    Patient: Aniya Castaneda   YOB: 2000   Date of Visit: 12/20/2021     Jasmin Stovall, [address] [facility] [address]  [address] [employer]    Dear Jasmin Stovall MD,      Thank you for referring Ms. Migel Hernandez to [address] for evaluation. My notes for this consultation are attached. If you have questions, please do not hesitate to call me. I look forward to following your patient along with you.       Sincerely,    Romulo Singleton NP

## 2021-12-20 NOTE — PROGRESS NOTES
HISTORY OF PRESENTING ILLNESS      Karan Blizzard is a 22 y.o. female with multiple sclerosis reporting chest pain noted to have PAC on recent EKG. She has experienced irregular beats but these do not affect her quality of life. She has experienced left sided chest discomfort lasting 10 minutes with radiation to her left arm, no other associated symptoms, worsened with laying flat and resolving with moving to her left. Denies syncope, edema, shortness of breath. Pain is nonexertional.   Suspected  GERD/reflex as the  of her pain and encouraged continued hydration; MVI. Her echocardiogram showed preserved LV function. She is doing well overall, experiencing \"electric shock\" like symptoms associated with her MS. PAST MEDICAL HISTORY     Multiple sclerosis       PAST SURGICAL HISTORY     No past surgical history on file. ALLERGIES     No Known Allergies       FAMILY HISTORY     Family History   Problem Relation Age of Onset    Hypertension Mother     Hypertension Father     Elevated Lipids Father     Cancer Paternal Grandmother     Diabetes Maternal Grandmother     negative for cardiac disease       SOCIAL HISTORY     Social History     Socioeconomic History    Marital status: SINGLE   Tobacco Use    Smoking status: Passive Smoke Exposure - Never Smoker    Smokeless tobacco: Never Used   Vaping Use    Vaping Use: Never used   Substance and Sexual Activity    Alcohol use: No    Drug use: No    Sexual activity: Never         MEDICATIONS     Current Outpatient Medications   Medication Sig    teriflunomide (Aubagio) 14 mg tablet Take 14 mg by mouth daily.  amLODIPine (NORVASC) 10 mg tablet Take 1 tablet by mouth once daily    hydroCHLOROthiazide (HYDRODIURIL) 12.5 mg tablet Take 1 tablet by mouth once daily     No current facility-administered medications for this visit.        I have reviewed the nurses notes, vitals, problem list, allergy list, medical history, family, social history and medications. REVIEW OF SYMPTOMS      General: Pt denies excessive weight gain or loss. Pt is able to conduct ADL's  HEENT: Denies blurred vision, headaches, hearing loss, epistaxis and difficulty swallowing. Respiratory: Denies cough, congestion, shortness of breath, ADLER, wheezing or stridor. Cardiovascular: Denies precordial pain, palpitations, edema or PND  Gastrointestinal: Denies poor appetite, indigestion, abdominal pain or blood in stool  Genitourinary: Denies hematuria, dysuria, increased urinary frequency  Musculoskeletal: Denies joint pain or swelling from muscles or joints  Neurologic: Denies tremor, paresthesias, headache, or sensory motor disturbance  Psychiatric: Denies confusion, insomnia, depression  Integumentray: Denies rash, itching or ulcers. Hematologic: Denies easy bruising, bleeding       PHYSICAL EXAMINATION      Vitals: see vitals section  General: Well developed, in no acute distress. HEENT: No jaundice, oral mucosa moist, no oral ulcers  Neck: Supple, no stiffness, no lymphadenopathy, supple  Heart:  Normal S1/S2 negative S3 or S4. Regular, no murmur, gallop or rub, no jugular venous distention  Respiratory: Clear bilaterally x 4, no wheezing or rales  Abdomen:   Soft, non-tender, bowel sounds are active. Extremities:  No edema, normal cap refill, no cyanosis. Musculoskeletal: No clubbing, no deformities  Neuro: A&Ox3, speech clear, gait stable, cooperative, no focal neurologic deficits  Skin: Skin color is normal. No rashes or lesions. Non diaphoretic, moist.  Vascular: 2+ pulses symmetric in all extremities       DIAGNOSTIC DATA      EKG:   There were no vitals taken for this visit.      LABORATORY DATA      Lab Results   Component Value Date/Time    WBC 6.5 11/03/2021 03:10 PM    HGB 13.3 11/03/2021 03:10 PM    Hematocrit (POC) 40 02/10/2021 04:40 PM    HCT 39.8 11/03/2021 03:10 PM    PLATELET 625 80/87/6830 03:10 PM    MCV 84 11/03/2021 03:10 PM      Lab Results Component Value Date/Time    Sodium 137 11/03/2021 03:10 PM    Potassium 4.6 11/03/2021 03:10 PM    Chloride 102 11/03/2021 03:10 PM    CO2 22 11/03/2021 03:10 PM    Anion gap 6 10/02/2021 02:12 PM    Glucose 91 11/03/2021 03:10 PM    BUN 13 11/03/2021 03:10 PM    Creatinine 1.09 (H) 11/03/2021 03:10 PM    BUN/Creatinine ratio 12 11/03/2021 03:10 PM    GFR est AA 84 11/03/2021 03:10 PM    GFR est non-AA 73 11/03/2021 03:10 PM    Calcium 9.4 11/03/2021 03:10 PM    Bilirubin, total 1.0 11/03/2021 03:10 PM    Alk. phosphatase 55 11/03/2021 03:10 PM    Protein, total 7.5 11/03/2021 03:10 PM    Albumin 4.5 11/03/2021 03:10 PM    Globulin 4.2 (H) 10/02/2021 02:12 PM    A-G Ratio 0.9 (L) 10/02/2021 02:12 PM    ALT (SGPT) 11 11/03/2021 03:10 PM           ASSESSMENT      1. Hypertension  2. Dyslipidemia  3. Multiple sclerosis  4. PACs         PLAN     We reviewed her echocardiogram results today and talked about her MS diagnosis, she is struggling with the mental stress of it. She continues to hydrate and will continue plan of conservative treatment for PACs. If she has progression of symptoms, will obtain monitor and stress testing to evaluate. FOLLOW-UP     PRN      Thank you, Ananya vAila MD for allowing me to participate in the care of this extraordinarily pleasant female. Please do not hesitate to contact me for further questions/concerns.        MELANIE Parikh [facility] [address].  [address] [facility], [facility], [address] [place] [address], [facility]  [phone] / [phone] Fax   [phone] / [phone] Fax

## 2021-12-20 NOTE — PROGRESS NOTES
Room EP 2  Visit Vitals  /80 (BP 1 Location: Left upper arm, BP Patient Position: Sitting)   Pulse 64   Resp 14   Ht 5' 8\" (1.727 m)   Wt 236 lb 6.4 oz (107.2 kg)   SpO2 99%   BMI 35.94 kg/m²       \"Electric shocks\" throughout whole body   Chest pain: no  Shortness of breath: no  Edema: no  Palpitations, Skipped beats, Rapid heartbeat: fast heart beats with electric shock sensations  Dizziness: no  Fatigue:no    New diagnosis/Surgeries: no    ER/Hospitalizations: no    Refills:no

## 2021-12-21 ENCOUNTER — PATIENT OUTREACH (OUTPATIENT)
Dept: CASE MANAGEMENT | Age: 21
End: 2021-12-21

## 2021-12-22 RX ORDER — AMLODIPINE BESYLATE 10 MG/1
TABLET ORAL
Qty: 30 TABLET | Refills: 0 | Status: SHIPPED | OUTPATIENT
Start: 2021-12-22 | End: 2022-01-20

## 2021-12-22 RX ORDER — HYDROCHLOROTHIAZIDE 12.5 MG/1
TABLET ORAL
Qty: 30 TABLET | Refills: 0 | Status: SHIPPED | OUTPATIENT
Start: 2021-12-22 | End: 2022-01-20

## 2021-12-23 NOTE — PROGRESS NOTES
12/22/21  Goals      Attends follow up appointments on schedule      10/22/21   Educated on importance of keeping all follow up appointments   Will continue to follow up with Cardiology, Neurology and PCP   Will attend the following appointment as scheduled  o 11/2/2021 2:40 PM o Jamie Martin MD o [address] [facility] o BS AMB o   11/3/2021 2:00 PM o Michel Lala MD o [address] [facility] will follow in 12-14 days. Pmk    11/10/21   Has followed up as scheduled with all appointments   Last follow up was on 11/3/21 with cardiology   Will follow up again in 1 month with cardiology   Will attend the following:  o 11/19/2021 4:00 PM o CELSO CAMPOS o [facility] o BS AMB o   12/20/2021 3:00 PM o Osmin Buchanan NP o [facility] o BS AMB o   2/2/2022 3:40 PM o Jamie Martin MD o [address] [facility] will follow again in 12-14 days. Pmk    12/03/21   Patient completed follow up as scheduled on 11/19/21   Waiting for test results to re-schedule follow up   Will attend Cardiology follow up on 12/20/21 pmk    12/22/21   Attended follow up with cardiology on 12/20/21   Reviewed all up coming scheduled visits   Will attend next scheduled cardiology and neurology visits as scheduled.  ACM will follow up again  in 12-15 days. [address] [place] [address]       Baseline activity      10/22/21   Will maintain blood pressure somewhere between 90/60 or 120/80    Will continue to take medications as prescribed    Will notify providers if not feeling good or if headaches or dizziness    Will Keep all  doctor appointments as scheduled   Educated on the Importance of Supportive resources in place to maintain patient in the community (ie. Home Health, DME equipment, refer to, medication assistant plan, [facility] etc.)   ACM contact information given. Will follow up in 10-14 days.  [address] [place] [address]    11/10/21  Admits to feeling well, denies chest pain or PACs during this episode   Will utilize supportive resources if needed   Continue to take medications as ordered   ACM will follow in 12-14 days. Pmk    12/03/21   Outreach completed, admits to feeling better, denies chest pain   Had follow up with cardiology   Waiting on results of sonogram   Taking medications as ordered   Will maintain blood pressure somewhere between 90/60 or 120/80 and keep log   ACM will follow up again in 12-14 days. Pmk    12/22/21   Outreach completed, admits to doing better   Results of Sonogram discussed with patient by provider   Continue to have episodes of chest pain and PACs associated with her MS diagnosis   Continue to verbalize stress related to diagnosis   Will continue to utilie conventional treatment for relief of PACs and chest discomfort   Will observe closely changes in condition and notify provider accordingly   ACM will follow up again in 12-14 days.  pmk  

## 2021-12-27 ENCOUNTER — APPOINTMENT (OUTPATIENT)
Dept: GENERAL RADIOLOGY | Age: 21
End: 2021-12-27
Attending: EMERGENCY MEDICINE
Payer: COMMERCIAL

## 2021-12-27 ENCOUNTER — HOSPITAL ENCOUNTER (EMERGENCY)
Age: 21
Discharge: HOME | End: 2021-12-28
Attending: EMERGENCY MEDICINE
Payer: COMMERCIAL

## 2021-12-27 ENCOUNTER — OFFICE VISIT (OUTPATIENT)
Dept: FAMILY MEDICINE CLINIC | Age: 21
End: 2021-12-27
Payer: COMMERCIAL

## 2021-12-27 VITALS
HEIGHT: 68 IN | WEIGHT: 230 LBS | TEMPERATURE: 98.2 F | SYSTOLIC BLOOD PRESSURE: 116 MMHG | DIASTOLIC BLOOD PRESSURE: 78 MMHG | BODY MASS INDEX: 34.86 KG/M2 | OXYGEN SATURATION: 100 % | RESPIRATION RATE: 18 BRPM | HEART RATE: 72 BPM

## 2021-12-27 DIAGNOSIS — N92.0 MENORRHAGIA WITH REGULAR CYCLE: ICD-10-CM

## 2021-12-27 DIAGNOSIS — K59.00 CONSTIPATION, UNSPECIFIED CONSTIPATION TYPE: ICD-10-CM

## 2021-12-27 DIAGNOSIS — N94.6 DYSMENORRHEA: Primary | ICD-10-CM

## 2021-12-27 DIAGNOSIS — K59.01 SLOW TRANSIT CONSTIPATION: ICD-10-CM

## 2021-12-27 DIAGNOSIS — R10.84 ABDOMINAL PAIN, GENERALIZED: Primary | ICD-10-CM

## 2021-12-27 DIAGNOSIS — R10.9 FLANK PAIN: ICD-10-CM

## 2021-12-27 DIAGNOSIS — R82.4 KETONURIA: ICD-10-CM

## 2021-12-27 LAB
ALBUMIN SERPL-MCNC: 3.8 G/DL (ref 3.5–5)
ALBUMIN/GLOB SERPL: 0.8 {RATIO} (ref 1.1–2.2)
ALP SERPL-CCNC: 54 U/L (ref 45–117)
ALT SERPL-CCNC: 17 U/L (ref 12–78)
ANION GAP SERPL CALC-SCNC: 7 MMOL/L (ref 5–15)
APPEARANCE UR: CLEAR
AST SERPL-CCNC: 20 U/L (ref 15–37)
BACTERIA URNS QL MICRO: NEGATIVE /HPF
BASOPHILS # BLD: 0.1 K/UL (ref 0–0.1)
BASOPHILS NFR BLD: 1 % (ref 0–1)
BILIRUB SERPL-MCNC: 1.5 MG/DL (ref 0.2–1)
BILIRUB UR QL STRIP: NEGATIVE
BILIRUB UR QL: NEGATIVE
BUN SERPL-MCNC: 13 MG/DL (ref 6–20)
BUN/CREAT SERPL: 15 (ref 12–20)
CALCIUM SERPL-MCNC: 9.3 MG/DL (ref 8.5–10.1)
CHLORIDE SERPL-SCNC: 104 MMOL/L (ref 97–108)
CO2 SERPL-SCNC: 26 MMOL/L (ref 21–32)
COLOR UR: ABNORMAL
COMMENT, HOLDF: NORMAL
CREAT SERPL-MCNC: 0.84 MG/DL (ref 0.55–1.02)
DIFFERENTIAL METHOD BLD: NORMAL
EOSINOPHIL # BLD: 0.2 K/UL (ref 0–0.4)
EOSINOPHIL NFR BLD: 2 % (ref 0–7)
EPITH CASTS URNS QL MICRO: ABNORMAL /LPF
ERYTHROCYTE [DISTWIDTH] IN BLOOD BY AUTOMATED COUNT: 12.5 % (ref 11.5–14.5)
GLOBULIN SER CALC-MCNC: 4.5 G/DL (ref 2–4)
GLUCOSE SERPL-MCNC: 89 MG/DL (ref 65–100)
GLUCOSE UR STRIP.AUTO-MCNC: NEGATIVE MG/DL
GLUCOSE UR-MCNC: NEGATIVE MG/DL
HBA1C MFR BLD HPLC: 5 %
HCG UR QL: NEGATIVE
HCG URINE, QL. (POC): NEGATIVE
HCT VFR BLD AUTO: 38.8 % (ref 35–47)
HGB BLD-MCNC: 13.3 G/DL (ref 11.5–16)
HGB UR QL STRIP: ABNORMAL
HYALINE CASTS URNS QL MICRO: ABNORMAL /LPF (ref 0–5)
IMM GRANULOCYTES # BLD AUTO: 0 K/UL (ref 0–0.04)
IMM GRANULOCYTES NFR BLD AUTO: 0 % (ref 0–0.5)
KETONES P FAST UR STRIP-MCNC: NORMAL MG/DL
KETONES UR QL STRIP.AUTO: ABNORMAL MG/DL
LEUKOCYTE ESTERASE UR QL STRIP.AUTO: ABNORMAL
LIPASE SERPL-CCNC: 87 U/L (ref 73–393)
LYMPHOCYTES # BLD: 1.7 K/UL (ref 0.8–3.5)
LYMPHOCYTES NFR BLD: 25 % (ref 12–49)
MCH RBC QN AUTO: 27.9 PG (ref 26–34)
MCHC RBC AUTO-ENTMCNC: 34.3 G/DL (ref 30–36.5)
MCV RBC AUTO: 81.5 FL (ref 80–99)
MONOCYTES # BLD: 0.6 K/UL (ref 0–1)
MONOCYTES NFR BLD: 9 % (ref 5–13)
NEUTS SEG # BLD: 4.4 K/UL (ref 1.8–8)
NEUTS SEG NFR BLD: 63 % (ref 32–75)
NITRITE UR QL STRIP.AUTO: NEGATIVE
NRBC # BLD: 0 K/UL (ref 0–0.01)
NRBC BLD-RTO: 0 PER 100 WBC
PH UR STRIP: 5.5 [PH] (ref 4.6–8)
PH UR STRIP: 6 [PH] (ref 5–8)
PLATELET # BLD AUTO: 312 K/UL (ref 150–400)
PMV BLD AUTO: 9.8 FL (ref 8.9–12.9)
POTASSIUM SERPL-SCNC: 3.5 MMOL/L (ref 3.5–5.1)
PROT SERPL-MCNC: 8.3 G/DL (ref 6.4–8.2)
PROT UR QL STRIP: NEGATIVE
PROT UR STRIP-MCNC: NEGATIVE MG/DL
RBC # BLD AUTO: 4.76 M/UL (ref 3.8–5.2)
RBC #/AREA URNS HPF: ABNORMAL /HPF (ref 0–5)
SAMPLES BEING HELD,HOLD: NORMAL
SODIUM SERPL-SCNC: 137 MMOL/L (ref 136–145)
SP GR UR REFRACTOMETRY: <1.005 (ref 1–1.03)
SP GR UR STRIP: 1.01 (ref 1–1.03)
UA UROBILINOGEN AMB POC: NORMAL (ref 0.2–1)
UA: UC IF INDICATED,UAUC: ABNORMAL
URINALYSIS CLARITY POC: CLEAR
URINALYSIS COLOR POC: YELLOW
URINE BLOOD POC: NORMAL
URINE LEUKOCYTES POC: NEGATIVE
URINE NITRITES POC: NEGATIVE
UROBILINOGEN UR QL STRIP.AUTO: 0.2 EU/DL (ref 0.2–1)
VALID INTERNAL CONTROL?: YES
WBC # BLD AUTO: 6.9 K/UL (ref 3.6–11)
WBC URNS QL MICRO: ABNORMAL /HPF (ref 0–4)

## 2021-12-27 PROCEDURE — 85025 COMPLETE CBC W/AUTO DIFF WBC: CPT

## 2021-12-27 PROCEDURE — 83690 ASSAY OF LIPASE: CPT

## 2021-12-27 PROCEDURE — 84484 ASSAY OF TROPONIN QUANT: CPT

## 2021-12-27 PROCEDURE — 81025 URINE PREGNANCY TEST: CPT | Performed by: NURSE PRACTITIONER

## 2021-12-27 PROCEDURE — 81003 URINALYSIS AUTO W/O SCOPE: CPT | Performed by: NURSE PRACTITIONER

## 2021-12-27 PROCEDURE — 99284 EMERGENCY DEPT VISIT MOD MDM: CPT

## 2021-12-27 PROCEDURE — 83036 HEMOGLOBIN GLYCOSYLATED A1C: CPT | Performed by: NURSE PRACTITIONER

## 2021-12-27 PROCEDURE — 74018 RADEX ABDOMEN 1 VIEW: CPT

## 2021-12-27 PROCEDURE — 93005 ELECTROCARDIOGRAM TRACING: CPT

## 2021-12-27 PROCEDURE — 80053 COMPREHEN METABOLIC PANEL: CPT

## 2021-12-27 PROCEDURE — 81001 URINALYSIS AUTO W/SCOPE: CPT

## 2021-12-27 PROCEDURE — 36415 COLL VENOUS BLD VENIPUNCTURE: CPT

## 2021-12-27 PROCEDURE — 81025 URINE PREGNANCY TEST: CPT

## 2021-12-27 PROCEDURE — 99214 OFFICE O/P EST MOD 30 MIN: CPT | Performed by: NURSE PRACTITIONER

## 2021-12-27 PROCEDURE — 85379 FIBRIN DEGRADATION QUANT: CPT

## 2021-12-27 NOTE — PROGRESS NOTES
Chief Complaint   Patient presents with    Multiple Sclerosis    UTI     Patient in office today for new dx of MS. Pt was advised to consult with pcp for ocp options before start of treatment. Pt notes menses is every 28 to 30 days. Last for 7 days, heavy flow then tapers to medium. Denies clots. Pt does note breast tenderness, back pain, abdominal cramps and HA with menses. Will treat PMS sx with Midol with no relief noted. LMP: 11/30/21. Last intercourse: Denies being sexually active. Any possibility of STI? Denies. Vaginal or urinary sx: Denies. See below. Has patient previously tried [facility] before: yes was on the pill. Occasionally forgot to take. Current smoker? Denies. Any history of blood clots in legs or lungs? Denies. Any family history of blood clots? Denies. History of migraine HAs? Does get migraines. Have c/o of poss uti that began yesterday. Pt notes left side abdominal pain that radiates to flank. Last BM was a few days. On average goes once a week. Denies being hard or having to straine. Pt denies urinary odor or retention. Does note urinary frequency-water intake is high. Results for orders placed or performed in visit on 12/27/21   AMB POC URINALYSIS DIP STICK AUTO W/O MICRO   Result Value Ref Range    Color (UA POC) Yellow     Clarity (UA POC) Clear     Glucose (UA POC) Negative Negative    Bilirubin (UA POC) Negative Negative    Ketones (UA POC) Trace Negative    Specific gravity (UA POC) 1.015 1.001 - 1.035    Blood (UA POC) Trace Negative    pH (UA POC) 5.5 4.6 - 8.0    Protein (UA POC) Negative Negative    Urobilinogen (UA POC) 0.2 mg/dL 0.2 - 1    Nitrites (UA POC) Negative Negative    Leukocyte esterase (UA POC) Negative Negative   AMB POC URINE PREGNANCY TEST, VISUAL COLOR COMPARISON   Result Value Ref Range    VALID INTERNAL CONTROL POC Yes     HCG urine, Ql. (POC) Negative Negative     Denies any other concerns at this time.      Chief Complaint   Patient presents with    Multiple Sclerosis    UTI     she is a 22y.o. year old female who presents for evalution. Reviewed PmHx, RxHx, FmHx, SocHx, AllgHx and updated and dated in the chart. Review of Systems - negative except as listed above in the HPI    Objective:     Vitals:    12/27/21 1553   BP: 116/78   Pulse: 72   Resp: 18   Temp: 98.2 °F (36.8 °C)   TempSrc: Oral   SpO2: 100%   Weight: 230 lb (104.3 kg)   Height: 5' 8\" (1.727 m)     Physical Examination: General appearance - alert, well appearing, and in no distress  Mental status - flat affect  Eyes - pupils equal and reactive, extraocular eye movements intact  Ears - bilateral TM's and external ear canals normal  Nose - normal and patent, no erythema, discharge or polyps  Mouth - mucous membranes moist, pharynx normal without lesions  Neck - supple, no significant adenopathy  Chest - clear to auscultation, no wheezes, rales or rhonchi, symmetric air entry  Heart - normal rate, regular rhythm, normal S1, S2, no murmurs  Abdomen - soft, slight tenderness in the LLQ with deep palpation, nondistended, no masses or organomegaly  bowel sounds hypoactive    Assessment/ Plan:   Diagnoses and all orders for this visit:    1. Dysmenorrhea / 4. Menorrhagia with regular cycle  -     norethindrone-e estradiol-iron (LOESTRIN FE) 1 mg-20 mcg (24)/75 mg (4) tab; Take 1 Tablet by mouth daily.  -     AMB POC URINE PREGNANCY TEST, VISUAL COLOR COMPARISON  Neg UPT. Resume loestrin fe daily. Has previously tolerated without SEs. Reviewed missed pill instructions, ACHES, and other SEs of medication. Consider nuvaring if difficulty with compliance. Thinks she can have more perfect use now that she will be on daily med for management of her MS.   2. Flank pain  -     AMB POC URINALYSIS DIP STICK AUTO W/O MICRO  No evidence of infection on UA.    3. Ketonuria  -     AMB POC HEMOGLOBIN A1C  Normal hemoglobin a1c.   5. Constipation, unspecified constipation type  Recommended pt try miralax once daily to address constipation. Discussed that constipation could be contributing to her urinary frequency and flank pain. Reviewed other supportive measures. Follow up if sx persist or worsen. Follow-up and Dispositions    · Return if symptoms worsen or fail to improve. I have discussed the diagnosis with the patient and the intended plan as seen in the above orders. The patient has received an after-visit summary and questions were answered concerning future plans. Medication Side Effects and Warnings were discussed with patient: yes  Patient Labs were reviewed and or requested: yes  Patient Past Records were reviewed and or requested  yes  Patient / Caregiver Understanding of treatment plan was verbalized during office visit Blase Bernheim, FNP-C    Patient Instructions   There are some minor side effects you may experience when starting your birth control. These include: weight gain, lighter periods, mood changes, nausea, and sore or swollen breasts. Remember the pneumonic ACHES which identifies some of the less common but more serious side effects of birth control. A - Abdominal pain  C - Chest pain  H - Headache  E - Eye problems (blurred vision)  S - Swelling and or pain in the leg  If you experience any of these side effects after starting your birth control, please call your provider ASAP.      Missed pill instructions for oral contraceptives:  - Missed one pill: either take the pill right as you remember if it is on the same day or take two pills the next day  - Missed two pills: take two pills on the day you remember and take two pills the next day; then take one pill a day until you finish the pack; use protection for the next 7 days if you have intercourse to decrease the risk of pregnancy  - Missed three pills: throw that pill pack away and either wait for withdrawal bleed or start a new pack; use protection for the next 7 days if you have intercourse to decrease the risk of pregnancy

## 2021-12-27 NOTE — PATIENT INSTRUCTIONS
There are some minor side effects you may experience when starting your birth control. These include: weight gain, lighter periods, mood changes, nausea, and sore or swollen breasts. Remember the pneumonic ACHES which identifies some of the less common but more serious side effects of birth control. A - Abdominal pain  C - Chest pain  H - Headache  E - Eye problems (blurred vision)  S - Swelling and or pain in the leg  If you experience any of these side effects after starting your birth control, please call your provider ASAP.      Missed pill instructions for oral contraceptives:  - Missed one pill: either take the pill right as you remember if it is on the same day or take two pills the next day  - Missed two pills: take two pills on the day you remember and take two pills the next day; then take one pill a day until you finish the pack; use protection for the next 7 days if you have intercourse to decrease the risk of pregnancy  - Missed three pills: throw that pill pack away and either wait for withdrawal bleed or start a new pack; use protection for the next 7 days if you have intercourse to decrease the risk of pregnancy

## 2021-12-27 NOTE — PROGRESS NOTES
Chief Complaint   Patient presents with    Multiple Sclerosis    UTI     Patient in office today for new dx of MS. Pt was advised to consult with pcp for ocp options before start of treatment. Pt notes menses is every 28 to 30 days. Last for 7 days,heavy flow then taper to medium. Denies clots. Pt does note breast tenderness,back pain,abdominal cramps,and HA with menses. Will treat PMS sx with Midol with no relief noted. Have c/o of poss uti that began yesterday. Pt notes left side abdominal pain that radiates to flank. Pt denies urinary odor or retention. Does note urinary frequency-water intake is high. 1. Have you been to the ER, urgent care clinic since your last visit? Hospitalized since your last visit? No    2. Have you seen or consulted any other health care providers outside of the [address] [place] [address] since your last visit? Include any pap smears or colon screening.  No

## 2021-12-28 ENCOUNTER — APPOINTMENT (OUTPATIENT)
Dept: CT IMAGING | Age: 21
End: 2021-12-28
Attending: EMERGENCY MEDICINE
Payer: COMMERCIAL

## 2021-12-28 VITALS
HEART RATE: 74 BPM | DIASTOLIC BLOOD PRESSURE: 84 MMHG | TEMPERATURE: 97.4 F | RESPIRATION RATE: 17 BRPM | WEIGHT: 230 LBS | BODY MASS INDEX: 34.97 KG/M2 | OXYGEN SATURATION: 99 % | SYSTOLIC BLOOD PRESSURE: 116 MMHG

## 2021-12-28 LAB
ATRIAL RATE: 66 BPM
CALCULATED P AXIS, ECG09: 18 DEGREES
CALCULATED R AXIS, ECG10: -4 DEGREES
CALCULATED T AXIS, ECG11: 35 DEGREES
D DIMER PPP FEU-MCNC: 0.2 MG/L FEU (ref 0–0.65)
DIAGNOSIS, 93000: NORMAL
P-R INTERVAL, ECG05: 146 MS
Q-T INTERVAL, ECG07: 418 MS
QRS DURATION, ECG06: 96 MS
QTC CALCULATION (BEZET), ECG08: 438 MS
TROPONIN-HIGH SENSITIVITY: 5 NG/L (ref 0–51)
VENTRICULAR RATE, ECG03: 66 BPM

## 2021-12-28 PROCEDURE — 74176 CT ABD & PELVIS W/O CONTRAST: CPT

## 2021-12-28 RX ORDER — POLYETHYLENE GLYCOL 3350 17 G/17G
17 POWDER, FOR SOLUTION ORAL DAILY
Qty: 85 G | Refills: 0 | Status: SHIPPED | OUTPATIENT
Start: 2021-12-28 | End: 2022-01-02

## 2021-12-28 NOTE — DISCHARGE INSTRUCTIONS
We hope that we have addressed all of your medical concerns. The examination and treatment you received in the Emergency Department were for an emergent problem and were not intended as complete care. It is important that you follow up with your healthcare provider(s) for ongoing care. If your symptoms worsen or do not improve as expected, and you are unable to reach your usual health care provider(s), you should return to the Emergency Department. Today's healthcare is undergoing tremendous change, and patient satisfaction surveys are one of the many tools to assess the quality of medical care. You may receive a survey from the [employer] organization regarding your experience in the Emergency Department. I hope that your experience has been completely positive, particularly the medical care that I provided. As such, please participate in the survey; anything less than excellent does not meet my expectations or intentions. Formerly [facility] and [facility] participate in nationally recognized quality of care measures. If your blood pressure is greater than 120/80, as reported below, we urge that you seek medical care to address the potential of high blood pressure, commonly known as hypertension. Hypertension can be hereditary or can be caused by certain medical conditions, pain, stress, or \"white coat syndrome. \"       Please make an appointment with your health care provider(s) for follow up of your Emergency Department visit. VITALS:   Patient Vitals for the past 8 hrs:   Temp Pulse Resp BP SpO2   12/27/21 2222 97.4 °F (36.3 °C) 73 16 116/70 100 %   12/27/21 2006 97 °F (36.1 °C) 76 17 (!) 144/69 100 %          Thank you for allowing us to provide you with medical care today. We realize that you have many choices for your emergency care needs. Please choose us in the future for any continued health care needs. Murray Alfredo Wilfrid Barlow, [address] [place], [place] [address]y 20.   Office: [phone]            Recent Results (from the past 24 hour(s))   AMB POC URINALYSIS DIP STICK AUTO W/O MICRO    Collection Time: 12/27/21  4:09 PM   Result Value Ref Range    Color (UA POC) Yellow     Clarity (UA POC) Clear     Glucose (UA POC) Negative Negative    Bilirubin (UA POC) Negative Negative    Ketones (UA POC) Trace Negative    Specific gravity (UA POC) 1.015 1.001 - 1.035    Blood (UA POC) Trace Negative    pH (UA POC) 5.5 4.6 - 8.0    Protein (UA POC) Negative Negative    Urobilinogen (UA POC) 0.2 mg/dL 0.2 - 1    Nitrites (UA POC) Negative Negative    Leukocyte esterase (UA POC) Negative Negative   AMB POC URINE PREGNANCY TEST, VISUAL COLOR COMPARISON    Collection Time: 12/27/21  4:09 PM   Result Value Ref Range    VALID INTERNAL CONTROL POC Yes     HCG urine, Ql. (POC) Negative Negative   AMB POC HEMOGLOBIN A1C    Collection Time: 12/27/21  4:19 PM   Result Value Ref Range    Hemoglobin A1c (POC) 5.0 %   URINALYSIS W/ REFLEX CULTURE    Collection Time: 12/27/21  8:32 PM    Specimen: Urine   Result Value Ref Range    Color YELLOW/STRAW      Appearance CLEAR CLEAR      Specific gravity <1.005 1.003 - 1.030    pH (UA) 6.0 5.0 - 8.0      Protein Negative NEG mg/dL    Glucose Negative NEG mg/dL    Ketone TRACE (A) NEG mg/dL    Bilirubin Negative NEG      Blood TRACE (A) NEG      Urobilinogen 0.2 0.2 - 1.0 EU/dL    Nitrites Negative NEG      Leukocyte Esterase SMALL (A) NEG      WBC 5-10 0 - 4 /hpf    RBC 0-5 0 - 5 /hpf    Epithelial cells FEW FEW /lpf    Bacteria Negative NEG /hpf    UA:UC IF INDICATED CULTURE NOT INDICATED BY UA RESULT CNI      Hyaline cast 0-2 0 - 5 /lpf   HCG URINE, QL. - POC    Collection Time: 12/27/21  8:37 PM   Result Value Ref Range    Pregnancy test,urine (POC) Negative NEG     CBC WITH AUTOMATED DIFF    Collection Time: 12/27/21  8:59 PM   Result Value Ref Range    WBC 6.9 3.6 - 11.0 K/uL    RBC 4.76 3.80 - 5.20 M/uL HGB 13.3 11.5 - 16.0 g/dL    HCT 38.8 35.0 - 47.0 %    MCV 81.5 80.0 - 99.0 FL    MCH 27.9 26.0 - 34.0 PG    MCHC 34.3 30.0 - 36.5 g/dL    RDW 12.5 11.5 - 14.5 %    PLATELET 871 011 - 935 K/uL    MPV 9.8 8.9 - 12.9 FL    NRBC 0.0 0  WBC    ABSOLUTE NRBC 0.00 0.00 - 0.01 K/uL    NEUTROPHILS 63 32 - 75 %    LYMPHOCYTES 25 12 - 49 %    MONOCYTES 9 5 - 13 %    EOSINOPHILS 2 0 - 7 %    BASOPHILS 1 0 - 1 %    IMMATURE GRANULOCYTES 0 0.0 - 0.5 %    ABS. NEUTROPHILS 4.4 1.8 - 8.0 K/UL    ABS. LYMPHOCYTES 1.7 0.8 - 3.5 K/UL    ABS. MONOCYTES 0.6 0.0 - 1.0 K/UL    ABS. EOSINOPHILS 0.2 0.0 - 0.4 K/UL    ABS. BASOPHILS 0.1 0.0 - 0.1 K/UL    ABS. IMM. GRANS. 0.0 0.00 - 0.04 K/UL    DF AUTOMATED     METABOLIC PANEL, COMPREHENSIVE    Collection Time: 12/27/21  8:59 PM   Result Value Ref Range    Sodium 137 136 - 145 mmol/L    Potassium 3.5 3.5 - 5.1 mmol/L    Chloride 104 97 - 108 mmol/L    CO2 26 21 - 32 mmol/L    Anion gap 7 5 - 15 mmol/L    Glucose 89 65 - 100 mg/dL    BUN 13 6 - 20 MG/DL    Creatinine 0.84 0.55 - 1.02 MG/DL    BUN/Creatinine ratio 15 12 - 20      GFR est AA >60 >60 ml/min/1.73m2    GFR est non-AA >60 >60 ml/min/1.73m2    Calcium 9.3 8.5 - 10.1 MG/DL    Bilirubin, total 1.5 (H) 0.2 - 1.0 MG/DL    ALT (SGPT) 17 12 - 78 U/L    AST (SGOT) 20 15 - 37 U/L    Alk. phosphatase 54 45 - 117 U/L    Protein, total 8.3 (H) 6.4 - 8.2 g/dL    Albumin 3.8 3.5 - 5.0 g/dL    Globulin 4.5 (H) 2.0 - 4.0 g/dL    A-G Ratio 0.8 (L) 1.1 - 2.2     LIPASE    Collection Time: 12/27/21  8:59 PM   Result Value Ref Range    Lipase 87 73 - 393 U/L   SAMPLES BEING HELD    Collection Time: 12/27/21  8:59 PM   Result Value Ref Range    SAMPLES BEING HELD [email or web address]     COMMENT        Add-on orders for these samples will be processed based on acceptable specimen integrity and analyte stability, which may vary by analyte.    EKG, 12 LEAD, INITIAL    Collection Time: 12/27/21 11:41 PM   Result Value Ref Range    Ventricular Rate 66 BPM    Atrial Rate 66 BPM    P-R Interval 146 ms    QRS Duration 96 ms    Q-T Interval 418 ms    QTC Calculation (Bezet) 438 ms    Calculated P Axis 18 degrees    Calculated R Axis -4 degrees    Calculated T Axis 35 degrees    Diagnosis       Normal sinus rhythm with sinus arrhythmia  Minimal voltage criteria for LVH, may be normal variant ( R in aVL )  Borderline ECG  When compared with ECG of 02-OCT-2021 13:35,  Questionable change in QRS axis     TROPONIN-HIGH SENSITIVITY    Collection Time: 12/27/21 11:53 PM   Result Value Ref Range    Troponin-High Sensitivity 5 0 - 51 ng/L   D DIMER    Collection Time: 12/27/21 11:53 PM   Result Value Ref Range    D-dimer 0.20 0.00 - 0.65 mg/L FEU       XR ABD (KUB)    Result Date: 12/27/2021  EXAM: XR ABD (KUB) INDICATION: constipation COMPARISON: None. FINDINGS: A supine radiograph of the abdomen shows no dilated loops of large small bowel with moderate pancolonic fecal stool burden. No soft tissue masses or pathologic calcifications are identified. The bones and soft tissues are within normal limits. Moderate pancolonic fecal stool burden. No acute findings otherwise. CT ABD PELV WO CONT    Result Date: 12/28/2021  CLINICAL HISTORY: left flank pain INDICATION: left flank pain COMPARISON: None CONTRAST:  None. TECHNIQUE: Thin axial images were obtained through the abdomen and pelvis. Coronal and sagittal reformats were generated. Oral contrast was not administered. CT dose reduction was achieved through use of a standardized protocol tailored for this examination and automatic exposure control for dose modulation.  The absence of intravenous contrast material reduces the sensitivity for evaluation of visceral organs and vasculature including presence of small mass lesions, hemodynamically significant stenoses, dissections, mucosal abnormalities etc. FINDINGS: LOWER THORAX: No significant abnormality in the incidentally imaged lower chest. LIVER/GALLBLADDER: No mass. Gallbladder is within normal limits. CBD is not dilated. SPLEEN/PANCREAS:  within normal limits. ADRENALS/KIDNEYS: Unremarkable. No calculus or hydronephrosis. STOMACH: Unremarkable. SMALL BOWEL/COLON: No dilatation or wall thickening. Fecal stasis APPENDIX: Unremarkable. PERITONEUM: No ascites or pneumoperitoneum. RETROPERITONEUM: No lymphadenopathy or aortic aneurysm. REPRODUCTIVE ORGANS: Unremarkable URINARY BLADDER: No mass or calculus. BONES: No destructive bone lesion. ADDITIONAL COMMENTS: N/A     No acute intraperitoneal process is identified. Incidental and/or nonemergent findings are as described in detail above.

## 2021-12-28 NOTE — ED TRIAGE NOTES
Pt. States has hx of MS, states pain started today on left side flank area, states has been constipated, last BM was last week sometime. Has not tried a laxative.

## 2021-12-28 NOTE — ED PROVIDER NOTES
HPI   21 yo F presents with left flank pain onset earlier today, worse with coughing, breathing and movement. Denies sob, c/o left sided chest pain. Denies fever, chills, nausea, vomiting, diarrhea, constipation. Last BM last week. Hx of MS, not currently on medications. LMP-Nov 30, denies pregnancy. Past Medical History:   Diagnosis Date    MS (multiple sclerosis) ([facility] [address] 02/10/2021       No past surgical history on file. Family History:   Problem Relation Age of Onset    Hypertension Mother     Hypertension Father    [facility] Elevated Lipids Father     Cancer Paternal Grandmother     Diabetes Maternal Grandmother        Social History     Socioeconomic History    Marital status: SINGLE     Spouse name: Not on file    Number of children: Not on file    Years of education: Not on file    Highest education level: Not on file   Occupational History    Not on file   Tobacco Use    Smoking status: Passive Smoke Exposure - Never Smoker    Smokeless tobacco: Never Used   Vaping Use    Vaping Use: Never used   Substance and Sexual Activity    Alcohol use: No    Drug use: No    Sexual activity: Never   Other Topics Concern    Not on file   Social History Narrative    Not on file     Social Determinants of Health     Financial Resource Strain:     Difficulty of Paying Living Expenses: Not on file   Food Insecurity:     Worried About Running Out of Food in the Last Year: Not on file    Nestor of Food in the Last Year: Not on file   Transportation Needs:     Lack of Transportation (Medical): Not on file    Lack of Transportation (Non-Medical):  Not on file   Physical Activity:     Days of Exercise per Week: Not on file    Minutes of Exercise per Session: Not on file   Stress:     Feeling of Stress : Not on file   Social Connections:     Frequency of Communication with Friends and Family: Not on file    Frequency of Social Gatherings with Friends and Family: Not on file    Attends Quaker Services: Not on file    Active Member of Clubs or Organizations: Not on file    Attends Club or Organization Meetings: Not on file    Marital Status: Not on file   Intimate Partner Violence:     Fear of Current or Ex-Partner: Not on file    Emotionally Abused: Not on file    Physically Abused: Not on file    Sexually Abused: Not on file   Housing Stability:     Unable to Pay for Housing in the Last Year: Not on file    Number of [place] in the Last Year: Not on file    Unstable Housing in the Last Year: Not on file         ALLERGIES: Patient has no known allergies. Review of Systems   Constitutional: Negative for chills and fever. HENT: Negative for congestion and sore throat. Respiratory: Positive for shortness of breath. Negative for cough. Cardiovascular: Positive for chest pain. Negative for leg swelling. Gastrointestinal: Negative for abdominal pain, diarrhea and vomiting. Genitourinary: Positive for flank pain. Negative for dysuria, hematuria, vaginal bleeding and vaginal discharge. Musculoskeletal: Positive for back pain. Neurological: Negative for weakness, numbness and headaches. All other systems reviewed and are negative.       Vitals:    12/27/21 2006 12/27/21 2222   BP: (!) 144/69 116/70   Pulse: 76 73   Resp: 17 16   Temp: 97 °F (36.1 °C) 97.4 °F (36.3 °C)   SpO2: 100% 100%   Weight: 104.3 kg (230 lb)             Physical Exam   Physical Examination: General appearance - alert, well appearing, and in no distress, oriented to person, place, and time and normal appearing weight  Eyes - pupils equal and reactive, extraocular eye movements intact  Neck - supple, no significant adenopathy  Chest - clear to auscultation, no wheezes, rales or rhonchi, symmetric air entry  Heart - normal rate, regular rhythm, normal S1, S2, no murmurs, rubs, clicks or gallops  Abdomen - soft, mild left-sided abdominal tenderness, no rebound/guarding/peritoneal signs, nondistended, no masses or organomegaly  Back exam - full range of motion, no tenderness, palpable spasm or pain on motion  Neurological - alert, oriented, normal speech, no focal findings or movement disorder noted  Musculoskeletal - no joint tenderness, deformity or swelling  Extremities - peripheral pulses normal, no pedal edema, no clubbing or cyanosis  Skin - normal coloration and turgor, no rashes, no suspicious skin lesions noted  MDM  Number of Diagnoses or Management Options     Amount and/or Complexity of Data Reviewed  Clinical lab tests: ordered and reviewed  Tests in the radiology section of CPT®: ordered and reviewed  Decide to obtain previous medical records or to obtain history from someone other than the patient: yes  Review and summarize past medical records: yes  Independent visualization of images, tracings, or specimens: yes    Patient Progress  Patient progress: improved         Procedures    ED EKG interpretation:  Rhythm: normal sinus rhythm; and irregular. Rate (approx.): 67; Axis: normal; P wave: normal; QRS interval: normal; ST/T wave: non-specific changes; sinus arrhythmia  EKG documented by Gini Olivares MD    No acute process on imaging. Will treat constipation. Vital signs stable. Patient is follow-up with PCP return to ED for worsening symptoms.

## 2022-01-11 ENCOUNTER — PATIENT OUTREACH (OUTPATIENT)
Dept: CASE MANAGEMENT | Age: 22
End: 2022-01-11

## 2022-01-12 NOTE — PROGRESS NOTES
01/11/22   Goals Addressed                 This Visit's Progress     Attends follow up appointments on schedule   On track     10/22/21   Educated on importance of keeping all follow up appointments   Will continue to follow up with Cardiology, Neurology and PCP   Will attend the following appointment as scheduled  o 11/2/2021 2:40 PM o Amanda York MD o [facility] o BS AMB o   11/3/2021 2:00 PM o Suzy Meade MD o [address] will follow in 12-14 days. Pmk    11/10/21   Has followed up as scheduled with all appointments   Last follow up was on 11/3/21 with cardiology   Will follow up again in 1 month with cardiology   Will attend the following:  o 11/19/2021 4:00 PM o CELSO CAMPOS o [facility] o BS AMB o   12/20/2021 3:00 PM o Reed Brink NP o [facility] o BS AMB o   2/2/2022 3:40 PM o Amanda York MD o [facility] will follow again in 12-14 days. Pmk    12/03/21   Patient completed follow up as scheduled on 11/19/21   Waiting for test results to re-schedule follow up   Will attend Cardiology follow up on 12/20/21 pmk    12/22/21   Attended follow up with cardiology on 12/20/21   Reviewed all up coming scheduled visits   Will attend next scheduled cardiology and neurology visits as scheduled.  ACM will follow up again  in 12-15 days. [address] [place] [address]    01/11/22   Has followed up as was scheduled   Reviewed all up coming appointments   Will attend as scheduled.:  o 1/24/2022 4:00 PM o Dionicio Huntley NP o [address] o BS AMB o   2/2/2022 3:40 PM o Amanda York MD o [id number] [facility] will follow up again  in 12-15 days.  [address] [place] [address]       Baseline activity   On track     10/22/21   Will maintain blood pressure somewhere between 90/60 or 120/80    Will continue to take medications as prescribed  Will notify providers if not feeling good or if headaches or dizziness    Will Keep all  doctor appointments as scheduled   Educated on the Importance of Supportive resources in place to maintain patient in the community (ie. Home Health, DME equipment, refer to, medication assistant plan, [facility] etc.)   ACM contact information given. Will follow up in 10-14 days. [address] [place] [address]    11/10/21    Admits to feeling well, denies chest pain or PACs during this episode   Will utilize supportive resources if needed   Continue to take medications as ordered   ACM will follow in 12-14 days. Pmk    12/03/21   Outreach completed, admits to feeling better, denies chest pain   Had follow up with cardiology   Waiting on results of sonogram   Taking medications as ordered   Will maintain blood pressure somewhere between 90/60 or 120/80 and keep log   ACM will follow up again in 12-14 days. Pmk    12/22/21   Outreach completed, admits to doing better   Results of Sonogram discussed with patient by provider   Continue to have episodes of chest pain and PACs associated with her MS diagnosis   Continue to verbalize stress related to diagnosis   Will continue to utilize conventional treatment for relief of PACs and chest discomfort   Will observe closely changes in condition and notify provider accordingly   ACM will follow up again in 12-14 days. pmk    01/11/22   ED 12/27/21 for chest pain, pain on left lower side, has had 3 Ed visits and 1 admission this year, says her chest pain is on left side. Found lump under arm.  Will continue to  take all medications as ordered   Will continue to observe closely changes in condition and notify provider accordingly   ACM will follow up again in 12-14 days.  pmk        

## 2022-01-20 RX ORDER — HYDROCHLOROTHIAZIDE 12.5 MG/1
TABLET ORAL
Qty: 30 TABLET | Refills: 0 | Status: SHIPPED | OUTPATIENT
Start: 2022-01-20 | End: 2022-02-21

## 2022-01-20 RX ORDER — AMLODIPINE BESYLATE 10 MG/1
TABLET ORAL
Qty: 30 TABLET | Refills: 0 | Status: SHIPPED | OUTPATIENT
Start: 2022-01-20 | End: 2022-02-21

## 2022-01-24 ENCOUNTER — OFFICE VISIT (OUTPATIENT)
Dept: FAMILY MEDICINE CLINIC | Age: 22
End: 2022-01-24
Payer: COMMERCIAL

## 2022-01-24 VITALS
WEIGHT: 227 LBS | RESPIRATION RATE: 18 BRPM | TEMPERATURE: 98.2 F | HEART RATE: 74 BPM | HEIGHT: 68 IN | BODY MASS INDEX: 34.4 KG/M2 | SYSTOLIC BLOOD PRESSURE: 119 MMHG | OXYGEN SATURATION: 100 % | DIASTOLIC BLOOD PRESSURE: 80 MMHG

## 2022-01-24 DIAGNOSIS — L73.2 HIDRADENITIS SUPPURATIVA OF RIGHT AXILLA: Primary | ICD-10-CM

## 2022-01-24 PROCEDURE — 99213 OFFICE O/P EST LOW 20 MIN: CPT | Performed by: NURSE PRACTITIONER

## 2022-01-24 RX ORDER — CLINDAMYCIN PHOSPHATE 11.9 MG/ML
SOLUTION TOPICAL
Qty: 60 ML | Refills: 2 | Status: SHIPPED | OUTPATIENT
Start: 2022-01-24 | End: 2022-06-08

## 2022-01-24 NOTE — PROGRESS NOTES
Chief Complaint   Patient presents with    Skin Problem     Patient in office today for skin problem that was noted on 1/2. Location is right armpit and behind right ear. Initially sore, felt like it was under the skin. Denies coming to a head or draining. Denies any recent illness. Had COVID vaccine in November (second dose). Have since noted decrease in size. Has had this problem before, boils. Pt denies drainage. Skin problem will resolve and then reoccur. Have not treated with otc. Pt uses rizo for hair removal.  Using a lot of different soaps. Denies any other concerns at this time. Chief Complaint   Patient presents with    Skin Problem     she is a 22y.o. year old female who presents for evalution. Reviewed PmHx, RxHx, FmHx, SocHx, AllgHx and updated and dated in the chart. Review of Systems - negative except as listed above in the HPI    Objective:     Vitals:    01/24/22 1603   BP: 119/80   Pulse: 74   Resp: 18   Temp: 98.2 °F (36.8 °C)   TempSrc: Oral   SpO2: 100%   Weight: 227 lb (103 kg)   Height: 5' 8\" (1.727 m)     Physical Examination: General appearance - alert, well appearing, and in no distress  Skin - discrete nodule present in right axilla that is mobile and feels subcutaneous, slightly tender, no drainage from mass    Assessment/ Plan:   Diagnoses and all orders for this visit:    1. Hidradenitis suppurativa of right axilla  -     clindamycin (CLEOCIN T) 1 % external solution; use thin film on affected area  Discussed diagnosis with pt and that recurrence of boils are common and in what areas. Enc to use dial soap in axilla. Apply clindamycin spray after showering to decrease bacteria count. Okay to continue using rioz. Reviewed s/sx that warrant abx for management. Follow-up and Dispositions    · Return if symptoms worsen or fail to improve. I have discussed the diagnosis with the patient and the intended plan as seen in the above orders.   The patient has received an after-visit summary and questions were answered concerning future plans. Medication Side Effects and Warnings were discussed with patient: yes  Patient Labs were reviewed and or requested: no  Patient Past Records were reviewed and or requested  yes  Patient / Caregiver Understanding of treatment plan was verbalized during office visit YES    LUIZ Calloway    Patient Instructions        Learning About Hidradenitis Suppurativa  What is hidradenitis suppurativa? Hidradenitis suppurativa (say \"zvd-xdyq-bg-[place]-tus sup-yur-uh-TY-vuh\") is a skin condition that causes lumps on the skin. The lumps look like pimples or boils. The condition can come and go for many years. This skin condition can't be spread from person to person (isn't contagious). Doctors don't know exactly how this condition starts. But they do know that something irritates and inflames the hair follicles, causing them to swell and form lumps. What are the symptoms? Red lumps that may look like pimples, acne, or boils appear on the skin and are usually painful. The lumps:  · Usually occur in areas where skin rubs against skin, such as in the armpit. They can also appear under the breasts, in the groin area, on the buttocks, around the anus, and on the inner thighs. · May go away on their own in a few weeks. But they often come back in the same area. · Can become infected and break open, draining blood and pus that usually smells bad. If the condition isn't treated and gets worse, hollow tunnels can form under the skin. Over time, the infection and tunnels will heal, but a thick scar may form. These scars can keep skin from stretching naturally. How is hidradenitis suppurativa treated? The treatment depends on how serious the condition is. Your doctor may discuss:  · Medicines. You may need to take pills, such as antibiotics, or rub a prescription ointment or cream on the affected skin.   · Corticosteroid injections (shots). You may get these shots into the affected areas. · Hormone pills. Some women are helped by taking birth control pills or other medicines that affect their hormones. · Removing infected tissue. Home treatment  Home treatment may provide pain relief and help prevent nodules from coming back. Home treatment includes:  · Wearing loose-fitting clothes. · Washing the area gently with mild soap. · Staying at a healthy weight. If you are overweight, losing weight may help the condition. · Quitting smoking, if you smoke. Follow-up care is a key part of your treatment and safety. Be sure to make and go to all appointments, and call your doctor if you are having problems. It's also a good idea to know your test results and keep a list of the medicines you take. Where can you learn more? Go to http://www.gray.com/  Enter J430 in the search box to learn more about \"Learning About Hidradenitis Suppurativa. \"  Current as of: March 3, 2021               Content Version: 13.0  © 2006-2021 Healthwise[employer]. Care instructions adapted under license by [employer] (which disclaims liability or warranty for this information). If you have questions about a medical condition or this instruction, always ask your healthcare professional. [address] any warranty or liability for your use of this information.

## 2022-01-24 NOTE — PATIENT INSTRUCTIONS
Learning About Hidradenitis Suppurativa  What is hidradenitis suppurativa? Hidradenitis suppurativa (say \"fxw-fddy-df-NY-tus sup-natalia-uh-TY-vuh\") is a skin condition that causes lumps on the skin. The lumps look like pimples or boils. The condition can come and go for many years. This skin condition can't be spread from person to person (isn't contagious). Doctors don't know exactly how this condition starts. But they do know that something irritates and inflames the hair follicles, causing them to swell and form lumps. What are the symptoms? Red lumps that may look like pimples, acne, or boils appear on the skin and are usually painful. The lumps:  · Usually occur in areas where skin rubs against skin, such as in the armpit. They can also appear under the breasts, in the groin area, on the buttocks, around the anus, and on the inner thighs. · May go away on their own in a few weeks. But they often come back in the same area. · Can become infected and break open, draining blood and pus that usually smells bad. If the condition isn't treated and gets worse, hollow tunnels can form under the skin. Over time, the infection and tunnels will heal, but a thick scar may form. These scars can keep skin from stretching naturally. How is hidradenitis suppurativa treated? The treatment depends on how serious the condition is. Your doctor may discuss:  · Medicines. You may need to take pills, such as antibiotics, or rub a prescription ointment or cream on the affected skin. · Corticosteroid injections (shots). You may get these shots into the affected areas. · Hormone pills. Some women are helped by taking birth control pills or other medicines that affect their hormones. · Removing infected tissue. Home treatment  Home treatment may provide pain relief and help prevent nodules from coming back. Home treatment includes:  · Wearing loose-fitting clothes. · Washing the area gently with mild soap.   · Staying at a healthy weight. If you are overweight, losing weight may help the condition. · Quitting smoking, if you smoke. Follow-up care is a key part of your treatment and safety. Be sure to make and go to all appointments, and call your doctor if you are having problems. It's also a good idea to know your test results and keep a list of the medicines you take. Where can you learn more? Go to http://www.gray.com/  Enter J430 in the search box to learn more about \"Learning About Hidradenitis Suppurativa. \"  Current as of: March 3, 2021               Content Version: 13.0  © 7824-7264 Healthwise, [employer]. Care instructions adapted under license by [employer] (which disclaims liability or warranty for this information). If you have questions about a medical condition or this instruction, always ask your healthcare professional. [address] any warranty or liability for your use of this information.

## 2022-01-24 NOTE — PROGRESS NOTES
Chief Complaint   Patient presents with    Skin Problem     Patient in office today for skin problem that was noted on 1/2. Location is right armpit ad behind right ear. Have since noted decrease in size. Pt denies drainage. Skin problem will resolve and then reoccur. Have not treated with otc. Pt uses rizo for hair removal.      1. Have you been to the ER, urgent care clinic since your last visit? Hospitalized since your last visit? No    2. Have you seen or consulted any other health care providers outside of the [address] [place] [address] since your last visit? Include any pap smears or colon screening.  No

## 2022-01-26 ENCOUNTER — PATIENT OUTREACH (OUTPATIENT)
Dept: CASE MANAGEMENT | Age: 22
End: 2022-01-26

## 2022-01-28 NOTE — PROGRESS NOTES
Goals Addressed                 This Visit's Progress     Attends follow up appointments on schedule   On track     10/22/21   Educated on importance of keeping all follow up appointments   Will continue to follow up with Cardiology, Neurology and PCP   Will attend the following appointment as scheduled  o 11/2/2021 2:40 PM o Usha Tamayo MD o [facility] o BS AMB o   11/3/2021 2:00 PM o Calvin Bennett MD o [address] will follow in 12-14 days. Pmk    11/10/21   Has followed up as scheduled with all appointments   Last follow up was on 11/3/21 with cardiology   Will follow up again in 1 month with cardiology   Will attend the following:  o 11/19/2021 4:00 PM o CELSO CAMPOS o [facility] o BS AMB o   12/20/2021 3:00 PM o Rich Dhaliwal NP o [facility] o BS AMB o   2/2/2022 3:40 PM o Usha Tamayo MD o [facility] will follow again in 12-14 days. Pmk    12/03/21   Patient completed follow up as scheduled on 11/19/21   Waiting for test results to re-schedule follow up   Will attend Cardiology follow up on 12/20/21 pmk    12/22/21   Attended follow up with cardiology on 12/20/21   Reviewed all up coming scheduled visits   Will attend next scheduled cardiology and neurology visits as scheduled.  ACM will follow up again  in 12-15 days. [address] [place] [address]    01/11/22   Has followed up as was scheduled   Reviewed all up coming appointments   Will attend as scheduled.:  o 1/24/2022 4:00 PM o Matt Martínez NP o [address] o BS AMB o   2/2/2022 3:40 PM o Usha Tamayo MD o [address] [facility] will follow up again  in 12-15 days. [address] [place] [address]      01/27/22   Attended appointment on 1/24/22   Reviewed all scheduled visits   Will follow up with Neuro on 2/2/22.  pmk       Baseline activity   On track 10/22/21   Will maintain blood pressure somewhere between 90/60 or 120/80    Will continue to take medications as prescribed    Will notify providers if not feeling good or if headaches or dizziness    Will Keep all  doctor appointments as scheduled   Educated on the Importance of Supportive resources in place to maintain patient in the community (ie. Home Health, DME equipment, refer to, medication assistant plan, [facility] etc.)   ACM contact information given. Will follow up in 10-14 days. [address] [place] [address]    11/10/21    Admits to feeling well, denies chest pain or PACs during this episode   Will utilize supportive resources if needed   Continue to take medications as ordered   ACM will follow in 12-14 days. Pmk    12/03/21   Outreach completed, admits to feeling better, denies chest pain   Had follow up with cardiology   Waiting on results of sonogram   Taking medications as ordered   Will maintain blood pressure somewhere between 90/60 or 120/80 and keep log   ACM will follow up again in 12-14 days. Pmk    12/22/21   Outreach completed, admits to doing better   Results of Sonogram discussed with patient by provider   Continue to have episodes of chest pain and PACs associated with her MS diagnosis   Continue to verbalize stress related to diagnosis   Will continue to utilize conventional treatment for relief of PACs and chest discomfort   Will observe closely changes in condition and notify provider accordingly   ACM will follow up again in 12-14 days. pmk    01/11/22   ED 12/27/21 for chest pain, pain on left lower side, has had 3 Ed visits and 1 admission this year, says her chest pain is on left side. Found lump under arm.  Will continue to  take all medications as ordered   Will continue to observe closely changes in condition and notify provider accordingly   ACM will follow up again in 12-14 days.  pmk       01/27/22   No Ed admits in 30 days   Continue to take pain medications as ordered   Will observe closely underarm rash and will notify if changes   ACM will follow up again in 12-14 days.  pmk  

## 2022-01-31 ENCOUNTER — TELEPHONE (OUTPATIENT)
Dept: NEUROLOGY | Age: 22
End: 2022-01-31

## 2022-02-01 RX ORDER — SULFAMETHOXAZOLE AND TRIMETHOPRIM 800; 160 MG/1; MG/1
1 TABLET ORAL 2 TIMES DAILY
Qty: 14 TABLET | Refills: 0 | Status: SHIPPED | OUTPATIENT
Start: 2022-02-01 | End: 2022-02-08

## 2022-02-02 ENCOUNTER — OFFICE VISIT (OUTPATIENT)
Dept: NEUROLOGY | Age: 22
End: 2022-02-02
Payer: COMMERCIAL

## 2022-02-02 VITALS
OXYGEN SATURATION: 98 % | DIASTOLIC BLOOD PRESSURE: 78 MMHG | BODY MASS INDEX: 33.45 KG/M2 | RESPIRATION RATE: 18 BRPM | HEART RATE: 85 BPM | WEIGHT: 220 LBS | SYSTOLIC BLOOD PRESSURE: 126 MMHG

## 2022-02-02 DIAGNOSIS — G35 MS (MULTIPLE SCLEROSIS) (HCC): Primary | ICD-10-CM

## 2022-02-02 PROCEDURE — 99214 OFFICE O/P EST MOD 30 MIN: CPT | Performed by: SPECIALIST

## 2022-02-02 NOTE — PROGRESS NOTES
Neurology Consult      Subjective:      Tom Zamora is a 22 y.o. female who comes in today with her mother. Has MS and history of prior right optic neuritis expression. There was some mixup on getting the prescription filled but I was out on leave for 3 months so that probably goes to explaining the issue as noted. The good news is she is getting her prescription filled within the next week. Was reminded that as part of taking the Aubagio the safety and supervision goes to getting and end of month liver function test for 6 straight months. Says this will be done at the primary care level which is fine and I am sure it is more convenient than coming here. Thereafter there may be periodic supervision depending on clinical history and other factors. Does not reference any new type of MS symptoms. Does work at [employer] in [facility] and occasionally feels a little lightheaded. I suggested she keep up-to-date on her state of hydration as her job is physical as I know it. I thought her exam today was strictly baseline and nothing new encountered in the way of history or exam otherwise. We will see her back in 4 months to see what kind of progress she is making etc.  Did not reference any new medical or surgical history to me today, and mother was present as before on her visit. Current Outpatient Medications   Medication Sig Dispense Refill    trimethoprim-sulfamethoxazole (BACTRIM DS, SEPTRA DS) 160-800 mg per tablet Take 1 Tablet by mouth two (2) times a day for 7 days. 14 Tablet 0    clindamycin (CLEOCIN T) 1 % external solution use thin film on affected area 60 mL 2    hydroCHLOROthiazide (HYDRODIURIL) 12.5 mg tablet Take 1 tablet by mouth once daily 30 Tablet 0    amLODIPine (NORVASC) 10 mg tablet Take 1 tablet by mouth once daily 30 Tablet 0    norethindrone-e estradiol-iron (LOESTRIN FE) 1 mg-20 mcg (24)/75 mg (4) tab Take 1 Tablet by mouth daily.  3 Dose Pack 3    teriflunomide (Aubagio) 14 mg tablet Take 14 mg by mouth daily. Will be starting this in February (Patient not taking: Reported on 2/2/2022)        No Known Allergies  Past Medical History:   Diagnosis Date    MS (multiple sclerosis) ([facility].) 02/10/2021      No past surgical history on file. Social History     Socioeconomic History    Marital status: SINGLE     Spouse name: Not on file    Number of children: Not on file    Years of education: Not on file    Highest education level: Not on file   Occupational History    Not on file   Tobacco Use    Smoking status: Passive Smoke Exposure - Never Smoker    Smokeless tobacco: Never Used   Vaping Use    Vaping Use: Never used   Substance and Sexual Activity    Alcohol use: No    Drug use: No    Sexual activity: Never   Other Topics Concern    Not on file   Social History Narrative    Not on file     Social Determinants of Health     Financial Resource Strain:     Difficulty of Paying Living Expenses: Not on file   Food Insecurity:     Worried About Running Out of Food in the Last Year: Not on file    Nestor of Food in the Last Year: Not on file   Transportation Needs:     Lack of Transportation (Medical): Not on file    Lack of Transportation (Non-Medical):  Not on file   Physical Activity:     Days of Exercise per Week: Not on file    Minutes of Exercise per Session: Not on file   Stress:     Feeling of Stress : Not on file   Social Connections:     Frequency of Communication with Friends and Family: Not on file    Frequency of Social Gatherings with Friends and Family: Not on file    Attends Roman Catholic Services: Not on file    Active Member of Clubs or Organizations: Not on file    Attends Club or Organization Meetings: Not on file    Marital Status: Not on file   Intimate Partner Violence:     Fear of Current or Ex-Partner: Not on file    Emotionally Abused: Not on file    Physically Abused: Not on file    Sexually Abused: Not on file   Housing Stability:     Unable to Pay for Housing in the Last Year: Not on file    Number of Places Lived in the Last Year: Not on file    Unstable Housing in the Last Year: Not on file      Family History   Problem Relation Age of Onset    Hypertension Mother     Hypertension Father    Tangela Staton Elevated Lipids Father     Cancer Paternal Grandmother     Diabetes Maternal Grandmother       Visit Vitals  /78   Pulse 85   Resp 18   Wt 99.8 kg (220 lb)   SpO2 98%   BMI 33.45 kg/m²        Review of Systems:   A comprehensive review of systems was negative except for that written in the HPI. Neuro Exam:     Appearance: The patient is well developed, well nourished, provides a coherent history and is in no acute distress. Mental Status: Oriented to time, place and person. Mood and affect appropriate. Cranial Nerves:   Intact visual fields. Fundi are benign on the left and she has her steady-state right optic nerve pallor and minor atrophic changes. . ALEX and positive right APD, EOM's full, no nystagmus, no ptosis. Facial sensation is normal. Corneal reflexes are intact. Facial movement is symmetric. Hearing is normal bilaterally. Palate is midline with normal sternocleidomastoid and trapezius muscles are normal. Tongue is midline. Motor:  5/5 strength in upper and lower proximal and distal muscles. Normal bulk and tone. No fasciculations. Reflexes:   Deep tendon reflexes 2+/4 and symmetrical.   Sensory:   Normal to touch, pinprick and vibration. Gait:  Normal gait. Tremor:   No tremor noted. Cerebellar:  No cerebellar signs present. Neurovascular:  Normal heart sounds and regular rhythm, peripheral pulses intact, and no carotid bruits. Assessment:   Multiple sclerosis. Will be starting the Aubagio very shortly. Reminded that as part of the supervision and safety precautions she will be getting monthly liver function tests which she said will be done through her primary care.   Should get a good calendar and john off when she starts the drug and at the end of 30 days get the blood work done. This will be done for 6 consecutive months. I will see her back in 4 months to check her progress. She works at [employer] and sometimes gets a little lightheaded and suggestions go to staying well hydrated. Plan:   Revisit 4 months.   Signed by :  Ayah Vaughan MD

## 2022-02-02 NOTE — PATIENT INSTRUCTIONS
Patient history viewed patient examined. I am very happy that the prescription and the medicine is going to be filled very shortly. Reminded that the liver function tests will be done at the end of each month once the medicine is started for 6 consecutive months. I would like to see her back in 4 months and check her progress. Does not really reference any new medical or surgical history. Exam looks strictly baseline today as noted under the exam section. Reminded to keep her state of hydration up even in the winter months. She works at [employer]

## 2022-02-02 NOTE — LETTER
2/2/2022    Patient: Nam Tobar   YOB: 2000   Date of Visit: 2/2/2022     Kelsy Birmingham MD  [id number] [facility] [address]  [address] [facility] [address]    Dear Kelsy Birmingham MD,      Thank you for referring Ms. Shantanu Malave to [facility] for evaluation. My notes for this consultation are attached. If you have questions, please do not hesitate to call me. I look forward to following your patient along with you.       Sincerely,    Candace Matthews MD

## 2022-02-03 RX ORDER — MOMETASONE FUROATE 1 MG/G
CREAM TOPICAL
Qty: 15 G | Refills: 1 | Status: SHIPPED | OUTPATIENT
Start: 2022-02-03 | End: 2022-06-08

## 2022-02-10 ENCOUNTER — PATIENT OUTREACH (OUTPATIENT)
Dept: CASE MANAGEMENT | Age: 22
End: 2022-02-10

## 2022-02-11 NOTE — PROGRESS NOTES
Goals Addressed                 This Visit's Progress     Attends follow up appointments on schedule   On track     10/22/21   Educated on importance of keeping all follow up appointments   Will continue to follow up with Cardiology, Neurology and PCP   Will attend the following appointment as scheduled  o 11/2/2021 2:40 PM o Jessi Mcclain MD o [address] [facility] o BS AMB o   11/3/2021 2:00 PM o Paris Jc MD o [address]  will follow in 12-14 days. Pmk    11/10/21   Has followed up as scheduled with all appointments   Last follow up was on 11/3/21 with cardiology   Will follow up again in 1 month with cardiology   Will attend the following:  o 11/19/2021 4:00 PM o CELSO CAMPOS o [facility] o BS AMB o   12/20/2021 3:00 PM o Marne Aschoff, NP o [facility] o BS AMB o   2/2/2022 3:40 PM o Jessi Mcclain MD o [address] [facility] will follow again in 12-14 days. Pmk    12/03/21   Patient completed follow up as scheduled on 11/19/21   Waiting for test results to re-schedule follow up   Will attend Cardiology follow up on 12/20/21 pmk    12/22/21   Attended follow up with cardiology on 12/20/21   Reviewed all up coming scheduled visits   Will attend next scheduled cardiology and neurology visits as scheduled.  ACM will follow up again  in 12-15 days. [address] [place] [address]    01/11/22   Has followed up as was scheduled   Reviewed all up coming appointments   Will attend as scheduled.:  o 1/24/2022 4:00 PM o Melanie Hi NP o [address] o BS AMB o   2/2/2022 3:40 PM o Jessi Mcclain MD o [id number] [facility] will follow up again  in 12-15 days. [address] [place] [address]      01/27/22   Attended appointment on 1/24/22   Reviewed all scheduled visits   Will follow up with Neuro on 2/2/22.  Pmk    02/11/22      Reminded of up coming appointments   Will attend the following:  o 6/8/2022 3:00 PM (Arrive by 2:50 PM) Grey Albert, Stephannie Bumpers, MD o   Will follow up for next PCP and Cardiology   ACM will follow up in 12-14 days. pmk       Baseline activity   On track     10/22/21   Will maintain blood pressure somewhere between 90/60 or 120/80    Will continue to take medications as prescribed    Will notify providers if not feeling good or if headaches or dizziness    Will Keep all  doctor appointments as scheduled   Educated on the Importance of Supportive resources in place to maintain patient in the community (ie. Home Health, DME equipment, refer to, medication assistant plan, [facility] etc.)   ACM contact information given. Will follow up in 10-14 days. [address] [place] [address]    11/10/21    Admits to feeling well, denies chest pain or PACs during this episode   Will utilize supportive resources if needed   Continue to take medications as ordered   ACM will follow in 12-14 days. Pmk    12/03/21   Outreach completed, admits to feeling better, denies chest pain   Had follow up with cardiology   Waiting on results of sonogram   Taking medications as ordered   Will maintain blood pressure somewhere between 90/60 or 120/80 and keep log   ACM will follow up again in 12-14 days. Pmk    12/22/21   Outreach completed, admits to doing better   Results of Sonogram discussed with patient by provider   Continue to have episodes of chest pain and PACs associated with her MS diagnosis   Continue to verbalize stress related to diagnosis   Will continue to utilize conventional treatment for relief of PACs and chest discomfort   Will observe closely changes in condition and notify provider accordingly   ACM will follow up again in 12-14 days. pmk    01/11/22   ED 12/27/21 for chest pain, pain on left lower side, has had 3 Ed visits and 1 admission this year, says her chest pain is on left side. Found lump under arm.    Will continue to  take all medications as ordered   Will continue to observe closely changes in condition and notify provider accordingly   ACM will follow up again in 12-14 days. pmk       01/27/22   No Ed admits in 30 days   Continue to take pain medications as ordered   Will observe closely underarm rash and will notify if changes   ACM will follow up again in 12-14 days. pmk      02/10/22   Attempted outreach   VM left with ACM contact information. Pmk    02/10/22   Inbound Call from patient who says she is doing much better   Abscess resolved under arm with antibiotics   Will continue all medications as ordered   Was reminded that as part of taking the Aubagio the safety and supervision goes to getting and end of month liver function test for 6 straight months   Will be done at the primary care level    ACM will follow up in 12-14 days.  pmk

## 2022-02-11 NOTE — PROGRESS NOTES
02/10/22   ACM attempted to follow up with patient for CCM assessment. Attempts to reach patient were unsuccessful. On final call a VM was left for patient with the following information: ACM contact information and reason for call. Will follow up again in 7 days. Goals Addressed                 This Visit's Progress     Attends follow up appointments on schedule   On track     10/22/21   Educated on importance of keeping all follow up appointments   Will continue to follow up with Cardiology, Neurology and PCP   Will attend the following appointment as scheduled  o 11/2/2021 2:40 PM o Alexis Hinton MD o [address] [facility] o BS AMB o   11/3/2021 2:00 PM o Maggie Tolentino MD o [address] [facility] will follow in 12-14 days. Pmk    11/10/21   Has followed up as scheduled with all appointments   Last follow up was on 11/3/21 with cardiology   Will follow up again in 1 month with cardiology   Will attend the following:  o 11/19/2021 4:00 PM o CELSO CAMPOS o [facility] o BS AMB o   12/20/2021 3:00 PM o Bhupinder Denis NP o [facility] o BS AMB o   2/2/2022 3:40 PM o Alexis Hinton MD o [address] [place] [address] will follow again in 12-14 days. Pmk    12/03/21   Patient completed follow up as scheduled on 11/19/21   Waiting for test results to re-schedule follow up   Will attend Cardiology follow up on 12/20/21 pmk    12/22/21   Attended follow up with cardiology on 12/20/21   Reviewed all up coming scheduled visits   Will attend next scheduled cardiology and neurology visits as scheduled.  ACM will follow up again  in 12-15 days.  [address] [place] [address]    01/11/22   Has followed up as was scheduled   Reviewed all up coming appointments   Will attend as scheduled.:  o 1/24/2022 4:00 PM o Chyna Barba NP o [address] o BS AMB o   2/2/2022 3:40 Monique Blake MD o [facility] will follow up again  in 12-15 days. [facility]      01/27/22   Attended appointment on 1/24/22   Reviewed all scheduled visits   Will follow up with Neuro on 2/2/22. pmk       Baseline activity   On track     10/22/21   Will maintain blood pressure somewhere between 90/60 or 120/80    Will continue to take medications as prescribed    Will notify providers if not feeling good or if headaches or dizziness    Will Keep all  doctor appointments as scheduled   Educated on the Importance of Supportive resources in place to maintain patient in the community (ie. Home Health, DME equipment, refer to, medication assistant plan, [facility] etc.)   ACM contact information given. Will follow up in 10-14 days. [facility]    11/10/21    Admits to feeling well, denies chest pain or PACs during this episode   Will utilize supportive resources if needed   Continue to take medications as ordered   ACM will follow in 12-14 days. Pmk    12/03/21   Outreach completed, admits to feeling better, denies chest pain   Had follow up with cardiology   Waiting on results of sonogram   Taking medications as ordered   Will maintain blood pressure somewhere between 90/60 or 120/80 and keep log   ACM will follow up again in 12-14 days. Pmk    12/22/21   Outreach completed, admits to doing better   Results of Sonogram discussed with patient by provider   Continue to have episodes of chest pain and PACs associated with her MS diagnosis   Continue to verbalize stress related to diagnosis   Will continue to utilize conventional treatment for relief of PACs and chest discomfort   Will observe closely changes in condition and notify provider accordingly   ACM will follow up again in 12-14 days. pmk    01/11/22   ED 12/27/21 for chest pain, pain on left lower side, has had 3 Ed visits and 1 admission this year, says her chest pain is on left side. Found lump under arm.    Will continue to  take all medications as ordered   Will continue to observe closely changes in condition and notify provider accordingly   ACM will follow up again in 12-14 days. pmk       01/27/22   No Ed admits in 30 days   Continue to take pain medications as ordered   Will observe closely underarm rash and will notify if changes   ACM will follow up again in 12-14 days. pmk      02/10/22   Attempted outreach   VM left with ACM contact information.  pmk

## 2022-02-20 ENCOUNTER — HOSPITAL ENCOUNTER (EMERGENCY)
Age: 22
Discharge: HOME | End: 2022-02-20
Attending: EMERGENCY MEDICINE
Payer: COMMERCIAL

## 2022-02-20 VITALS
OXYGEN SATURATION: 100 % | WEIGHT: 220 LBS | DIASTOLIC BLOOD PRESSURE: 76 MMHG | SYSTOLIC BLOOD PRESSURE: 140 MMHG | HEIGHT: 68 IN | TEMPERATURE: 97.8 F | RESPIRATION RATE: 20 BRPM | BODY MASS INDEX: 33.34 KG/M2 | HEART RATE: 70 BPM

## 2022-02-20 DIAGNOSIS — N94.10 DYSPAREUNIA IN FEMALE: Primary | ICD-10-CM

## 2022-02-20 DIAGNOSIS — N76.0 BV (BACTERIAL VAGINOSIS): ICD-10-CM

## 2022-02-20 DIAGNOSIS — R30.0 DYSURIA: ICD-10-CM

## 2022-02-20 DIAGNOSIS — B96.89 BV (BACTERIAL VAGINOSIS): ICD-10-CM

## 2022-02-20 LAB
APPEARANCE UR: CLEAR
BACTERIA URNS QL MICRO: NEGATIVE /HPF
BILIRUB UR QL: NEGATIVE
COLOR UR: ABNORMAL
EPITH CASTS URNS QL MICRO: ABNORMAL /LPF
GLUCOSE UR STRIP.AUTO-MCNC: NEGATIVE MG/DL
HGB UR QL STRIP: ABNORMAL
HYALINE CASTS URNS QL MICRO: ABNORMAL /LPF (ref 0–5)
KETONES UR QL STRIP.AUTO: NEGATIVE MG/DL
LEUKOCYTE ESTERASE UR QL STRIP.AUTO: ABNORMAL
NITRITE UR QL STRIP.AUTO: NEGATIVE
PH UR STRIP: 6 [PH] (ref 5–8)
PROT UR STRIP-MCNC: NEGATIVE MG/DL
RBC #/AREA URNS HPF: ABNORMAL /HPF (ref 0–5)
SP GR UR REFRACTOMETRY: 1.01 (ref 1–1.03)
UA: UC IF INDICATED,UAUC: ABNORMAL
UROBILINOGEN UR QL STRIP.AUTO: 0.2 EU/DL (ref 0.2–1)
WBC URNS QL MICRO: ABNORMAL /HPF (ref 0–4)

## 2022-02-20 PROCEDURE — 81001 URINALYSIS AUTO W/SCOPE: CPT

## 2022-02-20 PROCEDURE — 99283 EMERGENCY DEPT VISIT LOW MDM: CPT

## 2022-02-20 RX ORDER — METRONIDAZOLE 500 MG/1
500 TABLET ORAL 2 TIMES DAILY
Qty: 14 TABLET | Refills: 0 | Status: SHIPPED | OUTPATIENT
Start: 2022-02-20 | End: 2022-02-27

## 2022-02-20 RX ORDER — FLUCONAZOLE 150 MG/1
TABLET ORAL
Qty: 1 TABLET | Refills: 0 | Status: SHIPPED | OUTPATIENT
Start: 2022-02-20

## 2022-02-20 NOTE — ED TRIAGE NOTES
Patient arrives with c/o vaginal pain, itching, and white discharge x 1 week. Further reports pain with sex, and dysuria. Denies fever, chill, N/V, chest pain, SOB.

## 2022-02-20 NOTE — ED PROVIDER NOTES
28-year-old female history of MS, hypertension presents to the emergency department chief complaint of dysuria for about a week along with dyspareunia and some thin white discharge. No fevers or chills. No nausea or vomiting. The history is provided by the patient and medical records. Urinary Pain   This is a new problem. The current episode started more than 2 days ago. The problem occurs every urination. The problem has been gradually worsening. The quality of the pain is described as burning. The pain is moderate. There has been no fever. She is sexually active. Associated symptoms include vaginal discharge. Pertinent negatives include no nausea, no vomiting, no frequency, no hematuria and no abdominal pain. Her past medical history does not include kidney stones or recurrent UTIs. Past Medical History:   Diagnosis Date    MS (multiple sclerosis) ([facility] 02/10/2021       No past surgical history on file.       Family History:   Problem Relation Age of Onset    Hypertension Mother     Hypertension Father    Gianfranco Willis Elevated Lipids Father     Cancer Paternal Grandmother     Diabetes Maternal Grandmother        Social History     Socioeconomic History    Marital status: SINGLE     Spouse name: Not on file    Number of children: Not on file    Years of education: Not on file    Highest education level: Not on file   Occupational History    Not on file   Tobacco Use    Smoking status: Passive Smoke Exposure - Never Smoker    Smokeless tobacco: Never Used   Vaping Use    Vaping Use: Never used   Substance and Sexual Activity    Alcohol use: No    Drug use: No    Sexual activity: Never   Other Topics Concern    Not on file   Social History Narrative    Not on file     Social Determinants of Health     Financial Resource Strain:     Difficulty of Paying Living Expenses: Not on file   Food Insecurity:     Worried About Running Out of Food in the Last Year: Not on file    [address] [facility] in the Last Year: Not on file   Transportation Needs:     Lack of Transportation (Medical): Not on file    Lack of Transportation (Non-Medical): Not on file   Physical Activity:     Days of Exercise per Week: Not on file    Minutes of Exercise per Session: Not on file   Stress:     Feeling of Stress : Not on file   Social Connections:     Frequency of Communication with Friends and Family: Not on file    Frequency of Social Gatherings with Friends and Family: Not on file    Attends Mandaen Services: Not on file    Active Member of [address] [place] [address] [place] or Organizations: Not on file    Attends Club or Organization Meetings: Not on file    Marital Status: Not on file   Intimate Partner Violence:     Fear of Current or Ex-Partner: Not on file    Emotionally Abused: Not on file    Physically Abused: Not on file    Sexually Abused: Not on file   Housing Stability:     Unable to Pay for Housing in the Last Year: Not on file    Number of [place] in the Last Year: Not on file    Unstable Housing in the Last Year: Not on file         ALLERGIES: Patient has no known allergies. Review of Systems   Constitutional: Negative for fatigue and fever. HENT: Negative for sneezing and sore throat. Respiratory: Negative for cough and shortness of breath. Cardiovascular: Negative for chest pain and leg swelling. Gastrointestinal: Negative for abdominal pain, diarrhea, nausea and vomiting. Genitourinary: Positive for dysuria and vaginal discharge. Negative for difficulty urinating, frequency and hematuria. Musculoskeletal: Negative for arthralgias and myalgias. Skin: Negative for color change and rash. Neurological: Negative for weakness and headaches. Psychiatric/Behavioral: Negative for agitation and behavioral problems.        Vitals:    02/20/22 1843   BP: 131/72   Pulse: 70   Resp: 20   Temp: 97.8 °F (36.6 °C)   SpO2: 100%   Weight: 99.8 kg (220 lb)   Height: 5' 8\" (1.727 m)            Physical Exam  Vitals and nursing note reviewed. Exam conducted with a chaperone present. Constitutional:       General: She is not in acute distress. Appearance: Normal appearance. She is well-developed. She is obese. She is not ill-appearing, toxic-appearing or diaphoretic. HENT:      Head: Normocephalic and atraumatic. Nose: Nose normal.      Mouth/Throat:      Mouth: Mucous membranes are moist.      Pharynx: Oropharynx is clear. Eyes:      Extraocular Movements: Extraocular movements intact. Conjunctiva/sclera: Conjunctivae normal.      Pupils: Pupils are equal, round, and reactive to light. Cardiovascular:      Rate and Rhythm: Normal rate and regular rhythm. Pulses: Normal pulses. Heart sounds: Normal heart sounds. Pulmonary:      Effort: Pulmonary effort is normal. No respiratory distress. Breath sounds: Normal breath sounds. No wheezing. Chest:      Chest wall: No tenderness. Abdominal:      General: Abdomen is flat. There is no distension. Palpations: Abdomen is soft. Tenderness: There is no abdominal tenderness. There is no guarding or rebound. Genitourinary:     Exam position: Lithotomy position. Vagina: Vaginal discharge (Thin white discharge) present. Musculoskeletal:         General: No swelling, tenderness, deformity or signs of injury. Normal range of motion. Cervical back: Normal range of motion and neck supple. No rigidity. No muscular tenderness. Right lower leg: No edema. Left lower leg: No edema. Skin:     General: Skin is warm and dry. Capillary Refill: Capillary refill takes less than 2 seconds. Neurological:      General: No focal deficit present. Mental Status: She is alert and oriented to person, place, and time.    Psychiatric:         Mood and Affect: Mood normal.         Behavior: Behavior normal.          MDM  Number of Diagnoses or Management Options  Diagnosis management comments: 45-year-old female presents as above with pelvic pain, dysuria, dyspareunia. With her thin discharge will prescribe treatment for BV, Diflucan x1 after antibiotics are completed should she develop yeast infection. Will send GC/chlamydia.   No evidence of UTI       Amount and/or Complexity of Data Reviewed  Clinical lab tests: reviewed           Procedures

## 2022-02-21 RX ORDER — AMLODIPINE BESYLATE 10 MG/1
TABLET ORAL
Qty: 30 TABLET | Refills: 0 | Status: SHIPPED | OUTPATIENT
Start: 2022-02-21 | End: 2022-03-24

## 2022-02-21 RX ORDER — HYDROCHLOROTHIAZIDE 12.5 MG/1
TABLET ORAL
Qty: 30 TABLET | Refills: 0 | Status: SHIPPED | OUTPATIENT
Start: 2022-02-21 | End: 2022-03-24

## 2022-02-21 NOTE — ED NOTES
Vaginal examination performed by provider. This RN in room as witness. No swabs sent. No irregular discharge noted.

## 2022-02-21 NOTE — DISCHARGE INSTRUCTIONS
Please follow-up with your GYN doctor if you continue to have symptoms after 1 week. Thank you for allowing us to provide you with medical care today. We realize that you have many choices for your emergency care needs. We thank you for choosing [facility]. Please choose us in the future for any continued health care needs. We hope we addressed all of your medical concerns. We strive to provide excellent quality care in the Emergency Department. Anything less than excellent does not meet our expectations. The exam and treatment you received in the Emergency Department were for an emergent problem and are not intended as complete care. It is important that you follow up with a doctor, nurse practitioner, or physician's assistant for ongoing care. If your symptoms worsen or you do not improve as expected and you are unable to reach your usual health care provider, you should return to the Emergency Department. We are available 24 hours a day. Take this sheet with you when you go to your follow-up visit. If you have any problem arranging the follow-up visit, contact the Emergency Department immediately. Make an appointment your family doctor for follow up of this visit. Return to the ER if you are unable to be seen in a timely manner.

## 2022-02-21 NOTE — ED NOTES
Verbal shift change report given to Cammie Salmeron (oncoming nurse) by Mick Fortune (offgoing nurse). Report included the following information SBAR and ED Summary.

## 2022-02-22 ENCOUNTER — PATIENT OUTREACH (OUTPATIENT)
Dept: CASE MANAGEMENT | Age: 22
End: 2022-02-22

## 2022-02-23 NOTE — PROGRESS NOTES
Goals Addressed                 This Visit's Progress     Attends follow up appointments on schedule   On track     10/22/21   Educated on importance of keeping all follow up appointments   Will continue to follow up with Cardiology, Neurology and PCP   Will attend the following appointment as scheduled  o 11/2/2021 2:40 PM o Usha Tamayo MD o [address] [facility] o BS AMB o   11/3/2021 2:00 PM o Calvin Bennett MD o [address]  will follow in 12-14 days. Pmk    11/10/21   Has followed up as scheduled with all appointments   Last follow up was on 11/3/21 with cardiology   Will follow up again in 1 month with cardiology   Will attend the following:  o 11/19/2021 4:00 PM o CELSO CAMPOS o [facility] o BS AMB o   12/20/2021 3:00 PM o Rich Dhaliwal NP o [facility] o BS AMB o   2/2/2022 3:40 PM o Usha Tamayo MD o [facility] will follow again in 12-14 days. Pmk    12/03/21   Patient completed follow up as scheduled on 11/19/21   Waiting for test results to re-schedule follow up   Will attend Cardiology follow up on 12/20/21 pmk    12/22/21   Attended follow up with cardiology on 12/20/21   Reviewed all up coming scheduled visits   Will attend next scheduled cardiology and neurology visits as scheduled.  ACM will follow up again  in 12-15 days. [address] [place] [address]    01/11/22   Has followed up as was scheduled   Reviewed all up coming appointments   Will attend as scheduled.:  o 1/24/2022 4:00 PM o Matt Marítnez NP o [address] o BS AMB o   2/2/2022 3:40 PM o Usha Tamayo MD o [address] [facility] will follow up again  in 12-15 days. [address] [place] [address]      01/27/22   Attended appointment on 1/24/22   Reviewed all scheduled visits   Will follow up with Neuro on 2/2/22.  Pmk    02/11/22      Reminded of up coming appointments   Will attend the following:  o 6/8/2022 3:00 PM (Arrive by 2:50 PM) Fredrick Perez MD o   Will follow up for next PCP and Cardiology   ACM will follow up in 12-14 days. Pmk    02/23/22    Completed follow up with Nurology for her MS, no changes.  Will continue her Aubagio, admits to feeling better since starting the treatment   Has returned back to work   ACM will follow in 12-14 days. pmk            Baseline activity   On track     10/22/21   Will maintain blood pressure somewhere between 90/60 or 120/80    Will continue to take medications as prescribed    Will notify providers if not feeling good or if headaches or dizziness    Will Keep all  doctor appointments as scheduled   Educated on the Importance of Supportive resources in place to maintain patient in the community (ie. Home Health, DME equipment, refer to, medication assistant plan, [facility] etc.)   ACM contact information given. Will follow up in 10-14 days. [address] [place] [address]    11/10/21    Admits to feeling well, denies chest pain or PACs during this episode   Will utilize supportive resources if needed   Continue to take medications as ordered   ACM will follow in 12-14 days. Pmk    12/03/21   Outreach completed, admits to feeling better, denies chest pain   Had follow up with cardiology   Waiting on results of sonogram   Taking medications as ordered   Will maintain blood pressure somewhere between 90/60 or 120/80 and keep log   ACM will follow up again in 12-14 days.  Pmk    12/22/21   Outreach completed, admits to doing better   Results of Sonogram discussed with patient by provider   Continue to have episodes of chest pain and PACs associated with her MS diagnosis   Continue to verbalize stress related to diagnosis   Will continue to utilize conventional treatment for relief of PACs and chest discomfort   Will observe closely changes in condition and notify provider accordingly   ACM will follow up again in 12-14 days. pmk    01/11/22   ED 12/27/21 for chest pain, pain on left lower side, has had 3 Ed visits and 1 admission this year, says her chest pain is on left side. Found lump under arm.  Will continue to  take all medications as ordered   Will continue to observe closely changes in condition and notify provider accordingly   ACM will follow up again in 12-14 days. pmk       01/27/22   No Ed admits in 30 days   Continue to take pain medications as ordered   Will observe closely underarm rash and will notify if changes   ACM will follow up again in 12-14 days. pmk      02/10/22   Attempted outreach   VM left with ACM contact information. Pmk    02/10/22   Inbound Call from patient who says she is doing much better   Abscess resolved under arm with antibiotics   Will continue all medications as ordered   Was reminded that as part of taking the Aubagio the safety and supervision goes to getting and end of month liver function test for 6 straight months   Will be done at the primary care level    ACM will follow up in 12-14 days. Pmk    02/23/22    Ed 2/2022 for Yeast infection, says she was ordered antibiotic and just picked it up   Will take as directed until gone   Reminded of orders to have liver studies done   Patient will have labs  drawn on 3/4/22   Acm will follow again in 12-14 days.  pmk

## 2022-02-28 ENCOUNTER — TELEPHONE (OUTPATIENT)
Dept: NEUROLOGY | Age: 22
End: 2022-02-28

## 2022-02-28 DIAGNOSIS — G35 MS (MULTIPLE SCLEROSIS) (HCC): Primary | ICD-10-CM

## 2022-02-28 NOTE — TELEPHONE ENCOUNTER
Pt called to let us know that she would not like her lab inocencia results mailed to her, instead she would like to pick them up.

## 2022-02-28 NOTE — TELEPHONE ENCOUNTER
Patient's mother calling stating Dr. Jo Cano told patient to get blood work done once a month through PCP. But patient's PCP said since Dr. Jo Cano is the one that prescribed the medication he should be the one to monitor bloodwork.  Patient's mother requesting to  lab order so she can take patient to lab inocencia.

## 2022-03-05 LAB
ALBUMIN SERPL-MCNC: 4.1 G/DL (ref 3.9–5)
ALP SERPL-CCNC: 46 IU/L (ref 42–106)
ALT SERPL-CCNC: 18 IU/L (ref 0–32)
AST SERPL-CCNC: 18 IU/L (ref 0–40)
BILIRUB DIRECT SERPL-MCNC: 0.12 MG/DL (ref 0–0.4)
BILIRUB SERPL-MCNC: 0.4 MG/DL (ref 0–1.2)
PROT SERPL-MCNC: 7.4 G/DL (ref 6–8.5)

## 2022-03-09 ENCOUNTER — PATIENT OUTREACH (OUTPATIENT)
Dept: CASE MANAGEMENT | Age: 22
End: 2022-03-09

## 2022-03-10 ENCOUNTER — PATIENT OUTREACH (OUTPATIENT)
Dept: CASE MANAGEMENT | Age: 22
End: 2022-03-10

## 2022-03-11 NOTE — PROGRESS NOTES
Patient has graduated from the Complex Case Management  program on 03/10/22. Patient/family has the ability to self-manage at this time. Care management goals have been completed. No further Ambulatory Care Manager follow up scheduled. Goals Addressed                 This Visit's Progress     COMPLETED: Attends follow up appointments on schedule   On track     10/22/21   Educated on importance of keeping all follow up appointments   Will continue to follow up with Cardiology, Neurology and PCP   Will attend the following appointment as scheduled  o 11/2/2021 2:40 PM o Kyaw Cline MD o [address] [facility] o BS AMB o   11/3/2021 2:00 PM o Naima Padilla MD o [address]  will follow in 12-14 days. Pmk    11/10/21   Has followed up as scheduled with all appointments   Last follow up was on 11/3/21 with cardiology   Will follow up again in 1 month with cardiology   Will attend the following:  o 11/19/2021 4:00 PM o CELSO CAMPOS o [facility] o BS AMB o   12/20/2021 3:00 PM o Tyrone Garcia NP o [facility] o BS AMB o   2/2/2022 3:40 PM o Kyaw Cline MD o [facility] will follow again in 12-14 days. Pmk    12/03/21   Patient completed follow up as scheduled on 11/19/21   Waiting for test results to re-schedule follow up   Will attend Cardiology follow up on 12/20/21 pmk    12/22/21   Attended follow up with cardiology on 12/20/21   Reviewed all up coming scheduled visits   Will attend next scheduled cardiology and neurology visits as scheduled.  ACM will follow up again  in 12-15 days.  [facility]    01/11/22   Has followed up as was scheduled   Reviewed all up coming appointments   Will attend as scheduled.:  o 1/24/2022 4:00 PM o Andrew Frey NP o [address] o BS AMB o   2/2/2022 3:40 PM o Kyaw Cline MD o [facility] [address] [place] [address] will follow up again  in 12-15 days. [address] [place] [address]      01/27/22   Attended appointment on 1/24/22   Reviewed all scheduled visits   Will follow up with Neuro on 2/2/22. Pmk    02/11/22      Reminded of up coming appointments   Will attend the following:  o 6/8/2022 3:00 PM (Arrive by 2:50 PM) Grey Albert, Stephannie Bumpers, MD o   Will follow up for next PCP and Cardiology   ACM will follow up in 12-14 days. Pmk    02/23/22    Completed follow up with Nurology for her MS, no changes.  Will continue her Aubagio, admits to feeling better since starting the treatment   Has returned back to work   ACM will follow in 12-14 days. pmk    03/09/22    Completed follow ups as scheduled   Blood work completed on 2/28/22 as ordered   Reminded of up coming appointments   Will attend scheduled appointments as scheduled. pmk                 COMPLETED: Baseline activity   On track     10/22/21   Will maintain blood pressure somewhere between 90/60 or 120/80    Will continue to take medications as prescribed    Will notify providers if not feeling good or if headaches or dizziness    Will Keep all  doctor appointments as scheduled   Educated on the Importance of Supportive resources in place to maintain patient in the community (ie. Home Health, DME equipment, refer to, medication assistant plan, [facility] etc.)   ACM contact information given. Will follow up in 10-14 days. [address] [place] [address]    11/10/21    Admits to feeling well, denies chest pain or PACs during this episode   Will utilize supportive resources if needed   Continue to take medications as ordered   ACM will follow in 12-14 days. Pmk    12/03/21   Outreach completed, admits to feeling better, denies chest pain   Had follow up with cardiology   Waiting on results of sonogram   Taking medications as ordered   Will maintain blood pressure somewhere between 90/60 or 120/80 and keep log   ACM will follow up again in 12-14 days. Pmk    12/22/21   Outreach completed, admits to doing better   Results of Sonogram discussed with patient by provider   Continue to have episodes of chest pain and PACs associated with her MS diagnosis   Continue to verbalize stress related to diagnosis   Will continue to utilize conventional treatment for relief of PACs and chest discomfort   Will observe closely changes in condition and notify provider accordingly   ACM will follow up again in 12-14 days. pmk    01/11/22   ED 12/27/21 for chest pain, pain on left lower side, has had 3 Ed visits and 1 admission this year, says her chest pain is on left side. Found lump under arm.  Will continue to  take all medications as ordered   Will continue to observe closely changes in condition and notify provider accordingly   ACM will follow up again in 12-14 days. pmk       01/27/22   No Ed admits in 30 days   Continue to take pain medications as ordered   Will observe closely underarm rash and will notify if changes   ACM will follow up again in 12-14 days. pmk      02/10/22   Attempted outreach   VM left with ACM contact information. Pmk    02/10/22   Inbound Call from patient who says she is doing much better   Abscess resolved under arm with antibiotics   Will continue all medications as ordered   Was reminded that as part of taking the Aubagio the safety and supervision goes to getting and end of month liver function test for 6 straight months   Will be done at the primary care level    ACM will follow up in 12-14 days. Pmk    02/23/22    Ed 2/2022 for Yeast infection, says she was ordered antibiotic and just picked it up   Will take as directed until gone   Reminded of orders to have liver studies done   Patient will have labs  drawn on 3/4/22   Acm will follow again in 12-14 days.  pmk    03/09/22    Labs was drawn as ordered   Admits to doing well, denies yeast infection   Taking medications as ordered   ACM will follow in 12-14 days. pmk            Patient has Ambulatory Care Manager's contact information for any further questions, concerns, or needs.   Patients upcoming visits:    Future Appointments   Date Time Provider Anusha Dominguez   6/8/2022  3:00 PM Sharee Kay MD [facility] BS AMB

## 2022-03-18 PROBLEM — H54.7 VISION LOSS: Status: ACTIVE | Noted: 2021-02-12

## 2022-03-18 PROBLEM — G37.9 DEMYELINATING DISEASE (HCC): Status: ACTIVE | Noted: 2021-02-12

## 2022-03-18 PROBLEM — R73.03 PREDIABETES: Status: ACTIVE | Noted: 2017-06-08

## 2022-03-19 PROBLEM — E66.01 SEVERE OBESITY (HCC): Status: ACTIVE | Noted: 2020-02-07

## 2022-03-20 PROBLEM — I10 HTN (HYPERTENSION): Status: ACTIVE | Noted: 2021-02-12

## 2022-03-23 ENCOUNTER — PATIENT OUTREACH (OUTPATIENT)
Dept: CASE MANAGEMENT | Age: 22
End: 2022-03-23

## 2022-03-24 ENCOUNTER — TELEPHONE (OUTPATIENT)
Dept: NEUROLOGY | Age: 22
End: 2022-03-24

## 2022-03-24 DIAGNOSIS — G35 MULTIPLE SCLEROSIS (HCC): Primary | ICD-10-CM

## 2022-03-24 DIAGNOSIS — G35 MS (MULTIPLE SCLEROSIS) (HCC): ICD-10-CM

## 2022-03-24 RX ORDER — HYDROCHLOROTHIAZIDE 12.5 MG/1
TABLET ORAL
Qty: 30 TABLET | Refills: 0 | Status: SHIPPED | OUTPATIENT
Start: 2022-03-24 | End: 2022-04-20

## 2022-03-24 RX ORDER — AMLODIPINE BESYLATE 10 MG/1
TABLET ORAL
Qty: 30 TABLET | Refills: 0 | Status: SHIPPED | OUTPATIENT
Start: 2022-03-24 | End: 2022-04-20

## 2022-03-24 NOTE — TELEPHONE ENCOUNTER
Patients mom called stating the Funmilayo Iqbal needs her paperwork to state Routine schedule for every month. Please call mom.

## 2022-04-05 LAB
ALBUMIN SERPL-MCNC: 4.3 G/DL (ref 3.9–5)
ALP SERPL-CCNC: 47 IU/L (ref 42–106)
ALT SERPL-CCNC: 16 IU/L (ref 0–32)
AST SERPL-CCNC: 20 IU/L (ref 0–40)
BILIRUB DIRECT SERPL-MCNC: 0.13 MG/DL (ref 0–0.4)
BILIRUB SERPL-MCNC: 0.4 MG/DL (ref 0–1.2)
PROT SERPL-MCNC: 7.1 G/DL (ref 6–8.5)

## 2022-04-20 RX ORDER — HYDROCHLOROTHIAZIDE 12.5 MG/1
TABLET ORAL
Qty: 30 TABLET | Refills: 0 | Status: SHIPPED | OUTPATIENT
Start: 2022-04-20 | End: 2022-05-23

## 2022-04-20 RX ORDER — AMLODIPINE BESYLATE 10 MG/1
TABLET ORAL
Qty: 30 TABLET | Refills: 0 | Status: SHIPPED | OUTPATIENT
Start: 2022-04-20 | End: 2022-05-23

## 2022-05-04 ENCOUNTER — TELEPHONE (OUTPATIENT)
Dept: NEUROLOGY | Age: 22
End: 2022-05-04

## 2022-05-23 RX ORDER — HYDROCHLOROTHIAZIDE 12.5 MG/1
TABLET ORAL
Qty: 30 TABLET | Refills: 0 | Status: SHIPPED | OUTPATIENT
Start: 2022-05-23 | End: 2022-06-22

## 2022-05-23 RX ORDER — AMLODIPINE BESYLATE 10 MG/1
TABLET ORAL
Qty: 30 TABLET | Refills: 0 | Status: SHIPPED | OUTPATIENT
Start: 2022-05-23 | End: 2022-06-22

## 2022-06-03 LAB
ALBUMIN SERPL-MCNC: 4.1 G/DL (ref 3.9–5)
ALP SERPL-CCNC: 43 IU/L (ref 42–106)
ALT SERPL-CCNC: 13 IU/L (ref 0–32)
AST SERPL-CCNC: 9 IU/L (ref 0–40)
BILIRUB DIRECT SERPL-MCNC: 0.18 MG/DL (ref 0–0.4)
BILIRUB SERPL-MCNC: 0.8 MG/DL (ref 0–1.2)
PROT SERPL-MCNC: 6.7 G/DL (ref 6–8.5)

## 2022-06-08 ENCOUNTER — OFFICE VISIT (OUTPATIENT)
Dept: NEUROLOGY | Age: 22
End: 2022-06-08
Payer: COMMERCIAL

## 2022-06-08 VITALS
BODY MASS INDEX: 30.87 KG/M2 | SYSTOLIC BLOOD PRESSURE: 130 MMHG | RESPIRATION RATE: 15 BRPM | WEIGHT: 203 LBS | DIASTOLIC BLOOD PRESSURE: 84 MMHG | HEART RATE: 67 BPM | OXYGEN SATURATION: 99 %

## 2022-06-08 DIAGNOSIS — G35 MS (MULTIPLE SCLEROSIS) (HCC): Primary | ICD-10-CM

## 2022-06-08 PROCEDURE — 99214 OFFICE O/P EST MOD 30 MIN: CPT | Performed by: SPECIALIST

## 2022-06-08 NOTE — LETTER
6/8/2022    Patient: Osvaldo Magallanes   YOB: 2000   Date of Visit: 6/8/2022     Toma Canavan, [address] [address]  [place]    Dear Toma Canavan, MD,      Thank you for referring Ms. Isabella Tao to [facility] for evaluation. My notes for this consultation are attached. If you have questions, please do not hesitate to call me. I look forward to following your patient along with you.       Sincerely,    Jeffrey Farrell MD

## 2022-06-08 NOTE — PATIENT INSTRUCTIONS
For the time being we will recommend discontinuation of the Aubagio although I do not recall the case with these kind of side effects previously. Liver function tests as recently as this month were all normal.  We will do updated imaging of head neck and mid back. Revisit pended.

## 2022-06-08 NOTE — PROGRESS NOTES
since started when [place]     Started having numbness in feet and fingers, having pins and needles     Having pains in chest, lasting a couple mins, causes difficulty breathing, pain left side   Having pains on right ribs   Mainly happens at night time     Walking every now and then will feel off balance, dizzy and nausea  Feels woozy     Had an episode where pt woke up vomiting and sever chest and rib pains, felt like couldn't swallow, compares to a cramp

## 2022-06-08 NOTE — PROGRESS NOTES
Neurology Consult      Subjective:      Cori Garcia is a 22 y.o. female who comes in today with her mom. Known patient with MS on Aubagio. This started back in February and we have done consistent liver function tests which have been unrevealing so far including this month. Did say in recent times she has noticed some sensibility changes in the chest and lower extremities and some nausea and vomiting type episodes. No recent history of trauma rash chills or fever. No history of diabetes. Blood pressure is managed and has not been an issue. Cognition is good. Special sensory function otherwise intact. Does not do street drugs. Bowel and bladder function otherwise intact. Current Outpatient Medications   Medication Sig Dispense Refill    hydroCHLOROthiazide (HYDRODIURIL) 12.5 mg tablet Take 1 tablet by mouth once daily 30 Tablet 0    amLODIPine (NORVASC) 10 mg tablet Take 1 tablet by mouth once daily 30 Tablet 0    Aubagio 14 mg tablet TAKE 1 TABLET BY MOUTH 1 TIME A DAY. 90 Tablet 0    fluconazole (Diflucan) 150 mg tablet Take 1 tablet after antibiotics are completed for yeast infection 1 Tablet 0    norethindrone-e estradiol-iron (LOESTRIN FE) 1 mg-20 mcg (24)/75 mg (4) tab Take 1 Tablet by mouth daily. (Patient not taking: Reported on 6/8/2022) 3 Dose Pack 3      No Known Allergies  Past Medical History:   Diagnosis Date    [facility]      No past surgical history on file.    Social History     Socioeconomic History    Marital status: SINGLE     Spouse name: Not on file    Number of children: Not on file    Years of education: Not on file    Highest education level: Not on file   Occupational History    Not on file   Tobacco Use    Smoking status: Passive Smoke Exposure - Never Smoker    Smokeless tobacco: Never Used   Vaping Use    Vaping Use: Never used   Substance and Sexual Activity    Alcohol use: No    Drug use: No    Sexual activity: Never Other Topics Concern    Not on file   Social History Narrative    Not on file     Social Determinants of Health     Financial Resource Strain:     Difficulty of Paying Living Expenses: Not on file   Food Insecurity:     Worried About Running Out of Food in the Last Year: Not on file    Nestor of Food in the Last Year: Not on file   Transportation Needs:     Lack of Transportation (Medical): Not on file    Lack of Transportation (Non-Medical): Not on file   Physical Activity:     Days of Exercise per Week: Not on file    Minutes of Exercise per Session: Not on file   Stress:     Feeling of Stress : Not on file   Social Connections:     Frequency of Communication with Friends and Family: Not on file    Frequency of Social Gatherings with Friends and Family: Not on file    Attends Religion Services: Not on file    Active Member of [address] [place] [address] or Organizations: Not on file    Attends Club or Organization Meetings: Not on file    Marital Status: Not on file   Intimate Partner Violence:     Fear of Current or Ex-Partner: Not on file    Emotionally Abused: Not on file    Physically Abused: Not on file    Sexually Abused: Not on file   Housing Stability:     Unable to Pay for Housing in the Last Year: Not on file    Number of [place] in the Last Year: Not on file    Unstable Housing in the Last Year: Not on file      Family History   Problem Relation Age of Onset    Hypertension Mother     Hypertension Father     Elevated Lipids Father     Cancer Paternal Grandmother     Diabetes Maternal Grandmother       Visit Vitals  /84 (BP 1 Location: Left arm, BP Patient Position: Sitting, BP Cuff Size: Adult)   Pulse 67   Resp 15   Wt 92.1 kg (203 lb)   SpO2 99%   BMI 30.87 kg/m²        Review of Systems:   A comprehensive review of systems was negative except for that written in the HPI. Neuro Exam:     Appearance:   The patient is well developed, well nourished, provides a coherent history and is in no acute distress. Mental Status: Oriented to time, place and person. Mood and affect appropriate. Cranial Nerves:   Intact visual fields. Fundi are benign. ALEX, EOM's full, no nystagmus, no ptosis. Facial sensation is normal. Corneal reflexes are intact. Facial movement is symmetric. Hearing is normal bilaterally. Palate is midline with normal sternocleidomastoid and trapezius muscles are normal. Tongue is midline. Motor:  5/5 strength in upper and lower proximal and distal muscles. Normal bulk and tone. No fasciculations. Reflexes:   Deep tendon reflexes 2+/4 and symmetrical.   Sensory:   Normal to touch, pinprick and vibration. Gait:  Normal gait. Tremor:   No tremor noted. Cerebellar:  No cerebellar signs present. Neurovascular:  Normal heart sounds and regular rhythm, peripheral pulses intact, and no carotid bruits. Toes downgoing no clonus no Busby's. Straight leg raising negative at 90 degrees except for discomfort in the thigh areas. Assessment:   MS.  For the time being we will asked the patient to stop the Aubagio. We will proceed with testing of the head neck and thoracic areas by MRI. Lab work for liver function test has been consistently normal since the screening process started in February. Plan:   Revisit pended.   Signed by :  Vann Apley MD

## 2022-06-22 RX ORDER — AMLODIPINE BESYLATE 10 MG/1
TABLET ORAL
Qty: 30 TABLET | Refills: 0 | Status: SHIPPED | OUTPATIENT
Start: 2022-06-22 | End: 2022-07-19

## 2022-06-22 RX ORDER — HYDROCHLOROTHIAZIDE 12.5 MG/1
TABLET ORAL
Qty: 30 TABLET | Refills: 0 | Status: SHIPPED | OUTPATIENT
Start: 2022-06-22 | End: 2022-07-19

## 2022-07-06 LAB
ALBUMIN SERPL-MCNC: 4.2 G/DL (ref 3.9–5)
ALP SERPL-CCNC: 42 IU/L (ref 42–106)
ALT SERPL-CCNC: 12 IU/L (ref 0–32)
AST SERPL-CCNC: 14 IU/L (ref 0–40)
BILIRUB DIRECT SERPL-MCNC: <0.1 MG/DL (ref 0–0.4)
BILIRUB SERPL-MCNC: 0.3 MG/DL (ref 0–1.2)
PROT SERPL-MCNC: 6.7 G/DL (ref 6–8.5)

## 2022-07-19 RX ORDER — AMLODIPINE BESYLATE 10 MG/1
TABLET ORAL
Qty: 30 TABLET | Refills: 0 | Status: SHIPPED | OUTPATIENT
Start: 2022-07-19 | End: 2022-08-17

## 2022-07-19 RX ORDER — HYDROCHLOROTHIAZIDE 12.5 MG/1
TABLET ORAL
Qty: 30 TABLET | Refills: 0 | Status: SHIPPED | OUTPATIENT
Start: 2022-07-19 | End: 2022-08-17

## 2022-08-09 LAB
ALBUMIN SERPL-MCNC: 4.3 G/DL (ref 3.9–5)
ALP SERPL-CCNC: 46 IU/L (ref 42–106)
ALT SERPL-CCNC: 13 IU/L (ref 0–32)
AST SERPL-CCNC: 16 IU/L (ref 0–40)
BILIRUB DIRECT SERPL-MCNC: 0.14 MG/DL (ref 0–0.4)
BILIRUB SERPL-MCNC: 0.6 MG/DL (ref 0–1.2)
PROT SERPL-MCNC: 7.3 G/DL (ref 6–8.5)

## 2022-08-17 RX ORDER — AMLODIPINE BESYLATE 10 MG/1
TABLET ORAL
Qty: 30 TABLET | Refills: 0 | Status: SHIPPED | OUTPATIENT
Start: 2022-08-17 | End: 2022-09-14

## 2022-08-17 RX ORDER — HYDROCHLOROTHIAZIDE 12.5 MG/1
TABLET ORAL
Qty: 30 TABLET | Refills: 0 | Status: SHIPPED | OUTPATIENT
Start: 2022-08-17 | End: 2022-09-14

## 2022-09-04 ENCOUNTER — HOSPITAL ENCOUNTER (EMERGENCY)
Age: 22
Discharge: HOME | End: 2022-09-04
Attending: EMERGENCY MEDICINE
Payer: COMMERCIAL

## 2022-09-04 VITALS
WEIGHT: 205 LBS | TEMPERATURE: 98.9 F | RESPIRATION RATE: 18 BRPM | HEIGHT: 69 IN | HEART RATE: 84 BPM | DIASTOLIC BLOOD PRESSURE: 83 MMHG | BODY MASS INDEX: 30.36 KG/M2 | OXYGEN SATURATION: 100 % | SYSTOLIC BLOOD PRESSURE: 120 MMHG

## 2022-09-04 DIAGNOSIS — N30.90 CYSTITIS: ICD-10-CM

## 2022-09-04 DIAGNOSIS — R21 RASH OF GENITALIA: Primary | ICD-10-CM

## 2022-09-04 LAB
APPEARANCE UR: CLEAR
BACTERIA URNS QL MICRO: ABNORMAL /HPF
BILIRUB UR QL: NEGATIVE
COLOR UR: ABNORMAL
EPITH CASTS URNS QL MICRO: ABNORMAL /LPF
GLUCOSE UR STRIP.AUTO-MCNC: NEGATIVE MG/DL
HGB UR QL STRIP: ABNORMAL
KETONES UR QL STRIP.AUTO: ABNORMAL MG/DL
LEUKOCYTE ESTERASE UR QL STRIP.AUTO: ABNORMAL
NITRITE UR QL STRIP.AUTO: NEGATIVE
PH UR STRIP: 5.5 [PH] (ref 5–8)
PROT UR STRIP-MCNC: 30 MG/DL
RBC #/AREA URNS HPF: ABNORMAL /HPF (ref 0–5)
SP GR UR REFRACTOMETRY: 1.03 (ref 1–1.03)
UA: UC IF INDICATED,UAUC: ABNORMAL
UROBILINOGEN UR QL STRIP.AUTO: 1 EU/DL (ref 0.2–1)
WBC URNS QL MICRO: ABNORMAL /HPF (ref 0–4)

## 2022-09-04 PROCEDURE — 87491 CHLMYD TRACH DNA AMP PROBE: CPT

## 2022-09-04 PROCEDURE — 36415 COLL VENOUS BLD VENIPUNCTURE: CPT

## 2022-09-04 PROCEDURE — 74011000250 HC RX REV CODE- 250: Performed by: EMERGENCY MEDICINE

## 2022-09-04 PROCEDURE — 99284 EMERGENCY DEPT VISIT MOD MDM: CPT

## 2022-09-04 PROCEDURE — 81001 URINALYSIS AUTO W/SCOPE: CPT

## 2022-09-04 PROCEDURE — 87086 URINE CULTURE/COLONY COUNT: CPT

## 2022-09-04 PROCEDURE — 96372 THER/PROPH/DIAG INJ SC/IM: CPT

## 2022-09-04 PROCEDURE — 74011250636 HC RX REV CODE- 250/636: Performed by: EMERGENCY MEDICINE

## 2022-09-04 RX ORDER — DOXYCYCLINE 100 MG/1
100 TABLET ORAL 2 TIMES DAILY
Qty: 14 TABLET | Refills: 0 | Status: SHIPPED | OUTPATIENT
Start: 2022-09-04 | End: 2022-09-11

## 2022-09-04 RX ORDER — VALACYCLOVIR HYDROCHLORIDE 1 G/1
1000 TABLET, FILM COATED ORAL 3 TIMES DAILY
Qty: 21 TABLET | Refills: 0 | Status: SHIPPED | OUTPATIENT
Start: 2022-09-04 | End: 2022-09-11

## 2022-09-04 RX ADMIN — LIDOCAINE HYDROCHLORIDE 0.5 G: 10 INJECTION, SOLUTION EPIDURAL; INFILTRATION; INTRACAUDAL; PERINEURAL at 21:58

## 2022-09-04 NOTE — ED TRIAGE NOTES
Pt presents to ED c/o abscess on vagina that she first noticed on Friday night. Endorses pain and drainage. Denies new sexual partners.  Has not been tested for STD's in the past.

## 2022-09-04 NOTE — ED PROVIDER NOTES
79-year-old female with history of MS, hypertension presents to the emergency department with a chief complaint of painful vaginal lesions which appeared 2 days ago. No history of STI. She is in a monogamous relationship with her boyfriend for the past 5 years. No vaginal discharge. She complains of some dysuria. No abdominal pain. The history is provided by the patient and medical records. Vaginal Pain   This is a new problem. The current episode started 2 days ago. The problem occurs constantly. The problem has been gradually worsening. The discharge occurs After urination. She is not pregnant. Associated symptoms include dysuria and genital lesions. Pertinent negatives include no fever, no abdominal swelling, no abdominal pain, no diarrhea, no nausea, no vomiting and no frequency. She has tried nothing for the symptoms. Past Medical History:   Diagnosis Date    [facility] ([facility] 02/10/2021       No past surgical history on file.       Family History:   Problem Relation Age of Onset    Hypertension Mother     Hypertension Father     Elevated Lipids Father     Cancer Paternal Grandmother     Diabetes Maternal Grandmother        Social History     Socioeconomic History    Marital status: SINGLE     Spouse name: Not on file    Number of children: Not on file    Years of education: Not on file    Highest education level: Not on file   Occupational History    Not on file   Tobacco Use    Smoking status: Passive Smoke Exposure - Never Smoker    Smokeless tobacco: Never   Vaping Use    Vaping Use: Never used   Substance and Sexual Activity    Alcohol use: No    Drug use: No    Sexual activity: Never   Other Topics Concern    Not on file   Social History Narrative    Not on file     Social Determinants of Health     Financial Resource Strain: Not on file   Food Insecurity: Not on file   Transportation Needs: Not on file   Physical Activity: Not on file   Stress: Not on file   Social Connections: Not on file   Intimate Partner Violence: Not on file   Housing Stability: Not on file         ALLERGIES: Patient has no known allergies. Review of Systems   Constitutional:  Negative for fatigue and fever. HENT:  Negative for sneezing and sore throat. Respiratory:  Negative for cough and shortness of breath. Cardiovascular:  Negative for chest pain and leg swelling. Gastrointestinal:  Negative for abdominal pain, diarrhea, nausea and vomiting. Genitourinary:  Positive for dysuria. Negative for difficulty urinating and frequency. Musculoskeletal:  Negative for arthralgias and myalgias. Skin:  Negative for color change and rash. Neurological:  Negative for weakness and headaches. Psychiatric/Behavioral:  Negative for agitation and behavioral problems. Vitals:    09/04/22 1926 09/04/22 1928   BP: 120/83    Pulse:  84   Resp: 18    Temp: 98.9 °F (37.2 °C)    SpO2: 100%    Weight: 93 kg (205 lb)    Height: 5' 9\" (1.753 m)             Physical Exam  Vitals and nursing note reviewed. Exam conducted with a chaperone present. Constitutional:       General: She is not in acute distress. Appearance: Normal appearance. She is well-developed. She is not ill-appearing, toxic-appearing or diaphoretic. HENT:      Head: Normocephalic and atraumatic. Nose: Nose normal.      Mouth/Throat:      Mouth: Mucous membranes are moist.      Pharynx: Oropharynx is clear. Eyes:      Extraocular Movements: Extraocular movements intact. Conjunctiva/sclera: Conjunctivae normal.      Pupils: Pupils are equal, round, and reactive to light. Cardiovascular:      Rate and Rhythm: Normal rate and regular rhythm. Pulses: Normal pulses. Heart sounds: Normal heart sounds. Pulmonary:      Effort: Pulmonary effort is normal. No respiratory distress. Breath sounds: Normal breath sounds. No wheezing. Chest:      Chest wall: No tenderness. Abdominal:      General: Abdomen is flat.  There is no distension. Palpations: Abdomen is soft. Tenderness: There is no abdominal tenderness. There is no guarding or rebound. Genitourinary:     Comments: Scattered papules with whiteheads across the vulva, not grouped, no erythematous base, no abscess, no cellulitis  Musculoskeletal:         General: No swelling, tenderness, deformity or signs of injury. Normal range of motion. Cervical back: Normal range of motion and neck supple. No rigidity. No muscular tenderness. Right lower leg: No edema. Left lower leg: No edema. Skin:     General: Skin is warm and dry. Capillary Refill: Capillary refill takes less than 2 seconds. Neurological:      General: No focal deficit present. Mental Status: She is alert and oriented to person, place, and time. Psychiatric:         Mood and Affect: Mood normal.         Behavior: Behavior normal.        MDM  Number of Diagnoses or Management Options  Rash of genitalia  Diagnosis management comments: 20-year-old female presents as above with concern for lesions about her vulva. She has scattered papules which are not grouped and most are not on erythematous bases. These could represent HSV but did not appear classic for HSV infection. We will send urine GC/chlamydia, treat for potential STI. Recommend patient follow-up with gynecology. Some evidence of urinary tract infection, antibiotics given today and prescribed should cover this.        Amount and/or Complexity of Data Reviewed  Clinical lab tests: reviewed           Procedures

## 2022-09-05 NOTE — DISCHARGE INSTRUCTIONS
We will send testing today for certain infections, we will call you if these results are abnormal.  We recommend establishing care with a gynecologist for routine well woman care. Regular birth control measures such as oral contraceptive pills are not effective while taking certain antibiotics. We recommend using alternative forms of birth control or abstinence while taking antibiotics and for 1 week after cessation of antibiotics. Thank you for allowing us to provide you with medical care today. We realize that you have many choices for your emergency care needs. We thank you for choosing [employer]. Please choose us in the future for any continued health care needs. We hope we addressed all of your medical concerns. We strive to provide excellent quality care in the Emergency Department. Anything less than excellent does not meet our expectations. The exam and treatment you received in the Emergency Department were for an emergent problem and are not intended as complete care. It is important that you follow up with a doctor, nurse practitioner, or physician's assistant for ongoing care. If your symptoms worsen or you do not improve as expected and you are unable to reach your usual health care provider, you should return to the Emergency Department. We are available 24 hours a day. Take this sheet with you when you go to your follow-up visit. If you have any problem arranging the follow-up visit, contact the Emergency Department immediately. Make an appointment your family doctor for follow up of this visit. Return to the ER if you are unable to be seen in a timely manner.

## 2022-09-06 LAB
BACTERIA SPEC CULT: NORMAL
SERVICE CMNT-IMP: NORMAL

## 2022-09-14 RX ORDER — AMLODIPINE BESYLATE 10 MG/1
TABLET ORAL
Qty: 30 TABLET | Refills: 0 | Status: SHIPPED | OUTPATIENT
Start: 2022-09-14 | End: 2022-10-18

## 2022-09-14 RX ORDER — HYDROCHLOROTHIAZIDE 12.5 MG/1
TABLET ORAL
Qty: 30 TABLET | Refills: 0 | Status: SHIPPED | OUTPATIENT
Start: 2022-09-14 | End: 2022-10-18 | Stop reason: SDUPTHER

## 2022-10-05 RX ORDER — TERIFLUNOMIDE 14 MG/1
TABLET, FILM COATED ORAL
Qty: 90 TABLET | Refills: 0 | Status: SHIPPED | OUTPATIENT
Start: 2022-10-05

## 2022-10-18 RX ORDER — AMLODIPINE BESYLATE 10 MG/1
TABLET ORAL
Qty: 30 TABLET | Refills: 0 | Status: SHIPPED | OUTPATIENT
Start: 2022-10-18

## 2022-10-18 RX ORDER — HYDROCHLOROTHIAZIDE 12.5 MG/1
12.5 TABLET ORAL DAILY
Qty: 30 TABLET | Refills: 0 | Status: SHIPPED | OUTPATIENT
Start: 2022-10-18

## 2022-11-15 RX ORDER — AMLODIPINE BESYLATE 10 MG/1
TABLET ORAL
Qty: 30 TABLET | Refills: 0 | Status: SHIPPED | OUTPATIENT
Start: 2022-11-15

## 2022-11-15 RX ORDER — HYDROCHLOROTHIAZIDE 12.5 MG/1
TABLET ORAL
Qty: 30 TABLET | Refills: 0 | Status: SHIPPED | OUTPATIENT
Start: 2022-11-15

## 2023-01-06 ENCOUNTER — HOSPITAL ENCOUNTER (EMERGENCY)
Age: 23
Discharge: HOME | End: 2023-01-06
Attending: EMERGENCY MEDICINE
Payer: COMMERCIAL

## 2023-01-06 VITALS
RESPIRATION RATE: 16 BRPM | HEART RATE: 80 BPM | DIASTOLIC BLOOD PRESSURE: 89 MMHG | WEIGHT: 205 LBS | OXYGEN SATURATION: 98 % | HEIGHT: 69 IN | TEMPERATURE: 98.1 F | BODY MASS INDEX: 30.36 KG/M2 | SYSTOLIC BLOOD PRESSURE: 141 MMHG

## 2023-01-06 DIAGNOSIS — R10.9 SIDE PAIN: Primary | ICD-10-CM

## 2023-01-06 LAB
APPEARANCE UR: CLEAR
BILIRUB UR QL: NEGATIVE
COLOR UR: NORMAL
GLUCOSE UR STRIP.AUTO-MCNC: NEGATIVE MG/DL
HCG UR QL: NEGATIVE
HGB UR QL STRIP: NEGATIVE
KETONES UR QL STRIP.AUTO: NEGATIVE MG/DL
LEUKOCYTE ESTERASE UR QL STRIP.AUTO: NEGATIVE
NITRITE UR QL STRIP.AUTO: NEGATIVE
PH UR STRIP: 7.5 [PH] (ref 5–8)
PROT UR STRIP-MCNC: NEGATIVE MG/DL
SP GR UR REFRACTOMETRY: 1.01 (ref 1–1.03)
UROBILINOGEN UR QL STRIP.AUTO: 0.2 EU/DL (ref 0.2–1)

## 2023-01-06 PROCEDURE — 81003 URINALYSIS AUTO W/O SCOPE: CPT

## 2023-01-06 PROCEDURE — 99283 EMERGENCY DEPT VISIT LOW MDM: CPT

## 2023-01-06 NOTE — ED TRIAGE NOTES
Pt presents to the ED with left flank pain that started approx 2 hours ago. Endorses increased urination. Denies burning when urinating or blood in urine. Denies N/V    Hx MS and HTN.

## 2023-01-06 NOTE — ED PROVIDER NOTES
Patient is a 23-year-old female with history of multiple sclerosis who presents with left side pain. Patient reports that she had a headache, took Tylenol and her left-sided pain began. She reported that it hurt when she took a deep breath, she got scared and called EMS. At time of my evaluation, she is symptom-free and says she would like to be discharged. She has no other complaints. Speaking full sentences in no distress. Past Medical History:   Diagnosis Date    MS (multiple sclerosis) ([facility] 02/10/2021       No past surgical history on file. Family History:   Problem Relation Age of Onset    Hypertension Mother     Hypertension Father     Elevated Lipids Father     Cancer Paternal Grandmother     Diabetes Maternal Grandmother        Social History     Socioeconomic History    Marital status: SINGLE     Spouse name: Not on file    Number of children: Not on file    Years of education: Not on file    Highest education level: Not on file   Occupational History    Not on file   Tobacco Use    Smoking status: Passive Smoke Exposure - Never Smoker    Smokeless tobacco: Never   Vaping Use    Vaping Use: Never used   Substance and Sexual Activity    Alcohol use: No    Drug use: No    Sexual activity: Never   Other Topics Concern    Not on file   Social History Narrative    Not on file     Social Determinants of Health     Financial Resource Strain: Not on file   Food Insecurity: Not on file   Transportation Needs: Not on file   Physical Activity: Not on file   Stress: Not on file   Social Connections: Not on file   Intimate Partner Violence: Not on file   Housing Stability: Not on file         ALLERGIES: Patient has no known allergies. Review of Systems   Constitutional:  Negative for chills and fever. HENT:  Negative for drooling and nosebleeds. Eyes:  Negative for pain and itching. Respiratory:  Negative for choking and stridor. Cardiovascular:  Negative for leg swelling. Gastrointestinal:  Negative for abdominal distention and rectal pain. Endocrine: Negative for heat intolerance and polyphagia. Genitourinary:  Negative for enuresis and genital sores. Musculoskeletal:  Negative for arthralgias and joint swelling. Skin:  Negative for color change. Allergic/Immunologic: Negative for immunocompromised state. Neurological:  Negative for tremors and speech difficulty. Hematological:  Negative for adenopathy. Psychiatric/Behavioral:  Negative for dysphoric mood and sleep disturbance. Vitals:    01/06/23 0031 01/06/23 0045   BP: (!) 148/95 (!) 141/89   Pulse: 82 80   Resp: 18 16   Temp: 98.1 °F (36.7 °C)    SpO2: 98% 98%   Weight: 93 kg (205 lb)    Height: 5' 9\" (1.753 m)             Physical Exam  Vitals and nursing note reviewed. Constitutional:       General: She is not in acute distress. Appearance: She is well-developed. She is not ill-appearing, toxic-appearing or diaphoretic. HENT:      Head: Normocephalic. Nose: Nose normal.   Eyes:      Conjunctiva/sclera: Conjunctivae normal.   Cardiovascular:      Rate and Rhythm: Normal rate and regular rhythm. Pulmonary:      Effort: Pulmonary effort is normal. No respiratory distress. Breath sounds: Normal breath sounds. Abdominal:      General: There is no distension. Palpations: Abdomen is soft. Tenderness: There is no abdominal tenderness. There is no right CVA tenderness or left CVA tenderness. Musculoskeletal:         General: No deformity. Normal range of motion. Cervical back: Normal range of motion and neck supple. Skin:     General: Skin is warm and dry. Neurological:      Mental Status: She is alert and oriented to person, place, and time. Coordination: Coordination normal.   Psychiatric:         Behavior: Behavior normal.        Medical Decision Making  Pt is symptom-free at time of evaluation.   She is hemodynamically stable, resting in no distress, vital signs stable. She is stable for discharge. Amount and/or Complexity of Data Reviewed  Labs: ordered. Procedures    Patient's results have been reviewed with them. Patient and/or family have verbally conveyed their understanding and agreement of the patient's signs, symptoms, diagnosis, treatment and prognosis and additionally agree to follow up as recommended or return to the Emergency Room should their condition change prior to follow-up. Discharge instructions have also been provided to the patient with some educational information regarding their diagnosis as well a list of reasons why they would want to return to the ER prior to their follow-up appointment should their condition change.

## 2023-01-26 ENCOUNTER — TELEPHONE (OUTPATIENT)
Dept: NEUROLOGY | Age: 23
End: 2023-01-26

## 2023-01-27 ENCOUNTER — TELEPHONE (OUTPATIENT)
Dept: NEUROLOGY | Age: 23
End: 2023-01-27

## 2023-01-30 NOTE — TELEPHONE ENCOUNTER
RE:Aubagio  Key: 2 [facility], [place], operated by [facility] notification via CMM that medication       Available without authorization      Nurse notified

## 2023-03-09 RX ORDER — AMLODIPINE BESYLATE 10 MG/1
10 TABLET ORAL DAILY
Qty: 30 TABLET | Refills: 2 | OUTPATIENT
Start: 2023-03-09

## 2023-03-09 RX ORDER — HYDROCHLOROTHIAZIDE 12.5 MG/1
12.5 TABLET ORAL DAILY
Qty: 30 TABLET | Refills: 2 | OUTPATIENT
Start: 2023-03-09

## 2023-03-13 RX ORDER — AMLODIPINE BESYLATE 10 MG/1
10 TABLET ORAL DAILY
Qty: 30 TABLET | Refills: 2 | Status: SHIPPED | OUTPATIENT
Start: 2023-03-13

## 2023-03-13 RX ORDER — HYDROCHLOROTHIAZIDE 12.5 MG/1
12.5 TABLET ORAL DAILY
Qty: 30 TABLET | Refills: 2 | Status: SHIPPED | OUTPATIENT
Start: 2023-03-13

## 2023-03-22 ENCOUNTER — OFFICE VISIT (OUTPATIENT)
Dept: FAMILY MEDICINE CLINIC | Age: 23
End: 2023-03-22
Payer: COMMERCIAL

## 2023-03-22 VITALS
SYSTOLIC BLOOD PRESSURE: 126 MMHG | HEART RATE: 81 BPM | WEIGHT: 225.8 LBS | TEMPERATURE: 98.3 F | HEIGHT: 69 IN | OXYGEN SATURATION: 98 % | DIASTOLIC BLOOD PRESSURE: 80 MMHG | BODY MASS INDEX: 33.44 KG/M2 | RESPIRATION RATE: 18 BRPM

## 2023-03-22 DIAGNOSIS — I10 HYPERTENSION, UNSPECIFIED TYPE: Primary | ICD-10-CM

## 2023-03-22 DIAGNOSIS — G35 MS (MULTIPLE SCLEROSIS) (HCC): ICD-10-CM

## 2023-03-22 DIAGNOSIS — R73.03 PREDIABETES: ICD-10-CM

## 2023-03-22 PROCEDURE — 3078F DIAST BP <80 MM HG: CPT | Performed by: NURSE PRACTITIONER

## 2023-03-22 PROCEDURE — 99214 OFFICE O/P EST MOD 30 MIN: CPT | Performed by: NURSE PRACTITIONER

## 2023-03-22 PROCEDURE — 3074F SYST BP LT 130 MM HG: CPT | Performed by: NURSE PRACTITIONER

## 2023-03-22 NOTE — PROGRESS NOTES
Jose Bermudez is a 25 y.o. female , id x 2(name and ). Reviewed record, history, and  medications. Chief Complaint   Patient presents with    Follow-up    Labs       Vitals:    23 1429   BP: 126/80   Pulse: 81   Resp: 18   Temp: 98.3 °F (36.8 °C)   TempSrc: Oral   SpO2: 98%   Weight: 225 lb 12.8 oz (102.4 kg)   Height: 5' 9\" (1.753 m)       Coordination of Care Questionnaire:   1. Have you been to the ER, urgent care clinic since your last visit? Hospitalized since your last visit? Yes [facility]    2. Have you seen or consulted any other health care providers outside of the [address] [place] [address] since your last visit? Include any pap smears or colon screening.  No

## 2023-03-23 NOTE — PROGRESS NOTES
Subjective:     Roberto Garcia is a 25 y.o. female who presents for follow up of diabetes, hypertension, and MS. Diet and Lifestyle: generally follows a low fat low cholesterol diet, generally follows a low sodium diet, does not rigorously follow a diabetic diet, exercises sporadically, nonsmoker  Home BP Monitoring: is not measured at home    Cardiovascular ROS: taking medications as instructed, no medication side effects noted, no TIA's, no chest pain on exertion, no dyspnea on exertion, no swelling of ankles, no orthostatic dizziness or lightheadedness, no orthopnea or paroxysmal nocturnal dyspnea, no palpitations, no intermittent claudication symptoms. Reports MS symptoms under good control on Aubagio, tolerating well, good compliance with therapy. Seeing neurologist regularly for ongoing care. New concerns: none. Patient Active Problem List   Diagnosis Code    Prediabetes R73.03    Severe obesity (HCC) E66.01    Vision loss H54.7    Demyelinating disease ([facility] G37.9    HTN (hypertension) I10     No Known Allergies  Past Medical History:   Diagnosis Date    [facility]10/2021     History reviewed. No pertinent surgical history.   Family History   Problem Relation Age of Onset    Hypertension Mother     Hypertension Father     Elevated Lipids Father     Cancer Paternal Grandmother     Diabetes Maternal Grandmother      Social History     Tobacco Use    Smoking status: Never     Passive exposure: Yes    Smokeless tobacco: Never   Substance Use Topics    Alcohol use: No        Lab Results   Component Value Date/Time    WBC 6.9 12/27/2021 08:59 PM    HGB 13.3 12/27/2021 08:59 PM    HCT 38.8 12/27/2021 08:59 PM    Hematocrit (POC) 40 02/10/2021 04:40 PM    PLATELET 322 96/95/6803 08:59 PM    MCV 81.5 12/27/2021 08:59 PM     Lab Results   Component Value Date/Time    Hemoglobin A1c 5.3 02/12/2021 01:30 AM    Hemoglobin A1c 5.6 02/07/2020 11:03 AM    Hemoglobin A1c 5.9 (H) 06/06/2017 10:04 AM    Glucose 89 12/27/2021 08:59 PM    Glucose (POC) 86 02/10/2021 04:40 PM    LDL, calculated 69.2 02/07/2020 11:03 AM    Creatinine (POC) 0.7 02/10/2021 04:40 PM    Creatinine 0.84 12/27/2021 08:59 PM      Lab Results   Component Value Date/Time    Cholesterol, total 137 02/07/2020 11:03 AM    HDL Cholesterol 52 02/07/2020 11:03 AM    LDL, calculated 69.2 02/07/2020 11:03 AM    Triglyceride 79 02/07/2020 11:03 AM    CHOL/HDL Ratio 2.6 02/07/2020 11:03 AM     Lab Results   Component Value Date/Time    ALT (SGPT) 13 08/08/2022 04:08 PM    Alk. phosphatase 46 08/08/2022 04:08 PM    Bilirubin, direct 0.14 08/08/2022 04:08 PM    Bilirubin, total 0.6 08/08/2022 04:08 PM    Albumin 4.3 08/08/2022 04:08 PM    Protein, total 7.3 08/08/2022 04:08 PM    PLATELET 884 02/32/2901 08:59 PM       Lab Results   Component Value Date/Time    GFR est non-AA >60 12/27/2021 08:59 PM    GFRNA, POC >60 02/10/2021 04:40 PM    GFR est AA >60 12/27/2021 08:59 PM    GFRAA, POC >60 02/10/2021 04:40 PM    Creatinine 0.84 12/27/2021 08:59 PM    Creatinine (POC) 0.7 02/10/2021 04:40 PM    BUN 13 12/27/2021 08:59 PM    BUN (POC) 8 (L) 02/10/2021 04:40 PM    Sodium 137 12/27/2021 08:59 PM    Sodium (POC) 139 02/10/2021 04:40 PM    Potassium 3.5 12/27/2021 08:59 PM    Potassium (POC) 3.7 02/10/2021 04:40 PM    Chloride 104 12/27/2021 08:59 PM    Chloride (POC) 104 02/10/2021 04:40 PM    CO2 26 12/27/2021 08:59 PM     Lab Results   Component Value Date/Time    TSH 1.01 02/07/2020 11:03 AM         Review of Systems, additional:  A comprehensive review of systems was negative except for that written in the HPI.     Objective:     Visit Vitals  /80 (BP 1 Location: Left upper arm, BP Patient Position: Sitting)   Pulse 81   Temp 98.3 °F (36.8 °C) (Oral)   Resp 18   Ht 5' 9\" (1.753 m)   Wt 225 lb 12.8 oz (102.4 kg)   SpO2 98%   BMI 33.34 kg/m²     Physical Examination: General appearance - alert, well appearing, and in no distress and oriented to person, place, and time  Mental status - normal mood, behavior, speech, dress, motor activity, and thought processes  Eyes - sclera anicteric  Neck - supple, no significant adenopathy, thyroid exam: thyroid is normal in size without nodules or tenderness  Chest - clear to auscultation, no wheezes, rales or rhonchi, symmetric air entry  Heart - normal rate, regular rhythm, normal S1, S2, no murmurs, rubs, clicks or gallops, normal bilateral carotid upstroke without bruits, no JVD  Abdomen - soft, nontender, nondistended, no masses or organomegaly  bowel sounds normal  Neurological - alert, oriented, normal speech, no focal findings or movement disorder noted  Extremities - no pedal edema noted, intact peripheral pulses, no edema, redness or tenderness in the calves or thighs  Skin - normal coloration and turgor, no rashes      Assessment/Plan:       reviewed diet, exercise and weight control  copy of written low fat low cholesterol diet provided and reviewed  cardiovascular risk and specific lipid/LDL goals reviewed  reviewed medications and side effects in detail  reviewed potential future medication changes and side effects. Diagnoses and all orders for this visit:    1. Hypertension, unspecified type  At goal  Continue amlodipine 10 mg daily, hydrochlorothiazide 12.5 mg daily  Low-sodium diet recommended  Weight loss recommended  Continued abstinence from tobacco recommended  150 minutes of exercise weekly recommended  Labs today  -     LIPID PANEL; Future  -     CBC W/O DIFF; Future  -     METABOLIC PANEL, COMPREHENSIVE; Future  -     TSH 3RD GENERATION; Future    2. Prediabetes  Counseled on therapeutic lifestyle changes  Repeat A1c today  -     HEMOGLOBIN A1C WITH EAG; Future    3.  MS (multiple sclerosis) ([facility]  Symptoms currently controlled on Aubagio  No acute symptoms warranting change in treatment plan today  Follow-up with neurologist as scheduled    Follow-up and Dispositions    Return in about 6 months (around 9/22/2023), or if symptoms worsen or fail to improve, for blood pressure. I have discussed the diagnosis with the patient and the intended plan as seen in the above orders. The patient has received an after-visit summary and questions were answered concerning future plans. Patient conveyed understanding of the plan at the time of the visit.     Dana Magdaleno NP  3/22/23

## 2023-03-29 LAB
ALBUMIN SERPL-MCNC: 4.1 G/DL (ref 3.9–5)
ALBUMIN/GLOB SERPL: 1.5 {RATIO} (ref 1.2–2.2)
ALP SERPL-CCNC: 46 IU/L (ref 44–121)
ALT SERPL-CCNC: 19 IU/L (ref 0–32)
AST SERPL-CCNC: 18 IU/L (ref 0–40)
BILIRUB SERPL-MCNC: 0.7 MG/DL (ref 0–1.2)
BUN SERPL-MCNC: 14 MG/DL (ref 6–20)
BUN/CREAT SERPL: 18 (ref 9–23)
CALCIUM SERPL-MCNC: 9.2 MG/DL (ref 8.7–10.2)
CHLORIDE SERPL-SCNC: 104 MMOL/L (ref 96–106)
CHOLEST SERPL-MCNC: 173 MG/DL (ref 100–199)
CO2 SERPL-SCNC: 21 MMOL/L (ref 20–29)
CREAT SERPL-MCNC: 0.77 MG/DL (ref 0.57–1)
EGFRCR SERPLBLD CKD-EPI 2021: 112 ML/MIN/1.73
ERYTHROCYTE [DISTWIDTH] IN BLOOD BY AUTOMATED COUNT: 12.5 % (ref 11.7–15.4)
EST. AVERAGE GLUCOSE BLD GHB EST-MCNC: 97 MG/DL
GLOBULIN SER CALC-MCNC: 2.8 G/DL (ref 1.5–4.5)
GLUCOSE SERPL-MCNC: 86 MG/DL (ref 70–99)
HBA1C MFR BLD: 5 % (ref 4.8–5.6)
HCT VFR BLD AUTO: 39 % (ref 34–46.6)
HDLC SERPL-MCNC: 72 MG/DL
HGB BLD-MCNC: 12.8 G/DL (ref 11.1–15.9)
IMP & REVIEW OF LAB RESULTS: NORMAL
LDLC SERPL CALC-MCNC: 91 MG/DL (ref 0–99)
MCH RBC QN AUTO: 27.8 PG (ref 26.6–33)
MCHC RBC AUTO-ENTMCNC: 32.8 G/DL (ref 31.5–35.7)
MCV RBC AUTO: 85 FL (ref 79–97)
PLATELET # BLD AUTO: 284 X10E3/UL (ref 150–450)
POTASSIUM SERPL-SCNC: 4.6 MMOL/L (ref 3.5–5.2)
PROT SERPL-MCNC: 6.9 G/DL (ref 6–8.5)
RBC # BLD AUTO: 4.6 X10E6/UL (ref 3.77–5.28)
SODIUM SERPL-SCNC: 140 MMOL/L (ref 134–144)
TRIGL SERPL-MCNC: 49 MG/DL (ref 0–149)
TSH SERPL DL<=0.005 MIU/L-ACNC: 1.47 UIU/ML (ref 0.45–4.5)
VLDLC SERPL CALC-MCNC: 10 MG/DL (ref 5–40)
WBC # BLD AUTO: 3.5 X10E3/UL (ref 3.4–10.8)

## 2023-05-04 ENCOUNTER — TELEPHONE (OUTPATIENT)
Dept: NEUROLOGY | Age: 23
End: 2023-05-04

## 2023-05-09 ENCOUNTER — TELEPHONE (OUTPATIENT)
Age: 23
End: 2023-05-09

## 2023-05-09 DIAGNOSIS — G35 MULTIPLE SCLEROSIS (HCC): Primary | ICD-10-CM

## 2023-05-09 RX ORDER — RENAGEL 800 MG/1
TABLET ORAL
Qty: 90 TABLET | Refills: 1 | Status: SHIPPED | OUTPATIENT
Start: 2023-05-09

## 2023-05-09 NOTE — TELEPHONE ENCOUNTER
Dr. Elana Espinal ordered MRI of brain, cervical, thoracic last year but was never completed. Reordered under Dr. Isabela Flood as pt will be following with him upon Dr. Bret Bartholomew [facility]. NOV scheduled for sept, generic Aubagio sent to [facility] to prevent disruption of DMT tx until pt is seen.

## 2023-07-07 ENCOUNTER — TELEPHONE (OUTPATIENT)
Age: 23
End: 2023-07-07

## 2023-07-07 DIAGNOSIS — F40.240 CLAUSTROPHOBIA: Primary | ICD-10-CM

## 2023-07-07 RX ORDER — DIAZEPAM 10 MG/1
TABLET ORAL
Qty: 1 TABLET | Refills: 0 | Status: SHIPPED | OUTPATIENT
Start: 2023-07-07 | End: 2023-08-04

## 2023-07-07 NOTE — TELEPHONE ENCOUNTER
Patient requesting a medication to help her proceed with the MRI. Unable to complete without medication.     She's rescheduled for 7/14/23

## 2023-07-14 ENCOUNTER — HOSPITAL ENCOUNTER (OUTPATIENT)
Facility: HOSPITAL | Age: 23
Discharge: HOME | End: 2023-07-14
Attending: PSYCHIATRY & NEUROLOGY
Payer: COMMERCIAL

## 2023-07-14 ENCOUNTER — HOSPITAL ENCOUNTER (OUTPATIENT)
Facility: HOSPITAL | Age: 23
End: 2023-07-14
Attending: PSYCHIATRY & NEUROLOGY
Payer: COMMERCIAL

## 2023-07-14 DIAGNOSIS — G35 MULTIPLE SCLEROSIS (HCC): ICD-10-CM

## 2023-07-14 PROCEDURE — A9579 GAD-BASE MR CONTRAST NOS,1ML: HCPCS | Performed by: PSYCHIATRY & NEUROLOGY

## 2023-07-14 PROCEDURE — 72156 MRI NECK SPINE W/O & W/DYE: CPT

## 2023-07-14 PROCEDURE — 70553 MRI BRAIN STEM W/O & W/DYE: CPT

## 2023-07-14 PROCEDURE — 72157 MRI CHEST SPINE W/O & W/DYE: CPT

## 2023-07-14 PROCEDURE — 6360000004 HC RX CONTRAST MEDICATION: Performed by: PSYCHIATRY & NEUROLOGY

## 2023-07-14 RX ADMIN — GADOTERIDOL 20 ML: 279.3 INJECTION, SOLUTION INTRAVENOUS at 16:12

## 2023-07-21 ENCOUNTER — TELEPHONE (OUTPATIENT)
Age: 23
End: 2023-07-21

## 2023-07-21 NOTE — TELEPHONE ENCOUNTER
----- Message from Keila Cook MD sent at 7/21/2023  3:07 PM EDT -----  Patient's brain MRI demonstrates new MS lesions  Her cervical and thoracic cord are fine  She will need an appointment to discuss alternative therapy  Send her information on Zeposia and [facility] and schedule sooner follow-up appointment  ----- Message -----  From: Melonie Dennis Incoming Orders Results To [facility]  Sent: 7/19/2023   5:22 PM EDT  To: Keila Cook MD

## 2023-07-25 ENCOUNTER — OFFICE VISIT (OUTPATIENT)
Age: 23
End: 2023-07-25
Payer: COMMERCIAL

## 2023-07-25 VITALS — DIASTOLIC BLOOD PRESSURE: 82 MMHG | OXYGEN SATURATION: 98 % | SYSTOLIC BLOOD PRESSURE: 122 MMHG | HEART RATE: 72 BPM

## 2023-07-25 DIAGNOSIS — G35 MULTIPLE SCLEROSIS (HCC): Primary | ICD-10-CM

## 2023-07-25 PROCEDURE — 3074F SYST BP LT 130 MM HG: CPT | Performed by: PSYCHIATRY & NEUROLOGY

## 2023-07-25 PROCEDURE — 99214 OFFICE O/P EST MOD 30 MIN: CPT | Performed by: PSYCHIATRY & NEUROLOGY

## 2023-07-25 PROCEDURE — 3079F DIAST BP 80-89 MM HG: CPT | Performed by: PSYCHIATRY & NEUROLOGY

## 2023-07-25 NOTE — PROGRESS NOTES
MS has been doing OK  Discuss alt med and discuss the results
progression  We discussed options including staying on Aubagio and repeating imaging in 6 months versus changing disease modifying therapy  Today we discussed Garrett Brilliant and ocrevus  Literature given regarding these 3 meds  She and her family will review and alert us to preference  Once decision has been made, we will proceed with flushing Aubagio and getting the prerequisite laboratory analysis performed  Leave follow-up open until the above    Britt Mackenzie MD        This note was created using voice recognition software. Despite editing, there may be syntax errors.

## 2023-08-30 RX ORDER — AMLODIPINE BESYLATE 10 MG/1
TABLET ORAL
Qty: 30 TABLET | Refills: 0 | Status: SHIPPED | OUTPATIENT
Start: 2023-08-30

## 2023-08-30 RX ORDER — HYDROCHLOROTHIAZIDE 12.5 MG/1
TABLET ORAL
Qty: 30 TABLET | Refills: 0 | Status: SHIPPED | OUTPATIENT
Start: 2023-08-30

## 2023-10-02 RX ORDER — HYDROCHLOROTHIAZIDE 12.5 MG/1
TABLET ORAL
Qty: 30 TABLET | Refills: 0 | Status: SHIPPED | OUTPATIENT
Start: 2023-10-02

## 2023-10-02 RX ORDER — AMLODIPINE BESYLATE 10 MG/1
TABLET ORAL
Qty: 30 TABLET | Refills: 0 | Status: SHIPPED | OUTPATIENT
Start: 2023-10-02

## 2023-10-12 ENCOUNTER — APPOINTMENT (OUTPATIENT)
Facility: HOSPITAL | Age: 23
End: 2023-10-12
Payer: COMMERCIAL

## 2023-10-12 ENCOUNTER — HOSPITAL ENCOUNTER (EMERGENCY)
Facility: HOSPITAL | Age: 23
Discharge: HOME | End: 2023-10-12
Attending: EMERGENCY MEDICINE
Payer: COMMERCIAL

## 2023-10-12 VITALS
DIASTOLIC BLOOD PRESSURE: 82 MMHG | TEMPERATURE: 98.2 F | RESPIRATION RATE: 20 BRPM | SYSTOLIC BLOOD PRESSURE: 137 MMHG | HEIGHT: 69 IN | BODY MASS INDEX: 34.07 KG/M2 | OXYGEN SATURATION: 96 % | HEART RATE: 88 BPM | WEIGHT: 230 LBS

## 2023-10-12 DIAGNOSIS — J06.9 ACUTE UPPER RESPIRATORY INFECTION: Primary | ICD-10-CM

## 2023-10-12 LAB
FLUAV AG NPH QL IA: NEGATIVE
FLUBV AG NOSE QL IA: NEGATIVE

## 2023-10-12 PROCEDURE — 87804 INFLUENZA ASSAY W/OPTIC: CPT

## 2023-10-12 PROCEDURE — 87635 SARS-COV-2 COVID-19 AMP PRB: CPT

## 2023-10-12 PROCEDURE — 99284 EMERGENCY DEPT VISIT MOD MDM: CPT

## 2023-10-12 PROCEDURE — 71046 X-RAY EXAM CHEST 2 VIEWS: CPT

## 2023-10-12 RX ORDER — FLUTICASONE PROPIONATE 50 MCG
2 SPRAY, SUSPENSION (ML) NASAL DAILY
Qty: 16 G | Refills: 0 | Status: SHIPPED | OUTPATIENT
Start: 2023-10-12

## 2023-10-12 RX ORDER — NAPROXEN 500 MG/1
500 TABLET ORAL 2 TIMES DAILY PRN
Qty: 30 TABLET | Refills: 0 | Status: SHIPPED | OUTPATIENT
Start: 2023-10-12

## 2023-10-12 RX ORDER — BENZONATATE 200 MG/1
200 CAPSULE ORAL 3 TIMES DAILY PRN
Qty: 30 CAPSULE | Refills: 0 | Status: SHIPPED | OUTPATIENT
Start: 2023-10-12

## 2023-10-12 ASSESSMENT — PAIN - FUNCTIONAL ASSESSMENT: PAIN_FUNCTIONAL_ASSESSMENT: NONE - DENIES PAIN

## 2023-10-12 ASSESSMENT — LIFESTYLE VARIABLES
HOW MANY STANDARD DRINKS CONTAINING ALCOHOL DO YOU HAVE ON A TYPICAL DAY: 1 OR 2
HOW OFTEN DO YOU HAVE A DRINK CONTAINING ALCOHOL: MONTHLY OR LESS

## 2023-10-12 ASSESSMENT — ENCOUNTER SYMPTOMS: COUGH: 1

## 2023-10-12 NOTE — ED TRIAGE NOTES
PT reports a sore throat with fever of 101. PT has been taking tylenol last dose was 6am today. Pt also reports body aches and sweats.

## 2023-10-12 NOTE — DISCHARGE INSTRUCTIONS
Thank you for allowing us to provide you with medical care today. We realize that you have many choices for your emergency care needs. We thank you for choosing [facility]. Please choose us in the future for any continued health care needs. We hope we addressed all of your medical concerns. We strive to provide excellent quality care in the Emergency Department. Anything less than excellent does not meet our expectations. The exam and treatment you received in the Emergency Department were for an emergent problem and are not intended as complete care. It is important that you follow up with a doctor, nurse practitioner, or physician's assistant for ongoing care. If your symptoms worsen or you do not improve as expected and you are unable to reach your usual health care provider, you should return to the Emergency Department. We are available 24 hours a day. Take this sheet with you when you go to your follow-up visit. If you have any problem arranging the follow-up visit, contact the Emergency Department immediately. Make an appointment your family doctor for follow up of this visit. Return to the ER if you are unable to be seen in a timely manner.

## 2023-10-12 NOTE — ED PROVIDER NOTES
[address] [place] [address]   [phone]    As needed, If symptoms worsen    DISCHARGE MEDICATIONS:  Discharge Medication List as of 10/12/2023 12:40 PM        START taking these medications    Details   benzonatate (TESSALON) 200 MG capsule Take 1 capsule by mouth 3 times daily as needed for Cough, Disp-30 capsule, R-0Normal      naproxen (NAPROSYN) 500 MG tablet Take 1 tablet by mouth 2 times daily as needed for Pain, Disp-30 tablet, R-0Normal      fluticasone (FLONASE) 50 MCG/ACT nasal spray 2 sprays by Each Nostril route daily, Disp-16 g, R-0Normal           Return to the ED if worse      Please note that this dictation was completed with [employer], the [employer] voice recognition software. Quite often unanticipated grammatical, syntax, homophones, and other interpretive errors are inadvertently transcribed by the computer software. Please disregard these errors. Please excuse any errors that have escaped final proofreading.     DEAN Loja NP (electronically signed)        DEAN Loja NP  10/12/23 [phone]

## 2023-10-12 NOTE — ED NOTES
Patient does not appear to be in any acute distress/shows no evidence of clinical instability at this time. Provider has reviewed discharge instructions with the patient/family. The patient/family verbalized understanding instructions as well as need for follow up for any further symptoms. Discharge papers given, education provided, and any questions answered.         Shiela Dougherty RN  10/12/23 [phone]

## 2023-10-13 LAB
SARS-COV-2 RNA RESP QL NAA+PROBE: NOT DETECTED
SOURCE: NORMAL

## 2023-11-13 RX ORDER — HYDROCHLOROTHIAZIDE 12.5 MG/1
TABLET ORAL
Qty: 30 TABLET | Refills: 0 | Status: SHIPPED | OUTPATIENT
Start: 2023-11-13

## 2023-11-13 RX ORDER — AMLODIPINE BESYLATE 10 MG/1
TABLET ORAL
Qty: 30 TABLET | Refills: 0 | Status: SHIPPED | OUTPATIENT
Start: 2023-11-13

## 2023-12-14 RX ORDER — AMLODIPINE BESYLATE 10 MG/1
TABLET ORAL
Qty: 30 TABLET | Refills: 0 | Status: SHIPPED | OUTPATIENT
Start: 2023-12-14

## 2023-12-14 RX ORDER — HYDROCHLOROTHIAZIDE 12.5 MG/1
TABLET ORAL
Qty: 30 TABLET | Refills: 0 | Status: SHIPPED | OUTPATIENT
Start: 2023-12-14

## 2023-12-30 ENCOUNTER — HOSPITAL ENCOUNTER (EMERGENCY)
Facility: HOSPITAL | Age: 23
Discharge: HOME | End: 2023-12-30
Attending: STUDENT IN AN ORGANIZED HEALTH CARE EDUCATION/TRAINING PROGRAM
Payer: COMMERCIAL

## 2023-12-30 ENCOUNTER — APPOINTMENT (OUTPATIENT)
Facility: HOSPITAL | Age: 23
End: 2023-12-30
Payer: COMMERCIAL

## 2023-12-30 VITALS
HEART RATE: 135 BPM | HEIGHT: 69 IN | SYSTOLIC BLOOD PRESSURE: 128 MMHG | OXYGEN SATURATION: 97 % | RESPIRATION RATE: 18 BRPM | BODY MASS INDEX: 35.55 KG/M2 | TEMPERATURE: 102.9 F | WEIGHT: 240 LBS | DIASTOLIC BLOOD PRESSURE: 79 MMHG

## 2023-12-30 DIAGNOSIS — J10.1 INFLUENZA A: Primary | ICD-10-CM

## 2023-12-30 LAB
FLUAV AG NPH QL IA: POSITIVE
FLUBV AG NOSE QL IA: NEGATIVE
SARS-COV-2 RDRP RESP QL NAA+PROBE: NOT DETECTED
SOURCE: NORMAL

## 2023-12-30 PROCEDURE — 99284 EMERGENCY DEPT VISIT MOD MDM: CPT

## 2023-12-30 PROCEDURE — 87635 SARS-COV-2 COVID-19 AMP PRB: CPT

## 2023-12-30 PROCEDURE — 6370000000 HC RX 637 (ALT 250 FOR IP): Performed by: NURSE PRACTITIONER

## 2023-12-30 PROCEDURE — 87804 INFLUENZA ASSAY W/OPTIC: CPT

## 2023-12-30 PROCEDURE — 71046 X-RAY EXAM CHEST 2 VIEWS: CPT

## 2023-12-30 PROCEDURE — 6370000000 HC RX 637 (ALT 250 FOR IP): Performed by: STUDENT IN AN ORGANIZED HEALTH CARE EDUCATION/TRAINING PROGRAM

## 2023-12-30 RX ORDER — PREDNISONE 50 MG/1
50 TABLET ORAL DAILY
Qty: 5 TABLET | Refills: 0 | Status: SHIPPED | OUTPATIENT
Start: 2023-12-30 | End: 2024-01-04

## 2023-12-30 RX ORDER — ACETAMINOPHEN 500 MG
1000 TABLET ORAL
Status: COMPLETED | OUTPATIENT
Start: 2023-12-30 | End: 2023-12-30

## 2023-12-30 RX ORDER — ACETAMINOPHEN 500 MG
500 TABLET ORAL 4 TIMES DAILY PRN
Qty: 30 TABLET | Refills: 0 | Status: SHIPPED | OUTPATIENT
Start: 2023-12-30

## 2023-12-30 RX ORDER — IBUPROFEN 600 MG/1
600 TABLET ORAL EVERY 6 HOURS PRN
Qty: 30 TABLET | Refills: 0 | Status: SHIPPED | OUTPATIENT
Start: 2023-12-30

## 2023-12-30 RX ORDER — IBUPROFEN 800 MG/1
800 TABLET ORAL
Status: COMPLETED | OUTPATIENT
Start: 2023-12-30 | End: 2023-12-30

## 2023-12-30 RX ORDER — GUAIFENESIN 600 MG/1
600 TABLET, EXTENDED RELEASE ORAL 2 TIMES DAILY
Qty: 30 TABLET | Refills: 0 | Status: SHIPPED | OUTPATIENT
Start: 2023-12-30 | End: 2024-01-14

## 2023-12-30 RX ADMIN — IBUPROFEN 800 MG: 800 TABLET, FILM COATED ORAL at 16:08

## 2023-12-30 RX ADMIN — ACETAMINOPHEN 1000 MG: 500 TABLET ORAL at 16:08

## 2023-12-30 RX ADMIN — PREDNISONE 50 MG: 20 TABLET ORAL at 16:08

## 2023-12-30 ASSESSMENT — PAIN SCALES - GENERAL: PAINLEVEL_OUTOF10: 8

## 2023-12-30 ASSESSMENT — PAIN DESCRIPTION - PAIN TYPE: TYPE: ACUTE PAIN

## 2023-12-30 ASSESSMENT — PAIN - FUNCTIONAL ASSESSMENT: PAIN_FUNCTIONAL_ASSESSMENT: 0-10

## 2023-12-30 ASSESSMENT — PAIN DESCRIPTION - LOCATION: LOCATION: GENERALIZED

## 2023-12-30 ASSESSMENT — PAIN DESCRIPTION - DESCRIPTORS: DESCRIPTORS: ACHING

## 2023-12-30 NOTE — ED PROVIDER NOTES
[facility] EMERGENCY DEPT  EMERGENCY DEPARTMENT ENCOUNTER      Pt Name: Jeanette Fry  MRN: [id number]  [address] [facility] 2000  Date of evaluation: 12/30/2023  Provider: DEAN Roca NP    [address]       Chief Complaint   Patient presents with    Flu Like Symptoms         HISTORY OF PRESENT ILLNESS   (Location/Symptom, Timing/Onset, Context/Setting, Quality, Duration, Modifying Factors, Severity)  Note limiting factors. The history is provided by the patient. No  was used. Jeanette Fry is a 26 y.o. female with Hx of MS, HTN, prediabetes who presents ambulatory by herself to [facility] ED with cc of flu like symptoms. Pt reports known exposure to Flu A at work. States 4d history of cough, congestion, fevers, body aches. Has taken Tylenol and ibuprofen which have relieved to her fevers intermittently, but she states the fevers continue to come back. Last dose of Tylenol or ibuprofen was last night. Denies chest pain, shortness of breath, difficulty breathing, nausea, vomiting, urinary concerns, rashes. Denies chance of pregnancy. Denies tobacco, alcohol, substance abuse    Incidentally, patient has a history of multiple sclerosis, states that she has not been able to take her medications as prescribed since her last neurology visit because they are too expensive. PCP: DEAN Llamas NP    There are no other complaints, changes or physical findings at this time. Review of External Medical Records:     Nursing Notes were reviewed. REVIEW OF SYSTEMS    (2-9 systems for level 4, 10 or more for level 5)     Review of Systems   Constitutional:  Positive for activity change, appetite change and fever. HENT:  Positive for congestion and rhinorrhea. Respiratory:  Positive for cough. Negative for shortness of breath. Cardiovascular:  Negative for chest pain. Gastrointestinal:  Negative for abdominal pain, diarrhea, nausea and vomiting.

## 2023-12-30 NOTE — DISCHARGE INSTRUCTIONS
Your chest x-ray is NORMAL. Flu A POSITIVE. Your COVID testing is negative. We should hope your symptoms will improve within the next 3 days. Please follow-up with your primary care provider. Continue to isolate until you are fever free for 24 hours. Alternating Tylenol with ibuprofen will likely make you feel more functional, but as pointed out, but only temporarily relieve your fever until the virus starts to go away. You can always supplement with Airborne and EmergenC vitamins. We are also prescribing other medications that should help manage her symptoms in the interim. Please make sure that you are trying to stay well-hydrated and drink at least 8-10 large glasses of water a day. Try to maintain activity as well as tolerated.

## 2023-12-30 NOTE — ED TRIAGE NOTES
Patient arrives in the ED ambulatory, alert and oriented x 4, breaths even and unlabored and in no acute distress with complaints of fever, generalized body aches, nasal congestion, and cough x 5 days.

## 2023-12-30 NOTE — ED NOTES
Pt was discharged and given instructions by MD. Pt verbalized good understanding of all discharge instructions, 4 prescriptions and F/U care. All questions answered. Pt in stable condition on discharge.

## 2024-01-17 ENCOUNTER — TELEPHONE (OUTPATIENT)
Age: 24
End: 2024-01-17

## 2024-01-17 NOTE — TELEPHONE ENCOUNTER
Marybeth RODRÍGUEZ Marlon  TOÑO [facility]  Staff13 hours ago (12:08 AM)     KR  Okay cool. That date is fine. Also I haven’t been on aubagio for about 2-3 months now because I couldn’t afford it so it might be out of my system now. No need for the flush out? … yes I will be picking a new medicine I will inform y’all when I choose        RILANDA Burton ReidYesterday (7:10 AM)     NL  This is Dr. Goodson next available at this time.        Marybeth Mason MRN: [id number]     Date: 5/23/2024 Status: [facility]   Time: 9:30 AM Length: 15   Visit Type: FOLLOW UP [1002] Copay: $40.00   Provider: Dottie Goodson MD        Look into the medications that was suggested at your last visit and let us know so I can let Dr. Goodson know. It might be something that can be started before your follow up. IRLANDA Vyasmargarita RODRÍGUEZ ReidYesterday (7:06 AM)     NL  Good morning. Per your last visit with Dr. Goodson   Impression/Plan  Multiple sclerosis clinically stable but with radiographic progression  We discussed options including staying on Aubagio and repeating imaging in 6 months versus changing disease modifying therapy  Today we discussed Mavenclad, Zeposia and ocrevus  Literature given regarding these 3 meds  She and her family will review and alert us to preference  Once decision has been made, we will proceed with flushing Aubagio and getting the prerequisite laboratory analysis performed  Leave follow-up open until the above.      Did you pick which medicine that you wanted to go with ?   I can schedule you for Dr. Goodson next available appointment. As he does not have anything in your requested time frame.         Marybeth S Marlon Rivas [facility]  Staff6 days ago     KR  Appointment Request From: Marybeth Mason     With Provider: Dottie Goodson MD [[facility]]     Preferred Date Range: 1/25/2024 - 2/1/2024     Preferred Times: Any Time    Area M Indication Text: Tumors in this location are included in Area M (cheek, forehead, scalp, neck, jawline and pretibial skin).  Mohs surgery is indicated for tumors in these anatomic locations.

## 2024-01-19 NOTE — TELEPHONE ENCOUNTER
The patient is no longer taking the aubagio . The patient stopped it 2-3 months  ago. The patient was asking if the medication was out of her system. Is there something that she needs to take to flush out the medication  or is it already out ?

## 2024-02-02 RX ORDER — HYDROCHLOROTHIAZIDE 12.5 MG/1
TABLET ORAL
Qty: 30 TABLET | Refills: 0 | Status: SHIPPED | OUTPATIENT
Start: 2024-02-02

## 2024-02-02 RX ORDER — AMLODIPINE BESYLATE 10 MG/1
TABLET ORAL
Qty: 30 TABLET | Refills: 0 | Status: SHIPPED | OUTPATIENT
Start: 2024-02-02

## 2024-02-28 ENCOUNTER — CLINICAL DOCUMENTATION (OUTPATIENT)
Age: 24
End: 2024-02-28

## 2024-02-28 NOTE — PROGRESS NOTES
LVM for pt to [facility] to r/s today's appt d/t provider illness. Call went straight to .  ~ RCF 2/28/24

## 2024-03-04 ENCOUNTER — OFFICE VISIT (OUTPATIENT)
Age: 24
End: 2024-03-04
Payer: COMMERCIAL

## 2024-03-04 VITALS
SYSTOLIC BLOOD PRESSURE: 126 MMHG | DIASTOLIC BLOOD PRESSURE: 81 MMHG | BODY MASS INDEX: 43.4 KG/M2 | HEART RATE: 87 BPM | OXYGEN SATURATION: 99 % | WEIGHT: 293 LBS | HEIGHT: 69 IN | TEMPERATURE: 98.9 F

## 2024-03-04 DIAGNOSIS — R73.03 PREDIABETES: ICD-10-CM

## 2024-03-04 DIAGNOSIS — R63.5 ABNORMAL WEIGHT GAIN: ICD-10-CM

## 2024-03-04 DIAGNOSIS — I10 ESSENTIAL (PRIMARY) HYPERTENSION: ICD-10-CM

## 2024-03-04 DIAGNOSIS — L73.2 HIDRADENITIS AXILLARIS: ICD-10-CM

## 2024-03-04 DIAGNOSIS — E66.01 OBESITY, CLASS III, BMI 40-49.9 (MORBID OBESITY) (HCC): ICD-10-CM

## 2024-03-04 DIAGNOSIS — I10 ESSENTIAL (PRIMARY) HYPERTENSION: Primary | ICD-10-CM

## 2024-03-04 DIAGNOSIS — G35 MULTIPLE SCLEROSIS (HCC): ICD-10-CM

## 2024-03-04 PROCEDURE — 99214 OFFICE O/P EST MOD 30 MIN: CPT | Performed by: NURSE PRACTITIONER

## 2024-03-04 PROCEDURE — 3074F SYST BP LT 130 MM HG: CPT | Performed by: NURSE PRACTITIONER

## 2024-03-04 PROCEDURE — 3079F DIAST BP 80-89 MM HG: CPT | Performed by: NURSE PRACTITIONER

## 2024-03-04 SDOH — ECONOMIC STABILITY: HOUSING INSECURITY
IN THE LAST 12 MONTHS, WAS THERE A TIME WHEN YOU DID NOT HAVE A STEADY PLACE TO SLEEP OR SLEPT IN A SHELTER (INCLUDING NOW)?: NO

## 2024-03-04 SDOH — ECONOMIC STABILITY: INCOME INSECURITY: HOW HARD IS IT FOR YOU TO PAY FOR THE VERY BASICS LIKE FOOD, HOUSING, MEDICAL CARE, AND HEATING?: NOT HARD AT ALL

## 2024-03-04 SDOH — ECONOMIC STABILITY: FOOD INSECURITY: WITHIN THE PAST 12 MONTHS, YOU WORRIED THAT YOUR FOOD WOULD RUN OUT BEFORE YOU GOT MONEY TO BUY MORE.: NEVER TRUE

## 2024-03-04 SDOH — ECONOMIC STABILITY: TRANSPORTATION INSECURITY
IN THE PAST 12 MONTHS, HAS LACK OF TRANSPORTATION KEPT YOU FROM MEETINGS, WORK, OR FROM GETTING THINGS NEEDED FOR DAILY LIVING?: NO

## 2024-03-04 SDOH — ECONOMIC STABILITY: FOOD INSECURITY: WITHIN THE PAST 12 MONTHS, THE FOOD YOU BOUGHT JUST DIDN'T LAST AND YOU DIDN'T HAVE MONEY TO GET MORE.: NEVER TRUE

## 2024-03-04 NOTE — PROGRESS NOTES
Marybeth Mason is a 23 y.o. female , id x 2(name and ). Reviewed record, history, and  medications.      Chief Complaint   Patient presents with    Follow-up Chronic Condition    Medication Refill    Cyst     (L) arm pit         Vitals:    24 1045   BP: 126/81   Pulse: 87   Temp: 98.9 °F (37.2 °C)   SpO2: 99%   Weight: 135.4 kg (298 lb 9.6 oz)   Height: 1.753 m (5' 9\")       Coordination of Care Questionnaire:   1. Have you been to the ER, urgent care clinic since your last visit?  Hospitalized since your last visit?No    2. Have you seen or consulted any other health care providers outside of the [facility] since your last visit?  Include any pap smears or colon screening. No          3/22/2023     2:32 PM   PHQ-9    Little interest or pleasure in doing things 0   Feeling down, depressed, or hopeless 0   PHQ-2 Score 0   PHQ-9 Total Score 0            No data to display                Social Determinants of Health     Tobacco Use: Low Risk  (3/4/2024)    Patient History     Smoking Tobacco Use: Never     Smokeless Tobacco Use: Never     Passive Exposure: Not on file   Alcohol Use: Not At Risk (10/12/2023)    AUDIT-C     Frequency of Alcohol Consumption: Monthly or less     Average Number of Drinks: 1 or 2     Frequency of Binge Drinking: Less than monthly   Financial Resource Strain: Not on file   Food Insecurity: Not on file (3/4/2024)   Transportation Needs: Not on file   Physical Activity: Not on file   Stress: Not on file   Social Connections: Not on file   Intimate Partner Violence: Not on file   Depression: Not at risk (3/22/2023)    PHQ-2     PHQ-2 Score: 0   Housing Stability: Not on file   Interpersonal Safety: Not on file   Utilities: Not on file

## 2024-03-04 NOTE — PROGRESS NOTES
Progress Note    she is a 23 y.o. year old female who presents for evaluation of Follow-up Chronic Condition, Medication Refill, and Cyst ((L) arm pit)        Assessment/ Plan:     1. Essential (primary) hypertension  at goal  Continue amlodipine 10 mg daily, hctz 12.5 mg daily  Recommended:  Low sodium diet  Weight loss  150 minutes of moderate intensity exercise weekly  -     Comprehensive Metabolic Panel; Future  -     CBC; Future  -     Lipid Panel; Future  -     TSH; Future    2. Prediabetes  Counseled on therapeutic lifestyle changes  Repeat A1c  Consider metformin if A1c worsening, this may also help with #3 below  -     Hemoglobin A1C; Future    3. Hidradenitis axillaris  Infrequent lesions  Recommend gentle skin care, warm compresses to affected area  Dwp nodule may drain, if this happens can apply guaze bandages to protect wound and clothing  Call if symptoms worsen      4. Obesity, Class III, BMI 40-49.9 (morbid obesity) (HCC  5. Abnormal weight gain  Discussed above normal BMI with patient, recommended the following interventions: Reduced caloric intake, reduced intake of sugar and simple carbohydrates, increased intake of protein and fiber, increased physical activity, and regular monitoring of weight at home  50 lb weight gain in interim since last visit  Encouraged patient to make changes to diet and exercise habits  Will check labs as well  -     Hemoglobin A1C; Future  -     TSH; Future    6. Multiple sclerosis (HCC)  Clinically doing well but progression of disease was noted on most recent imaging  No longer on aubagio  Encouraged to follow up with neurology promptly to resume disease-modifying therapy      Patient is  fasting for labs today.    Return in about 6 months (around 9/4/2024) for blood pressure, cholesterol and fasting labs, prediabetes.      I have discussed the diagnosis with the patient and the intended plan as seen in the above orders.    The patient has received an after-visit

## 2024-03-05 LAB
ALBUMIN SERPL-MCNC: 3.5 G/DL (ref 3.5–5)
ALBUMIN/GLOB SERPL: 0.9 (ref 1.1–2.2)
ALP SERPL-CCNC: 61 U/L (ref 45–117)
ALT SERPL-CCNC: 19 U/L (ref 12–78)
ANION GAP SERPL CALC-SCNC: 6 MMOL/L (ref 5–15)
AST SERPL-CCNC: 13 U/L (ref 15–37)
BILIRUB SERPL-MCNC: 0.8 MG/DL (ref 0.2–1)
BUN SERPL-MCNC: 9 MG/DL (ref 6–20)
BUN/CREAT SERPL: 11 (ref 12–20)
CALCIUM SERPL-MCNC: 9.1 MG/DL (ref 8.5–10.1)
CHLORIDE SERPL-SCNC: 107 MMOL/L (ref 97–108)
CHOLEST SERPL-MCNC: 158 MG/DL
CO2 SERPL-SCNC: 26 MMOL/L (ref 21–32)
CREAT SERPL-MCNC: 0.82 MG/DL (ref 0.55–1.02)
ERYTHROCYTE [DISTWIDTH] IN BLOOD BY AUTOMATED COUNT: 14.4 % (ref 11.5–14.5)
EST. AVERAGE GLUCOSE BLD GHB EST-MCNC: 105 MG/DL
GLOBULIN SER CALC-MCNC: 3.9 G/DL (ref 2–4)
GLUCOSE SERPL-MCNC: 99 MG/DL (ref 65–100)
HBA1C MFR BLD: 5.3 % (ref 4–5.6)
HCT VFR BLD AUTO: 36.3 % (ref 35–47)
HDLC SERPL-MCNC: 51 MG/DL
HDLC SERPL: 3.1 (ref 0–5)
HGB BLD-MCNC: 11.6 G/DL (ref 11.5–16)
LDLC SERPL CALC-MCNC: 81.4 MG/DL (ref 0–100)
MCH RBC QN AUTO: 25.3 PG (ref 26–34)
MCHC RBC AUTO-ENTMCNC: 32 G/DL (ref 30–36.5)
MCV RBC AUTO: 79.3 FL (ref 80–99)
NRBC # BLD: 0 K/UL (ref 0–0.01)
NRBC BLD-RTO: 0 PER 100 WBC
PLATELET # BLD AUTO: 357 K/UL (ref 150–400)
PMV BLD AUTO: 12.5 FL (ref 8.9–12.9)
POTASSIUM SERPL-SCNC: 3.8 MMOL/L (ref 3.5–5.1)
PROT SERPL-MCNC: 7.4 G/DL (ref 6.4–8.2)
RBC # BLD AUTO: 4.58 M/UL (ref 3.8–5.2)
SODIUM SERPL-SCNC: 139 MMOL/L (ref 136–145)
TRIGL SERPL-MCNC: 128 MG/DL
TSH SERPL DL<=0.05 MIU/L-ACNC: 1.76 UIU/ML (ref 0.36–3.74)
VLDLC SERPL CALC-MCNC: 25.6 MG/DL
WBC # BLD AUTO: 6.5 K/UL (ref 3.6–11)

## 2024-03-11 RX ORDER — HYDROCHLOROTHIAZIDE 12.5 MG/1
12.5 TABLET ORAL DAILY
Qty: 90 TABLET | Refills: 1 | Status: SHIPPED | OUTPATIENT
Start: 2024-03-11

## 2024-03-11 RX ORDER — AMLODIPINE BESYLATE 10 MG/1
10 TABLET ORAL DAILY
Qty: 90 TABLET | Refills: 1 | Status: SHIPPED | OUTPATIENT
Start: 2024-03-11

## 2024-09-03 RX ORDER — AMLODIPINE BESYLATE 10 MG/1
10 TABLET ORAL DAILY
Qty: 90 TABLET | Refills: 0 | Status: SHIPPED | OUTPATIENT
Start: 2024-09-03

## 2024-09-03 RX ORDER — HYDROCHLOROTHIAZIDE 12.5 MG/1
12.5 TABLET ORAL DAILY
Qty: 90 TABLET | Refills: 0 | Status: SHIPPED | OUTPATIENT
Start: 2024-09-03

## 2024-11-15 ENCOUNTER — OFFICE VISIT (OUTPATIENT)
Age: 24
End: 2024-11-15

## 2024-11-15 VITALS
WEIGHT: 293 LBS | SYSTOLIC BLOOD PRESSURE: 138 MMHG | BODY MASS INDEX: 43.4 KG/M2 | TEMPERATURE: 98.4 F | HEART RATE: 84 BPM | HEIGHT: 69 IN | OXYGEN SATURATION: 99 % | DIASTOLIC BLOOD PRESSURE: 82 MMHG

## 2024-11-15 DIAGNOSIS — Z00.00 ENCOUNTER FOR WELL ADULT EXAM WITHOUT ABNORMAL FINDINGS: ICD-10-CM

## 2024-11-15 LAB
ALBUMIN SERPL-MCNC: 3.5 G/DL (ref 3.5–5)
ALBUMIN/GLOB SERPL: 0.9 (ref 1.1–2.2)
ALP SERPL-CCNC: 59 U/L (ref 45–117)
ALT SERPL-CCNC: 20 U/L (ref 12–78)
ANION GAP SERPL CALC-SCNC: 7 MMOL/L (ref 2–12)
AST SERPL-CCNC: 11 U/L (ref 15–37)
BASOPHILS # BLD: 0 K/UL (ref 0–0.1)
BASOPHILS NFR BLD: 1 % (ref 0–1)
BILIRUB SERPL-MCNC: 1 MG/DL (ref 0.2–1)
BUN SERPL-MCNC: 13 MG/DL (ref 6–20)
BUN/CREAT SERPL: 16 (ref 12–20)
CALCIUM SERPL-MCNC: 8.7 MG/DL (ref 8.5–10.1)
CHLORIDE SERPL-SCNC: 106 MMOL/L (ref 97–108)
CHOLEST SERPL-MCNC: 143 MG/DL
CO2 SERPL-SCNC: 25 MMOL/L (ref 21–32)
CREAT SERPL-MCNC: 0.83 MG/DL (ref 0.55–1.02)
DIFFERENTIAL METHOD BLD: ABNORMAL
EOSINOPHIL # BLD: 0.1 K/UL (ref 0–0.4)
EOSINOPHIL NFR BLD: 3 % (ref 0–7)
ERYTHROCYTE [DISTWIDTH] IN BLOOD BY AUTOMATED COUNT: 13.6 % (ref 11.5–14.5)
EST. AVERAGE GLUCOSE BLD GHB EST-MCNC: 108 MG/DL
GLOBULIN SER CALC-MCNC: 3.8 G/DL (ref 2–4)
GLUCOSE SERPL-MCNC: 97 MG/DL (ref 65–100)
HBA1C MFR BLD: 5.4 % (ref 4–5.6)
HCT VFR BLD AUTO: 35.4 % (ref 35–47)
HDLC SERPL-MCNC: 49 MG/DL
HDLC SERPL: 2.9 (ref 0–5)
HGB BLD-MCNC: 11.5 G/DL (ref 11.5–16)
IMM GRANULOCYTES # BLD AUTO: 0 K/UL (ref 0–0.04)
IMM GRANULOCYTES NFR BLD AUTO: 0 % (ref 0–0.5)
LDLC SERPL CALC-MCNC: 74 MG/DL (ref 0–100)
LYMPHOCYTES # BLD: 1.3 K/UL (ref 0.8–3.5)
LYMPHOCYTES NFR BLD: 24 % (ref 12–49)
MCH RBC QN AUTO: 25.7 PG (ref 26–34)
MCHC RBC AUTO-ENTMCNC: 32.5 G/DL (ref 30–36.5)
MCV RBC AUTO: 79.2 FL (ref 80–99)
MONOCYTES # BLD: 0.4 K/UL (ref 0–1)
MONOCYTES NFR BLD: 8 % (ref 5–13)
NEUTS SEG # BLD: 3.4 K/UL (ref 1.8–8)
NEUTS SEG NFR BLD: 64 % (ref 32–75)
NRBC # BLD: 0 K/UL (ref 0–0.01)
NRBC BLD-RTO: 0 PER 100 WBC
PLATELET # BLD AUTO: 325 K/UL (ref 150–400)
PMV BLD AUTO: 11.7 FL (ref 8.9–12.9)
POTASSIUM SERPL-SCNC: 3.8 MMOL/L (ref 3.5–5.1)
PROT SERPL-MCNC: 7.3 G/DL (ref 6.4–8.2)
RBC # BLD AUTO: 4.47 M/UL (ref 3.8–5.2)
SODIUM SERPL-SCNC: 138 MMOL/L (ref 136–145)
T4 FREE SERPL-MCNC: 1.3 NG/DL (ref 0.8–1.5)
TRIGL SERPL-MCNC: 100 MG/DL
TSH SERPL DL<=0.05 MIU/L-ACNC: 2.09 UIU/ML (ref 0.36–3.74)
VLDLC SERPL CALC-MCNC: 20 MG/DL
WBC # BLD AUTO: 5.4 K/UL (ref 3.6–11)

## 2024-11-15 RX ORDER — HYDROCHLOROTHIAZIDE 12.5 MG/1
12.5 TABLET ORAL DAILY
Qty: 90 TABLET | Refills: 0 | Status: SHIPPED | OUTPATIENT
Start: 2024-11-15

## 2024-11-15 RX ORDER — AMLODIPINE BESYLATE 10 MG/1
10 TABLET ORAL DAILY
Qty: 90 TABLET | Refills: 0 | Status: SHIPPED | OUTPATIENT
Start: 2024-11-15

## 2024-11-15 NOTE — PROGRESS NOTES
Marybeth Mason is a 24 y.o. female , id x 2(name and ). Reviewed record, history, and  medications.    Chief Complaint   Patient presents with    Annual Exam    Lab Collection       Vitals:    11/15/24 0843   BP: 138/82   Pulse: 84   Temp: 98.4 °F (36.9 °C)   SpO2: 99%   Weight: 135.8 kg (299 lb 4.8 oz)   Height: 1.753 m (5' 9\")       Fasting    \"Have you been to the ER, urgent care clinic since your last visit?  Hospitalized since your last visit?\"    NO    “Have you seen or consulted any other health care providers outside of [facility] since your last visit?”    NO     “Have you had a pap smear?”    YES - Where: [facility] Nurse/CMA to request most recent records if not in the chart    No cervical cancer screening on file         10/2/2021     1:51 PM   Amb Fall Risk Assessment and TUG Test   Total Score 0         3/22/2023     2:32 PM   PHQ-9    Little interest or pleasure in doing things 0   Feeling down, depressed, or hopeless 0   PHQ-2 Score 0   PHQ-9 Total Score 0          No data to display              Social Determinants of Health     Tobacco Use: Low Risk  (11/15/2024)    Patient History     Smoking Tobacco Use: Never     Smokeless Tobacco Use: Never     Passive Exposure: Not on file   Alcohol Use: Not At Risk (10/12/2023)    AUDIT-C     Frequency of Alcohol Consumption: Monthly or less     Average Number of Drinks: 1 or 2     Frequency of Binge Drinking: Less than monthly   Financial Resource Strain: Not on file   Food Insecurity: Not on file (3/4/2024)   Transportation Needs: Not on file   Physical Activity: Not on file   Stress: Not on file   Social Connections: Not on file   Intimate Partner Violence: Not on file   Depression: Not at risk (3/22/2023)    PHQ-2     PHQ-2 Score: 0   Housing Stability: Not on file   Interpersonal Safety: Not on file   Utilities: Not on file       Click Here for Release of Records Request

## 2024-11-15 NOTE — PROGRESS NOTES
Well Adult Note  Name: Marybeth Mason Today’s Date: 11/15/2024   MRN: [id number] Sex: Female   Age: 24 y.o. Ethnicity: Non- / Non    : 2000 Race: Black /       Marybeth Mason is here for a well adult exam.       Assessment & Plan   Body mass index (BMI) of 40.0-44.9 in adult  -     AK Behavior  obesity (8-15 min) []  Encounter for well adult exam without abnormal findings  -     AK Behavior  obesity (8-15 min) []  -     Comprehensive Metabolic Panel; Future  -     CBC with Auto Differential; Future  -     Hemoglobin A1C; Future  -     TSH + Free T4 Panel; Future  -     Lipid Panel; Future      Return in 6 months (on 5/15/2025).       Subjective   History:  Pt presents for PE    Review of Systems    No Known Allergies  Prior to Visit Medications    Medication Sig Taking? Authorizing Provider   amLODIPine (NORVASC) 10 MG tablet Take 1 tablet by mouth daily Yes Emelyn Barboza APRN - [facility]   hydroCHLOROthiazide 12.5 MG tablet Take 1 tablet by mouth daily Yes Emelyn Barboza, APRN - CNP     Past Medical History:   Diagnosis Date    Hypertension     MS (multiple sclerosis) (HCC) 02/10/2021     History reviewed. No pertinent surgical history.  Family History   Problem Relation Age of Onset    Hypertension Mother     Hypertension Father     Elevated Lipids Father     Cancer Paternal Grandmother     Diabetes Maternal Grandmother      Social History     Tobacco Use    Smoking status: Never    Smokeless tobacco: Never    Tobacco comments:     Dont smoke   Vaping Use    Vaping status: Never Used   Substance Use Topics    Alcohol use: No    Drug use: No           Objective    Vital Signs  /82   Pulse 84   Temp 98.4 °F (36.9 °C)   Ht 1.753 m (5' 9\")   Wt 135.8 kg (299 lb 4.8 oz)   LMP 10/14/2024 (Approximate)   SpO2 99%   BMI 44.20 kg/m²     Wt Readings from Last 3 Encounters:   11/15/24 135.8 kg (299 lb 4.8 oz)   24 135.4 kg (298 lb 9.6 oz)   23

## 2024-11-19 ENCOUNTER — CLINICAL DOCUMENTATION (OUTPATIENT)
Facility: CLINIC | Age: 24
End: 2024-11-19

## 2025-08-12 ENCOUNTER — TELEPHONE (OUTPATIENT)
Facility: CLINIC | Age: 25
End: 2025-08-12

## 2025-08-12 RX ORDER — AMLODIPINE BESYLATE 10 MG/1
10 TABLET ORAL DAILY
Qty: 90 TABLET | Refills: 0 | OUTPATIENT
Start: 2025-08-12

## 2025-08-12 RX ORDER — HYDROCHLOROTHIAZIDE 12.5 MG/1
12.5 TABLET ORAL DAILY
Qty: 90 TABLET | Refills: 0 | OUTPATIENT
Start: 2025-08-12

## 2025-08-12 RX ORDER — AMLODIPINE BESYLATE 10 MG/1
10 TABLET ORAL DAILY
Qty: 90 TABLET | Refills: 0 | Status: SHIPPED | OUTPATIENT
Start: 2025-08-12

## 2025-08-12 RX ORDER — HYDROCHLOROTHIAZIDE 12.5 MG/1
12.5 TABLET ORAL DAILY
Qty: 90 TABLET | Refills: 0 | Status: SHIPPED | OUTPATIENT
Start: 2025-08-12